# Patient Record
Sex: MALE | Race: WHITE | NOT HISPANIC OR LATINO | Employment: OTHER | URBAN - METROPOLITAN AREA
[De-identification: names, ages, dates, MRNs, and addresses within clinical notes are randomized per-mention and may not be internally consistent; named-entity substitution may affect disease eponyms.]

---

## 2017-01-20 ENCOUNTER — OFFICE VISIT (OUTPATIENT)
Dept: MEDICAL GROUP | Age: 72
End: 2017-01-20
Payer: MEDICARE

## 2017-01-20 VITALS
WEIGHT: 264 LBS | DIASTOLIC BLOOD PRESSURE: 76 MMHG | OXYGEN SATURATION: 92 % | TEMPERATURE: 98.4 F | SYSTOLIC BLOOD PRESSURE: 132 MMHG | HEIGHT: 69 IN | HEART RATE: 75 BPM | BODY MASS INDEX: 39.1 KG/M2

## 2017-01-20 DIAGNOSIS — E66.9 OBESITY (BMI 35.0-39.9 WITHOUT COMORBIDITY): ICD-10-CM

## 2017-01-20 DIAGNOSIS — Z23 NEED FOR PNEUMOCOCCAL VACCINATION: ICD-10-CM

## 2017-01-20 DIAGNOSIS — E78.5 HYPERLIPIDEMIA LDL GOAL <100: ICD-10-CM

## 2017-01-20 DIAGNOSIS — R21 RASH: ICD-10-CM

## 2017-01-20 DIAGNOSIS — E03.4 HYPOTHYROIDISM DUE TO ACQUIRED ATROPHY OF THYROID: ICD-10-CM

## 2017-01-20 PROCEDURE — 99204 OFFICE O/P NEW MOD 45 MIN: CPT | Mod: 25 | Performed by: INTERNAL MEDICINE

## 2017-01-20 PROCEDURE — 90670 PCV13 VACCINE IM: CPT | Performed by: INTERNAL MEDICINE

## 2017-01-20 PROCEDURE — G0009 ADMIN PNEUMOCOCCAL VACCINE: HCPCS | Performed by: INTERNAL MEDICINE

## 2017-01-20 RX ORDER — TRIAMCINOLONE ACETONIDE 1 MG/G
CREAM TOPICAL 2 TIMES DAILY
COMMUNITY
End: 2023-06-17

## 2017-01-20 RX ORDER — ATORVASTATIN CALCIUM 20 MG/1
20 TABLET, FILM COATED ORAL
Qty: 90 TAB | Refills: 3 | Status: SHIPPED | OUTPATIENT
Start: 2017-01-20 | End: 2018-04-26 | Stop reason: SDUPTHER

## 2017-01-20 RX ORDER — ATORVASTATIN CALCIUM 20 MG/1
20 TABLET, FILM COATED ORAL
Qty: 90 TAB | Refills: 3 | Status: SHIPPED | OUTPATIENT
Start: 2017-01-20 | End: 2017-01-20 | Stop reason: SDUPTHER

## 2017-01-20 RX ORDER — LEVOTHYROXINE SODIUM 0.1 MG/1
100 TABLET ORAL
Qty: 90 TAB | Refills: 3 | Status: SHIPPED | OUTPATIENT
Start: 2017-01-20 | End: 2017-08-15 | Stop reason: SDUPTHER

## 2017-01-20 RX ORDER — LEVOTHYROXINE SODIUM 0.1 MG/1
100 TABLET ORAL
Qty: 90 TAB | Refills: 3 | Status: SHIPPED | OUTPATIENT
Start: 2017-01-20 | End: 2017-01-20 | Stop reason: SDUPTHER

## 2017-01-20 ASSESSMENT — PATIENT HEALTH QUESTIONNAIRE - PHQ9: CLINICAL INTERPRETATION OF PHQ2 SCORE: 0

## 2017-01-20 NOTE — ASSESSMENT & PLAN NOTE
Patient reported that he restarted Lipitor in December 2016, as his cholesterol level increased again. He is taking Lipitor 20 mg daily and red yeast rice daily. He denies side effects from taking Lipitor and red yeast rice. He reported that he eats fruits and vegetables and tries to eat healthy.

## 2017-01-20 NOTE — ASSESSMENT & PLAN NOTE
Patient reported that he has difficulty to lose weight. He states that his appetite is very good. He tries to eat more healthy diet. He walks every day for exercise. He also swims in little pool 2-3 times a week. He has not tried to limit his calorie intake and has strict cardio exercise.

## 2017-01-20 NOTE — ASSESSMENT & PLAN NOTE
Patient reported a rash around his neck started 2 days ago after he shaved. He stated that he did not use any shaving foam. He denied pain or discharge from rash. He has not tried anything for his rash. He does not have similar rash on other parts of the body.

## 2017-01-20 NOTE — MR AVS SNAPSHOT
"Price Moon   2017 9:40 AM   Office Visit   MRN: [id number]    Department:  [address] [place] [address]   Dept Phone:  [phone]    Description:  Male : 1945   Provider:  Joann Wakefield M.D.           Reason for Visit     Establish Care N2Y hep c test/rash on neck x2days/weight gain      Allergies as of 2017     No Known Allergies      You were diagnosed with     Hyperlipidemia LDL goal <100   [154918]       Hypothyroidism due to acquired atrophy of thyroid   [4961473]       Obesity (BMI 35.0-39.9 without comorbidity) ([facility])   [855166]       Elevated cholesterol   [266744]       Rash   [592327]       Need for pneumococcal vaccination   [426873]         Vital Signs     Blood Pressure Pulse Temperature Height Weight Body Mass Index    132/76 mmHg 75 36.9 °C (98.4 °F) 1.753 m (5' 9\") 119.75 kg (264 lb) 38.97 kg/m2    Oxygen Saturation Smoking Status                92% Former Smoker          Basic Information     Date Of Birth Sex Race Ethnicity Preferred Language    1945 Male White Non- English      Your appointments     2017  9:00 AM   Established Patient with Joann Wakefield M.D.   [address] [address] [phone]           You will be receiving a confirmation call a few days before your appointment from our automated call confirmation system.              Problem List              ICD-10-CM Priority Class Noted - Resolved    Eye problem H57.9   2016 - Present    BPH (benign prostatic hyperplasia) N40.0   2016 - Present    Hyperlipidemia LDL goal <100 E78.5   2016 - Present    Preventative health care Z00.00   2016 - Present    Hypothyroidism due to acquired atrophy of thyroid E03.4   2016 - Present    Bradycardia with 51-60 beats per minute R00.1   10/7/2016 - Present    Obesity (BMI 35.0-39.9 without comorbidity) ([facility]) E66.9   2017 - Present    Rash R21   2017 - Present   "   Health Maintenance        Date Due Completion Dates    IMM PNEUMOCOCCAL 65+ (ADULT) LOW/MEDIUM RISK SERIES (2 of 2 - PCV13) 5/24/2013 5/24/2012    IMM DTaP/Tdap/Td Vaccine (1 - Tdap) 2/28/2023 (Originally 2/28/2013) 2/27/2013    COLONOSCOPY 7/30/2024 7/30/2014 (Prv Comp)    Override on 7/30/2014: Previously completed            Current Immunizations     13-VALENT PCV PREVNAR  Incomplete    Influenza Vaccine Adult HD 10/7/2016  9:04 AM    Influenza Vaccine Quad Inj (Pf) 11/10/2014    Pneumococcal polysaccharide vaccine (PPSV-23) 5/24/2012    SHINGLES VACCINE 8/8/2015    TD Vaccine 2/27/2013      Below and/or attached are the medications your provider expects you to take. Review all of your home medications and newly ordered medications with your provider and/or pharmacist. Follow medication instructions as directed by your provider and/or pharmacist. Please keep your medication list with you and share with your provider. Update the information when medications are discontinued, doses are changed, or new medications (including over-the-counter products) are added; and carry medication information at all times in the event of emergency situations     Allergies:  No Known Allergies          Medications  Valid as of: January 20, 2017 - 10:27 AM    Generic Name Brand Name Tablet Size Instructions for use    Atorvastatin Calcium (Tab) LIPITOR 20 MG Take 1 Tab by mouth every bedtime.        Bimatoprost (Solution) LUMIGAN 0.01 % Place 1 Drop in both eyes every bedtime.        Levothyroxine Sodium (Tab) SYNTHROID 100 MCG Take 1 Tab by mouth Every morning on an empty stomach.        Misc Natural Products   Take  by mouth.        Red Yeast Rice Extract   Take  by mouth.        Triamcinolone Acetonide (Cream) KENALOG 0.1 % Apply  to affected area(s) 2 times a day.        .                 Medicines prescribed today were sent to:     [facility]ARMACY #[id number] - [place], [place] - [address]    [address] [place] [address]     Phone: [phone] Fax: [phone]    Open 24 Hours?: No    [facility] [place] [place] - [address] AT PORTAL TO REGISTERED [facility]    [address] [facility] [address]    Phone: [phone] Fax: [phone]    Open 24 Hours?: No      Medication refill instructions:       If your prescription bottle indicates you have medication refills left, it is not necessary to call your provider’s office. Please contact your pharmacy and they will refill your medication.    If your prescription bottle indicates you do not have any refills left, you may request refills at any time through one of the following ways: The online [employer] system (except Urgent Care), by calling your provider’s office, or by asking your pharmacy to contact your provider’s office with a refill request. Medication refills are processed only during regular business hours and may not be available until the next business day. Your provider may request additional information or to have a follow-up visit with you prior to refilling your medication.   *Please Note: Medication refills are assigned a new Rx number when refilled electronically. Your pharmacy may indicate that no refills were authorized even though a new prescription for the same medication is available at the pharmacy. Please request the medicine by name with the pharmacy before contacting your provider for a refill.        Your To Do List     Future Labs/Procedures Complete By Expires    CBC WITH DIFFERENTIAL  As directed 1/21/2018    COMP METABOLIC PANEL  As directed 1/21/2018    FREE THYROXINE  As directed 1/21/2018    LIPID PROFILE  As directed 1/21/2018    TSH  As directed 1/21/2018         [employer] Access Code: Activation code not generated  Current [employer] Status: Active

## 2017-01-20 NOTE — ASSESSMENT & PLAN NOTE
He was treated with levothyroxine 150 µg for 2-3 months last year. He stopped taking medication by himself, but he did not feel any difference. His TSH level increased after stopping medication. Patient reported difficult to lose weight. Otherwise, he denied other symptoms of hypothyroidism. He agreed to restart levothyroxine with lower dose 100 mcg every morning.

## 2017-01-20 NOTE — PROGRESS NOTES
Patient called to resent lipitor and levothyroxine to [facility]. Prescriptions are resent.    Joann Wakefield M.D.

## 2017-01-20 NOTE — PROGRESS NOTES
Price Moon is a 71 y.o. male here to establish care and the evaluation and management of:      HPI:    Hyperlipidemia LDL goal <100  Patient reported that he restarted Lipitor in December 2016, as his cholesterol level increased again. He is taking Lipitor 20 mg daily and red yeast rice daily. He denies side effects from taking Lipitor and red yeast rice. He reported that he eats fruits and vegetables and tries to eat healthy.    Hypothyroidism due to acquired atrophy of thyroid  He was treated with levothyroxine 150 µg for 2-3 months last year. He stopped taking medication by himself, but he did not feel any difference. His TSH level increased after stopping medication. Patient reported difficult to lose weight. Otherwise, he denied other symptoms of hypothyroidism. He agreed to restart levothyroxine with lower dose 100 mcg every morning.    Obesity (BMI 35.0-39.9 without comorbidity) (HCC)  Patient reported that he has difficulty to lose weight. He states that his appetite is very good. He tries to eat more healthy diet. He walks every day for exercise. He also swims in little pool 2-3 times a week. He has not tried to limit his calorie intake and has strict cardio exercise.    Rash  Patient reported a rash around his neck started 2 days ago after he shaved. He stated that he did not use any shaving foam. He denied pain or discharge from rash. He has not tried anything for his rash. He does not have similar rash on other parts of the body.      Current medicines (including changes today)  Current Outpatient Prescriptions   Medication Sig Dispense Refill   • Red Yeast Rice Extract (RED YEAST RICE PO) Take  by mouth.     • Misc Natural Products (PROSTATE HEALTH PO) Take  by mouth.     • triamcinolone acetonide (KENALOG) 0.1 % Cream Apply  to affected area(s) 2 times a day.     • atorvastatin (LIPITOR) 20 MG Tab Take 1 Tab by mouth every bedtime. 90 Tab 3   • levothyroxine (SYNTHROID) 100 MCG Tab Take 1 Tab by mouth  "Every morning on an empty stomach. 90 Tab 3   • bimatoprost (LUMIGAN) 0.01 % Solution Place 1 Drop in both eyes every bedtime.       No current facility-administered medications for this visit.     He  has a past medical history of Thyroid disease; Hyperlipidemia; and BPH (benign prostatic hyperplasia).  He  has past surgical history that includes hernia repair and eye surgery.  Social History   Substance Use Topics   • Smoking status: Former Smoker     Types: Cigarettes     Quit date: 1985   • Smokeless tobacco: Never Used   • Alcohol Use: No     Social History     Social History Narrative     Family History   Problem Relation Age of Onset   • Hypertension Mother    • Other Father      ALS   • Hypertension Brother      Family Status   Relation Status Death Age   • Mother     • Father     • Brother Alive    • Son Alive    • Son Alive    • Son Alive    • Son Alive      Health Maintenance Topics with due status: Overdue       Topic Date Due    Annual Wellness Visit 1945    IMM PNEUMOCOCCAL 65+ (ADULT) LOW/MEDIUM RISK SERIES 2013         ROS    Gen.: Denied weight change, appetite change, fatigue.  ENT: Denied sinus tenderness, nasal congestion, runny nose, or sore throat  CVS: Denied chest pain, palpitations, legs swelling.  Respiratory: Denied cough, shortness of breath, wheezing.  GI: Denied abdominal pain, constipation or diarrhea.  Endocrine: Denied temperature intolerance, increased frequency of urination, polyphagia or polydipsia.  Musculoskeletal: Denied back pain or joint pain.    All other systems reviewed and are negative     Objective:     Blood pressure 132/76, pulse 75, temperature 36.9 °C (98.4 °F), height 1.753 m (5' 9\"), weight 119.75 kg (264 lb), SpO2 92 %. Body mass index is 38.97 kg/(m^2).  Physical Exam:    Constitutional: Well nourished and Well developed, Alert, no distress.  Skin: Warm, dry, good turgor, no rashes in visible areas.  Eye: Equal, round and " reactive, conjunctiva clear, lids normal.  ENMT: Lips without lesions, good dentition, oropharynx clear.  Neck: Trachea midline, no masses, no thyromegaly. No cervical or supraclavicular lymphadenopathy.  Respiratory: Unlabored respiratory effort, lungs clear to auscultation, no wheezes, no ronchi.  Cardiovascular: Normal S1, S2, no murmur, no edema.  Abdomen: Soft, non distended, non-tender, no masses, no hepatosplenomegaly. Bowel sound normal.  Extremities: No edema, no clubbing, no cyanosis.  Psych: Alert and oriented x3, normal affect and mood.      Assessment and Plan:   The following treatment plan was discussed       1. Hyperlipidemia LDL goal <100  - Restarted atorvastatin 20 mg daily last months. Reviewed the side effects of atorvastatin and red yeast rice and advised patient to monitor side effects from taking both including muscle ache or cramping.  - Advised to eat low fat, low carbohydrate and high fiber diet as well as do cardio physical exercise regularly.   - We will recheck lipid panel in 3 months.  - atorvastatin (LIPITOR) 20 MG Tab; Take 1 Tab by mouth every bedtime.  Dispense: 90 Tab; Refill: 3  - COMP METABOLIC PANEL; Future  - LIPID PROFILE; Future    2. Hypothyroidism due to acquired atrophy of thyroid  - Restarted levothyroxine 100 µg every morning. Reviewed the side effect of levothyroxine with patient.  - levothyroxine (SYNTHROID) 100 MCG Tab; Take 1 Tab by mouth Every morning on an empty stomach.  Dispense: 90 Tab; Refill: 3  - CBC WITH DIFFERENTIAL; Future  - TSH; Future  - FREE THYROXINE; Future    3. Rash  - Localized inflammation of the hair follicles.  - He has triamcinolone cream at home. Recommend to try triamcinolone cream and Neosporin cream for 2 weeks.    4. Obesity (BMI 35.0-39.9 without comorbidity) ([facility])  - Counseled at length for healthy diet and regular physical exercise to lose weight. Discussed to restrict calorie intake 1800-calorie per day and cardio exercise  regularly.  - Patient identified as having weight management issue.  Appropriate orders and counseling given.  - CBC WITH DIFFERENTIAL; Future  - COMP METABOLIC PANEL; Future    5. Need for pneumococcal vaccination  - Prevnar 13 vaccine was given today after reviewing risks and benefits as well as side effects of vaccine.  - PNEUMOCOCCAL CONJUGATE VACCINE 13-VALENT        Records requested.  Followup: Return in about 3 months (around 4/20/2017), or if symptoms worsen or fail to improve, for hypothyroid, hyperlipidemia, obesity, lab review. sooner should new symptoms or problems arise.      Please note that this dictation was created using voice recognition software. I have made every reasonable attempt to correct obvious errors, but I expect that there may have unintended errors in text, spelling, punctuation, or grammar that I did not discover.

## 2017-03-24 PROBLEM — Z85.820 PERSONAL HISTORY OF MALIGNANT MELANOMA OF SKIN: Status: ACTIVE | Noted: 2017-03-24

## 2017-04-08 ENCOUNTER — HOSPITAL ENCOUNTER (OUTPATIENT)
Dept: LAB | Facility: MEDICAL CENTER | Age: 72
End: 2017-04-08
Attending: INTERNAL MEDICINE
Payer: MEDICARE

## 2017-04-08 DIAGNOSIS — E78.5 HYPERLIPIDEMIA LDL GOAL <100: ICD-10-CM

## 2017-04-08 DIAGNOSIS — E66.9 OBESITY (BMI 35.0-39.9 WITHOUT COMORBIDITY): ICD-10-CM

## 2017-04-08 DIAGNOSIS — E03.4 HYPOTHYROIDISM DUE TO ACQUIRED ATROPHY OF THYROID: ICD-10-CM

## 2017-04-08 LAB
ALBUMIN SERPL BCP-MCNC: 4.1 G/DL (ref 3.2–4.9)
ALBUMIN/GLOB SERPL: 1.4 G/DL
ALP SERPL-CCNC: 82 U/L (ref 30–99)
ALT SERPL-CCNC: 31 U/L (ref 2–50)
ANION GAP SERPL CALC-SCNC: 5 MMOL/L (ref 0–11.9)
AST SERPL-CCNC: 25 U/L (ref 12–45)
BASOPHILS # BLD AUTO: 0.7 % (ref 0–1.8)
BASOPHILS # BLD: 0.03 K/UL (ref 0–0.12)
BILIRUB SERPL-MCNC: 0.9 MG/DL (ref 0.1–1.5)
BUN SERPL-MCNC: 21 MG/DL (ref 8–22)
CALCIUM SERPL-MCNC: 9.2 MG/DL (ref 8.5–10.5)
CHLORIDE SERPL-SCNC: 106 MMOL/L (ref 96–112)
CHOLEST SERPL-MCNC: 115 MG/DL (ref 100–199)
CO2 SERPL-SCNC: 29 MMOL/L (ref 20–33)
CREAT SERPL-MCNC: 1.04 MG/DL (ref 0.5–1.4)
EOSINOPHIL # BLD AUTO: 0.06 K/UL (ref 0–0.51)
EOSINOPHIL NFR BLD: 1.3 % (ref 0–6.9)
ERYTHROCYTE [DISTWIDTH] IN BLOOD BY AUTOMATED COUNT: 43.8 FL (ref 35.9–50)
GFR SERPL CREATININE-BSD FRML MDRD: >60 ML/MIN/1.73 M 2
GLOBULIN SER CALC-MCNC: 2.9 G/DL (ref 1.9–3.5)
GLUCOSE SERPL-MCNC: 100 MG/DL (ref 65–99)
HCT VFR BLD AUTO: 48.8 % (ref 42–52)
HDLC SERPL-MCNC: 40 MG/DL
HGB BLD-MCNC: 16 G/DL (ref 14–18)
IMM GRANULOCYTES # BLD AUTO: 0.01 K/UL (ref 0–0.11)
IMM GRANULOCYTES NFR BLD AUTO: 0.2 % (ref 0–0.9)
LDLC SERPL CALC-MCNC: 57 MG/DL
LYMPHOCYTES # BLD AUTO: 1.62 K/UL (ref 1–4.8)
LYMPHOCYTES NFR BLD: 35.3 % (ref 22–41)
MCH RBC QN AUTO: 29.7 PG (ref 27–33)
MCHC RBC AUTO-ENTMCNC: 32.8 G/DL (ref 33.7–35.3)
MCV RBC AUTO: 90.5 FL (ref 81.4–97.8)
MONOCYTES # BLD AUTO: 0.4 K/UL (ref 0–0.85)
MONOCYTES NFR BLD AUTO: 8.7 % (ref 0–13.4)
NEUTROPHILS # BLD AUTO: 2.47 K/UL (ref 1.82–7.42)
NEUTROPHILS NFR BLD: 53.8 % (ref 44–72)
NRBC # BLD AUTO: 0 K/UL
NRBC BLD AUTO-RTO: 0 /100 WBC
PLATELET # BLD AUTO: 176 K/UL (ref 164–446)
PMV BLD AUTO: 10.1 FL (ref 9–12.9)
POTASSIUM SERPL-SCNC: 4.6 MMOL/L (ref 3.6–5.5)
PROT SERPL-MCNC: 7 G/DL (ref 6–8.2)
RBC # BLD AUTO: 5.39 M/UL (ref 4.7–6.1)
SODIUM SERPL-SCNC: 140 MMOL/L (ref 135–145)
T4 FREE SERPL-MCNC: 0.82 NG/DL (ref 0.53–1.43)
TRIGL SERPL-MCNC: 92 MG/DL (ref 0–149)
TSH SERPL DL<=0.005 MIU/L-ACNC: 1.97 UIU/ML (ref 0.3–3.7)
WBC # BLD AUTO: 4.6 K/UL (ref 4.8–10.8)

## 2017-04-08 PROCEDURE — 85025 COMPLETE CBC W/AUTO DIFF WBC: CPT

## 2017-04-08 PROCEDURE — 84443 ASSAY THYROID STIM HORMONE: CPT

## 2017-04-08 PROCEDURE — 84439 ASSAY OF FREE THYROXINE: CPT

## 2017-04-08 PROCEDURE — 80053 COMPREHEN METABOLIC PANEL: CPT

## 2017-04-08 PROCEDURE — 36415 COLL VENOUS BLD VENIPUNCTURE: CPT

## 2017-04-08 PROCEDURE — 80061 LIPID PANEL: CPT

## 2017-04-09 NOTE — PROGRESS NOTES
Quick Note:    I will discuss the result in upcoming appointment.     Joann Wakefield MD    ______

## 2017-04-20 ENCOUNTER — OFFICE VISIT (OUTPATIENT)
Dept: MEDICAL GROUP | Age: 72
End: 2017-04-20
Payer: MEDICARE

## 2017-04-20 VITALS
OXYGEN SATURATION: 94 % | RESPIRATION RATE: 16 BRPM | SYSTOLIC BLOOD PRESSURE: 124 MMHG | BODY MASS INDEX: 37.22 KG/M2 | HEART RATE: 78 BPM | TEMPERATURE: 98.1 F | DIASTOLIC BLOOD PRESSURE: 78 MMHG | WEIGHT: 260 LBS | HEIGHT: 70 IN

## 2017-04-20 DIAGNOSIS — N40.1 BENIGN PROSTATIC HYPERPLASIA WITH LOWER URINARY TRACT SYMPTOMS, UNSPECIFIED MORPHOLOGY: ICD-10-CM

## 2017-04-20 DIAGNOSIS — E78.5 HYPERLIPIDEMIA LDL GOAL <100: ICD-10-CM

## 2017-04-20 DIAGNOSIS — E03.4 HYPOTHYROIDISM DUE TO ACQUIRED ATROPHY OF THYROID: ICD-10-CM

## 2017-04-20 DIAGNOSIS — M54.2 NECK PAIN, BILATERAL: ICD-10-CM

## 2017-04-20 DIAGNOSIS — E66.9 OBESITY (BMI 35.0-39.9 WITHOUT COMORBIDITY): ICD-10-CM

## 2017-04-20 PROCEDURE — 99214 OFFICE O/P EST MOD 30 MIN: CPT | Performed by: INTERNAL MEDICINE

## 2017-04-20 PROCEDURE — 3017F COLORECTAL CA SCREEN DOC REV: CPT | Performed by: INTERNAL MEDICINE

## 2017-04-20 PROCEDURE — 4040F PNEUMOC VAC/ADMIN/RCVD: CPT | Performed by: INTERNAL MEDICINE

## 2017-04-20 PROCEDURE — 1101F PT FALLS ASSESS-DOCD LE1/YR: CPT | Performed by: INTERNAL MEDICINE

## 2017-04-20 PROCEDURE — G8419 CALC BMI OUT NRM PARAM NOF/U: HCPCS | Performed by: INTERNAL MEDICINE

## 2017-04-20 PROCEDURE — G8432 DEP SCR NOT DOC, RNG: HCPCS | Performed by: INTERNAL MEDICINE

## 2017-04-20 PROCEDURE — 1036F TOBACCO NON-USER: CPT | Performed by: INTERNAL MEDICINE

## 2017-04-20 ASSESSMENT — PAIN SCALES - GENERAL: PAINLEVEL: 5=MODERATE PAIN

## 2017-04-20 NOTE — ASSESSMENT & PLAN NOTE
Patient reports that he has intermittent sharp pain on bilateral back of the neck, radiated to the both shoulders for a few months. Pain is triggered by movement of the neck or shoulders. Pain improved with resting. We have discussed treatment option and have CT C-spine to evaluate for spinal stenosis. Patient declined to do the imaging study currently. He is interested to try conservative treatment first.

## 2017-04-20 NOTE — ASSESSMENT & PLAN NOTE
Patient takes over-the-counter prostate health supplement. He tolerates over-the-counter supplement without side effect. He only needed to get up one time at night for urination . He still has weak urine flow but stated that his symptoms are able to manage with over-the-counter and he does not want to take prescription medication.

## 2017-04-20 NOTE — MR AVS SNAPSHOT
"Price Moon   2017 9:00 AM   Office Visit   MRN: [id number]    Department:  [address] [place] [address]   Dept Phone:  [phone]    Description:  Male : 1945   Provider:  Joann Wakefield M.D.           Reason for Visit     Hyperlipidemia f/v lab review      Allergies as of 2017     No Known Allergies      You were diagnosed with     Hyperlipidemia LDL goal <100   [950454]       Hypothyroidism due to acquired atrophy of thyroid   [5867356]       Benign prostatic hyperplasia with lower urinary tract symptoms, unspecified morphology   [2428964]       Obesity (BMI 35.0-39.9 without comorbidity) (HCC)   [344438]       Neck pain, bilateral   [198192]         Vital Signs     Blood Pressure Pulse Temperature Respirations Height Weight    124/78 mmHg 78 36.7 °C (98.1 °F) 16 1.778 m (5' 10\") 117.935 kg (260 lb)    Body Mass Index Oxygen Saturation Smoking Status             37.31 kg/m2 94% Former Smoker         Basic Information     Date Of Birth Sex Race Ethnicity Preferred Language    1945 Male White Non- English      Your appointments     Oct 26, 2017  8:00 AM   ANNUAL EXAM PREVENTATIVE with Joann Wakefield M.D.   [address] [address] [phone]              Problem List              ICD-10-CM Priority Class Noted - Resolved    Eye problem H57.9   2016 - Present    BPH (benign prostatic hyperplasia) N40.0   2016 - Present    Hyperlipidemia LDL goal <100 E78.5   2016 - Present    Preventative health care Z00.00   2016 - Present    Hypothyroidism due to acquired atrophy of thyroid E03.4   2016 - Present    Bradycardia with 51-60 beats per minute R00.1   10/7/2016 - Present    Obesity (BMI 35.0-39.9 without comorbidity) (HCC) E66.9   2017 - Present    Rash R21   2017 - Present    Neck pain, bilateral M54.2   2017 - Present      Health Maintenance        Date Due Completion Dates    IMM " DTaP/Tdap/Td Vaccine (1 - Tdap) 2/28/2023 (Originally 2/28/2013) 2/27/2013    COLONOSCOPY 7/30/2024 7/30/2014 (Prv Comp)    Override on 7/30/2014: Previously completed            Current Immunizations     13-VALENT PCV PREVNAR 1/20/2017    Influenza Vaccine Adult HD 10/7/2016  9:04 AM    Influenza Vaccine Quad Inj (Pf) 11/10/2014    Pneumococcal polysaccharide vaccine (PPSV-23) 5/24/2012    SHINGLES VACCINE 8/8/2015    TD Vaccine 2/27/2013      Below and/or attached are the medications your provider expects you to take. Review all of your home medications and newly ordered medications with your provider and/or pharmacist. Follow medication instructions as directed by your provider and/or pharmacist. Please keep your medication list with you and share with your provider. Update the information when medications are discontinued, doses are changed, or new medications (including over-the-counter products) are added; and carry medication information at all times in the event of emergency situations     Allergies:  No Known Allergies          Medications  Valid as of: April 20, 2017 -  9:41 AM    Generic Name Brand Name Tablet Size Instructions for use    Atorvastatin Calcium (Tab) LIPITOR 20 MG Take 1 Tab by mouth every bedtime.        Bimatoprost (Solution) LUMIGAN 0.01 % Place 1 Drop in both eyes every bedtime.        Levothyroxine Sodium (Tab) SYNTHROID 100 MCG Take 1 Tab by mouth Every morning on an empty stomach.        Misc Natural Products   Take  by mouth.        Red Yeast Rice Extract   Take  by mouth.        Triamcinolone Acetonide (Cream) KENALOG 0.1 % Apply  to affected area(s) 2 times a day.        .                 Medicines prescribed today were sent to:     [facility]/PHARMACY #[id number] - [place] - [address]    [address] [place] [address]    Phone: [phone] Fax: [phone]    Open 24 Hours?: No    [facility] - [place], [place] - [address] AT PORTAL TO REGISTERED  [facility]    [address] [facility] [address]    Phone: [phone] Fax: [phone]    Open 24 Hours?: No      Medication refill instructions:       If your prescription bottle indicates you have medication refills left, it is not necessary to call your provider’s office. Please contact your pharmacy and they will refill your medication.    If your prescription bottle indicates you do not have any refills left, you may request refills at any time through one of the following ways: The online [employer] system (except Urgent Care), by calling your provider’s office, or by asking your pharmacy to contact your provider’s office with a refill request. Medication refills are processed only during regular business hours and may not be available until the next business day. Your provider may request additional information or to have a follow-up visit with you prior to refilling your medication.   *Please Note: Medication refills are assigned a new Rx number when refilled electronically. Your pharmacy may indicate that no refills were authorized even though a new prescription for the same medication is available at the pharmacy. Please request the medicine by name with the pharmacy before contacting your provider for a refill.        Your To Do List     Future Labs/Procedures Complete By Expires    COMP METABOLIC PANEL  As directed 4/21/2018    FREE THYROXINE  As directed 4/21/2018    LIPID PROFILE  As directed 4/21/2018    TSH  As directed 4/21/2018         [employer] Access Code: Activation code not generated  Current [employer] Status: Active

## 2017-04-20 NOTE — ASSESSMENT & PLAN NOTE
He is taking levothyroxine 100 µg every morning and stated that he takes it with empty stomach. Levothyroxine was restarted on 1/20/17. His thyroid function improved and stable. He denied signs and symptoms of hypo-or hyperthyroidism.    Results for DEWAYNE VILLEGAS (MRN [id number]) as of 4/20/2017 10:13   Ref. Range 4/8/2017 08:33   TSH Latest Ref Range: 0.300-3.700 uIU/mL 1.970   Free T-4 Latest Ref Range: 0.53-1.43 ng/dL 0.82

## 2017-04-20 NOTE — PROGRESS NOTES
Subjective:   Dewayne Moon is a 71 y.o. male here today for evaluation and management of:      Hyperlipidemia LDL goal <100  Patient is taking Lipitor 10 mg every evening, started in December 2016. He still taking red yeast rice from over-the-counter. He tolerates both medications. He denies side effects from taking his Lipitor and red yeast rice. His cholesterol level improved. Liver enzymes are within normal. He has slightly elevated fasting blood glucose at 100.    Results for DEWAYNE MOON (MRN [id number]) as of 4/20/2017 10:13   Ref. Range 4/8/2017 08:33   Cholesterol,Tot Latest Ref Range: 100-199 mg/dL 115   Triglycerides Latest Ref Range: 0-149 mg/dL 92   HDL Latest Ref Range: >=40 mg/dL 40   LDL Latest Ref Range: <100 mg/dL 57       Hypothyroidism due to acquired atrophy of thyroid  He is taking levothyroxine 100 µg every morning and stated that he takes it with empty stomach. Levothyroxine was restarted on 1/20/17. His thyroid function improved and stable. He denied signs and symptoms of hypo-or hyperthyroidism.    Results for DEWAYNE MOON (MRN [id number]) as of 4/20/2017 10:13   Ref. Range 4/8/2017 08:33   TSH Latest Ref Range: 0.300-3.700 uIU/mL 1.970   Free T-4 Latest Ref Range: 0.53-1.43 ng/dL 0.82       BPH (benign prostatic hyperplasia)  Patient takes over-the-counter prostate health supplement. He tolerates over-the-counter supplement without side effect. He only needed to get up one time at night for urination . He still has weak urine flow but stated that his symptoms are able to manage with over-the-counter and he does not want to take prescription medication.    Neck pain, bilateral  Patient reports that he has intermittent sharp pain on bilateral back of the neck, radiated to the both shoulders for a few months. Pain is triggered by movement of the neck or shoulders. Pain improved with resting. We have discussed treatment option and have CT C-spine to evaluate for spinal stenosis. Patient declined to do  "the imaging study currently. He is interested to try conservative treatment first.    Obesity (BMI 35.0-39.9 without comorbidity) (HCC)  Patient's weight has not been decreasing a lot. He lost 4 pounds compared to the weight measured in previous visit on 1/20/17. He reported that he has regular biking exercise for 45 minutes 4 times a week. He swims 3-4 times a week as well.         Current medicines (including changes today)  Current Outpatient Prescriptions   Medication Sig Dispense Refill   • Red Yeast Rice Extract (RED YEAST RICE PO) Take  by mouth.     • Misc Natural Products (PROSTATE HEALTH PO) Take  by mouth.     • triamcinolone acetonide (KENALOG) 0.1 % Cream Apply  to affected area(s) 2 times a day.     • atorvastatin (LIPITOR) 20 MG Tab Take 1 Tab by mouth every bedtime. 90 Tab 3   • levothyroxine (SYNTHROID) 100 MCG Tab Take 1 Tab by mouth Every morning on an empty stomach. 90 Tab 3   • bimatoprost (LUMIGAN) 0.01 % Solution Place 1 Drop in both eyes every bedtime.       No current facility-administered medications for this visit.     He  has a past medical history of Thyroid disease; Hyperlipidemia; and BPH (benign prostatic hyperplasia).    ROS   No chest pain, no shortness of breath, no abdominal pain       Objective:     Blood pressure 124/78, pulse 78, temperature 36.7 °C (98.1 °F), resp. rate 16, height 1.778 m (5' 10\"), weight 117.935 kg (260 lb), SpO2 94 %. Body mass index is 37.31 kg/(m^2).   Physical Exam:  General: Alert, oriented and no acute distress.  Eye contact is good, speech goal directed, affect calm  HEENT: conjunctiva non-injected, sclera non-icteric.  Oral mucous membranes pink and moist with no lesions.  Pinna normal.   Lungs: Normal respiratory effort, clear to auscultation bilaterally with good excursion.  CV: regular rate and rhythm. No murmurs.   Abdomen: soft, non distended, nontender, Bowel sound normal.  Ext: no edema, color normal, vascularity normal, temperature normal   "       Assessment and Plan:   The following treatment plan was discussed     1. Hyperlipidemia LDL goal <100  - Well-controlled. Continue current regimens. Recheck lab 1-2 weeks before next follow up visit.  - Advised to eat low fat, low carbohydrate and high fiber diet as well as do cardio physical exercise regularly.   - COMP METABOLIC PANEL; Future  - LIPID PROFILE; Future    2. Hypothyroidism due to acquired atrophy of thyroid  - Well-controlled. Continue current regimens. Recheck lab 1-2 weeks before next follow up visit.  - TSH; Future  - FREE THYROXINE; Future    3. Benign prostatic hyperplasia with lower urinary tract symptoms, unspecified morphology  - Well-controlled. Continue current regimens. Recheck lab 1-2 weeks before next follow up visit.  - Patient declined to take prescription medication.     4. Obesity (BMI 35.0-39.9 without comorbidity) ([facility])  - Counseled for healthy diet and regular physical exercise to lose weight.     5. Neck pain, bilateral  - Discussed and demonstrated how to do gentle stretching neck and shoulders exercises.   - Advised to do heat therapy.  - Discussed to do physical therapy if symptoms do not improve with above management. Patient wants to wait to refer to physical therapy. He also declined to do x-ray or CT neck.  - He may try over-the-counter Tylenol or low-dose ibuprofen as needed for pain. Discussed to use NSAIDs cautiously.  - Advised patient to stop taking NSAIDs if he has abdominal pain, black tarry stool or bloody stool.      Followup: Return in about 6 months (around 10/20/2017), or if symptoms worsen or fail to improve, for annual exam, lab follow-up.      Please note that this dictation was created using voice recognition software. I have made every reasonable attempt to correct obvious errors, but I expect that there may have unintended errors in text, spelling, punctuation, or grammar that I did not discover.

## 2017-04-20 NOTE — ASSESSMENT & PLAN NOTE
Patient's weight has not been decreasing a lot. He lost 4 pounds compared to the weight measured in previous visit on 1/20/17. He reported that he has regular biking exercise for 45 minutes 4 times a week. He swims 3-4 times a week as well.

## 2017-04-20 NOTE — ASSESSMENT & PLAN NOTE
Patient is taking Lipitor 10 mg every evening, started in December 2016. He still taking red yeast rice from over-the-counter. He tolerates both medications. He denies side effects from taking his Lipitor and red yeast rice. His cholesterol level improved. Liver enzymes are within normal. He has slightly elevated fasting blood glucose at 100.    Results for DEWAYNE VILLEGAS (MRN [id number]) as of 4/20/2017 10:13   Ref. Range 4/8/2017 08:33   Cholesterol,Tot Latest Ref Range: 100-199 mg/dL 115   Triglycerides Latest Ref Range: 0-149 mg/dL 92   HDL Latest Ref Range: >=40 mg/dL 40   LDL Latest Ref Range: <100 mg/dL 57

## 2017-05-17 ENCOUNTER — OFFICE VISIT (OUTPATIENT)
Dept: MEDICAL GROUP | Age: 72
End: 2017-05-17
Payer: MEDICARE

## 2017-05-17 VITALS
BODY MASS INDEX: 36.59 KG/M2 | DIASTOLIC BLOOD PRESSURE: 82 MMHG | WEIGHT: 255.6 LBS | SYSTOLIC BLOOD PRESSURE: 110 MMHG | OXYGEN SATURATION: 91 % | HEART RATE: 96 BPM | TEMPERATURE: 98.5 F | HEIGHT: 70 IN

## 2017-05-17 DIAGNOSIS — H69.93 EUSTACHIAN TUBE DYSFUNCTION, BILATERAL: ICD-10-CM

## 2017-05-17 DIAGNOSIS — R05.9 COUGH: ICD-10-CM

## 2017-05-17 DIAGNOSIS — J01.00 ACUTE NON-RECURRENT MAXILLARY SINUSITIS: ICD-10-CM

## 2017-05-17 PROCEDURE — 99214 OFFICE O/P EST MOD 30 MIN: CPT | Mod: 25 | Performed by: FAMILY MEDICINE

## 2017-05-17 PROCEDURE — 94640 AIRWAY INHALATION TREATMENT: CPT | Performed by: FAMILY MEDICINE

## 2017-05-17 PROCEDURE — 1101F PT FALLS ASSESS-DOCD LE1/YR: CPT | Performed by: FAMILY MEDICINE

## 2017-05-17 PROCEDURE — 4040F PNEUMOC VAC/ADMIN/RCVD: CPT | Performed by: FAMILY MEDICINE

## 2017-05-17 PROCEDURE — G8419 CALC BMI OUT NRM PARAM NOF/U: HCPCS | Performed by: FAMILY MEDICINE

## 2017-05-17 PROCEDURE — 1036F TOBACCO NON-USER: CPT | Performed by: FAMILY MEDICINE

## 2017-05-17 PROCEDURE — 3017F COLORECTAL CA SCREEN DOC REV: CPT | Performed by: FAMILY MEDICINE

## 2017-05-17 PROCEDURE — G8432 DEP SCR NOT DOC, RNG: HCPCS | Performed by: FAMILY MEDICINE

## 2017-05-17 RX ORDER — BENZONATATE 100 MG/1
100 CAPSULE ORAL 3 TIMES DAILY PRN
Qty: 60 CAP | Refills: 0 | Status: SHIPPED | OUTPATIENT
Start: 2017-05-17 | End: 2017-06-14

## 2017-05-17 RX ORDER — ALBUTEROL SULFATE 90 UG/1
2 AEROSOL, METERED RESPIRATORY (INHALATION) EVERY 6 HOURS PRN
Qty: 8.5 G | Refills: 0 | Status: SHIPPED | OUTPATIENT
Start: 2017-05-17 | End: 2017-10-26

## 2017-05-17 RX ORDER — FLUTICASONE PROPIONATE 50 MCG
2 SPRAY, SUSPENSION (ML) NASAL DAILY
Qty: 16 G | Refills: 3 | Status: SHIPPED | OUTPATIENT
Start: 2017-05-17 | End: 2018-10-22

## 2017-05-17 RX ORDER — IPRATROPIUM BROMIDE AND ALBUTEROL SULFATE 2.5; .5 MG/3ML; MG/3ML
3 SOLUTION RESPIRATORY (INHALATION) ONCE
Status: COMPLETED | OUTPATIENT
Start: 2017-05-17 | End: 2017-05-17

## 2017-05-17 RX ORDER — AMOXICILLIN AND CLAVULANATE POTASSIUM 875; 125 MG/1; MG/1
1 TABLET, FILM COATED ORAL 2 TIMES DAILY
Qty: 20 TAB | Refills: 0 | Status: SHIPPED | OUTPATIENT
Start: 2017-05-17 | End: 2017-06-14

## 2017-05-17 RX ORDER — CODEINE PHOSPHATE AND GUAIFENESIN 10; 100 MG/5ML; MG/5ML
5 SOLUTION ORAL EVERY 6 HOURS PRN
Qty: 150 ML | Refills: 0 | Status: SHIPPED | OUTPATIENT
Start: 2017-05-17 | End: 2017-10-26

## 2017-05-17 RX ADMIN — IPRATROPIUM BROMIDE AND ALBUTEROL SULFATE 3 ML: 2.5; .5 SOLUTION RESPIRATORY (INHALATION) at 16:26

## 2017-05-17 NOTE — MR AVS SNAPSHOT
"        Pricebenigno Mesabenjamindee   2017 2:00 PM   Office Visit   MRN: [id number]    Department:  [address] [place] [address]   Dept Phone:  [phone]    Description:  Male : 1945   Provider:  Jaquelin Mesa M.D.           Reason for Visit     Nasal Congestion Having a lot more mucus then usual. x 1 week    Otalgia Pressure, can not hear anything      Allergies as of 2017     No Known Allergies      You were diagnosed with     Acute non-recurrent maxillary sinusitis   [7256519]       Eustachian tube dysfunction, bilateral   [8260414]       Cough   [786.2.ICD-9-CM]         Vital Signs     Blood Pressure Pulse Temperature Height Weight Body Mass Index    110/82 mmHg 96 36.9 °C (98.5 °F) 1.778 m (5' 10\") 115.939 kg (255 lb 9.6 oz) 36.67 kg/m2    Oxygen Saturation Smoking Status                91% Former Smoker          Basic Information     Date Of Birth Sex Race Ethnicity Preferred Language    1945 Male White Non- English      Your appointments     May 24, 2017  3:40 PM   Established Patient with Jaquelin Mesa M.D.   [address])    [address] [place] [address] [phone]           You will be receiving a confirmation call a few days before your appointment from our automated call confirmation system.            Oct 26, 2017  8:00 AM   ANNUAL EXAM PREVENTATIVE with Joann Wakefield M.D.   [address] [address] [phone]              Problem List              ICD-10-CM Priority Class Noted - Resolved    Eye problem H57.9   2016 - Present    BPH (benign prostatic hyperplasia) N40.0   2016 - Present    Hyperlipidemia LDL goal <100 E78.5   2016 - Present    Preventative health care Z00.00   2016 - Present    Hypothyroidism due to acquired atrophy of thyroid E03.4   2016 - Present    Bradycardia with 51-60 beats per minute R00.1   10/7/2016 - Present    Obesity (BMI 35.0-39.9 " without comorbidity) ([facility]) E66.9   1/20/2017 - Present    Rash R21   1/20/2017 - Present    Neck pain, bilateral M54.2   4/20/2017 - Present      Health Maintenance        Date Due Completion Dates    IMM DTaP/Tdap/Td Vaccine (1 - Tdap) 2/28/2023 (Originally 2/28/2013) 2/27/2013    COLONOSCOPY 7/30/2024 7/30/2014 (Prv Comp)    Override on 7/30/2014: Previously completed            Current Immunizations     13-VALENT PCV PREVNAR 1/20/2017    Influenza Vaccine Adult HD 10/7/2016  9:04 AM    Influenza Vaccine Quad Inj (Pf) 11/10/2014    Pneumococcal polysaccharide vaccine (PPSV-23) 5/24/2012    SHINGLES VACCINE 8/8/2015    TD Vaccine 2/27/2013      Below and/or attached are the medications your provider expects you to take. Review all of your home medications and newly ordered medications with your provider and/or pharmacist. Follow medication instructions as directed by your provider and/or pharmacist. Please keep your medication list with you and share with your provider. Update the information when medications are discontinued, doses are changed, or new medications (including over-the-counter products) are added; and carry medication information at all times in the event of emergency situations     Allergies:  No Known Allergies          Medications  Valid as of: May 17, 2017 -  3:19 PM    Generic Name Brand Name Tablet Size Instructions for use    Albuterol Sulfate (Aero Soln) albuterol 108 (90 BASE) MCG/ACT Inhale 2 Puffs by mouth every 6 hours as needed for Shortness of Breath.        Amoxicillin-Pot Clavulanate (Tab) AUGMENTIN 875-125 MG Take 1 Tab by mouth 2 times a day.        Atorvastatin Calcium (Tab) LIPITOR 20 MG Take 1 Tab by mouth every bedtime.        Benzonatate (Cap) TESSALON 100 MG Take 1 Cap by mouth 3 times a day as needed for Cough.        Bimatoprost (Solution) LUMIGAN 0.01 % Place 1 Drop in both eyes every bedtime.        Fluticasone Propionate (Suspension) FLONASE 50 MCG/ACT Spray 2 Sprays in  nose every day. Each Nostril        Guaifenesin-Codeine (Solution) ROBITUSSIN -10 mg/5mL Take 5 mL by mouth every 6 hours as needed for Cough.        Levothyroxine Sodium (Tab) SYNTHROID 100 MCG Take 1 Tab by mouth Every morning on an empty stomach.        Misc Natural Products   Take  by mouth.        Red Yeast Rice Extract   Take  by mouth.        Triamcinolone Acetonide (Cream) KENALOG 0.1 % Apply  to affected area(s) 2 times a day.        .                 Medicines prescribed today were sent to:     [facility]ARMACY #[id number] - [place], [place] - [address]    [address] [place] [address]    Phone: [phone] Fax: [phone]    Open 24 Hours?: No    [facility] - [place], [place] - [address] AT PORTAL TO REGISTERED [facility]    [address] [facility] [address]    Phone: [phone] Fax: [phone]    Open 24 Hours?: No      Medication refill instructions:       If your prescription bottle indicates you have medication refills left, it is not necessary to call your provider’s office. Please contact your pharmacy and they will refill your medication.    If your prescription bottle indicates you do not have any refills left, you may request refills at any time through one of the following ways: The online [employer] system (except Urgent Care), by calling your provider’s office, or by asking your pharmacy to contact your provider’s office with a refill request. Medication refills are processed only during regular business hours and may not be available until the next business day. Your provider may request additional information or to have a follow-up visit with you prior to refilling your medication.   *Please Note: Medication refills are assigned a new Rx number when refilled electronically. Your pharmacy may indicate that no refills were authorized even though a new prescription for the same medication is available at the pharmacy. Please request the medicine by  name with the pharmacy before contacting your provider for a refill.           MyChart Access Code: Activation code not generated  Current MyChart Status: Active

## 2017-05-17 NOTE — PROGRESS NOTES
"SUBJECTIVE:        Chief Complaint   Patient presents with   • Nasal Congestion     Having a lot more mucus then usual. x 1 week   • Otalgia     Pressure, can not hear anything       HPI:     Price Moon is a 71 y.o. male here for symptoms of cough, nasal congestion and ear pressure on both sides. Symptoms started about a week ago, but has not been having any improvements in symptoms. Wife has similar symptoms. Recently was in [place] visiting his son.  Has had a lot of mucous form his nose. Coughing up mucous as well. Ear feel congested on both sides. Has been using some OTC prodcuts without improvement. Has used nyquil but does not feel it helps. Denies any wheezing or significant shortness of breath. Used Nyquil.   Nonsmoker currently, hx smoking some.       ROS:  Denies current fevers or chills. No nausea or vomiting. No diarrhea. No chest pains or shortness of breath. No lower extremity edema.    Current Outpatient Prescriptions on File Prior to Visit   Medication Sig Dispense Refill   • Red Yeast Rice Extract (RED YEAST RICE PO) Take  by mouth.     • Misc Natural Products (PROSTATE HEALTH PO) Take  by mouth.     • atorvastatin (LIPITOR) 20 MG Tab Take 1 Tab by mouth every bedtime. 90 Tab 3   • levothyroxine (SYNTHROID) 100 MCG Tab Take 1 Tab by mouth Every morning on an empty stomach. 90 Tab 3   • bimatoprost (LUMIGAN) 0.01 % Solution Place 1 Drop in both eyes every bedtime.     • triamcinolone acetonide (KENALOG) 0.1 % Cream Apply  to affected area(s) 2 times a day.       No current facility-administered medications on file prior to visit.       No Known Allergies    Past Medical History   Diagnosis Date   • Thyroid disease    • Hyperlipidemia    • BPH (benign prostatic hyperplasia)          OBJECTIVE:   /82 mmHg  Pulse 96  Temp(Src) 36.9 °C (98.5 °F)  Ht 1.778 m (5' 10\")  Wt 115.939 kg (255 lb 9.6 oz)  BMI 36.67 kg/m2  SpO2 91%   General: Well-developed well-nourished male, no acute " distress  HEENT: oropharynx clear, eyes clear, TMs clear and intact  Neck: supple, no lymphadenopathy- cervical or supraclavicular, no thyromegaly  Cardiovascular: regular rate and rhythm, no murmurs, gallops, rubs  Lungs: minimal wheezes throughout. No crackles, or rhonchi  Abdomen: +bowel sounds, soft, nontender, nondistended, no rebound, no guarding, no hepatosplenomegaly  Extremities: no cyanosis, clubbing, edema  Skin: Warm and dry  Psych: appropriate mood and affect    ASSESSMENT/PLAN:    71 year old male.     1. Acute non-recurrent maxillary sinusitis  amoxicillin-clavulanate (AUGMENTIN) 875-125 MG Tab   2. Eustachian tube dysfunction, bilateral  fluticasone (FLONASE) 50 MCG/ACT nasal spray   3. Cough- minimal wheezes, improved with nebulizer treatment, oxygenation also improve. Appear slight reactive airway component. Monitor.   guaifenesin-codeine (ROBITUSSIN AC) Solution oral solution    albuterol 108 (90 BASE) MCG/ACT Aero Soln inhalation aerosol    ipratropium-albuterol (DUONEB) nebulizer solution 3 mL       Return in about 1 week (around 5/24/2017).    This medical record contains text that has been entered with the assistance of computer voice recognition and dictation software.  Therefore, it may contain unintended errors in text, spelling, punctuation, or grammar.

## 2017-05-24 ENCOUNTER — TELEPHONE (OUTPATIENT)
Dept: MEDICAL GROUP | Age: 72
End: 2017-05-24

## 2017-05-24 ENCOUNTER — OFFICE VISIT (OUTPATIENT)
Dept: MEDICAL GROUP | Age: 72
End: 2017-05-24
Payer: MEDICARE

## 2017-05-24 VITALS
HEIGHT: 70 IN | DIASTOLIC BLOOD PRESSURE: 70 MMHG | TEMPERATURE: 98 F | WEIGHT: 255.4 LBS | BODY MASS INDEX: 36.56 KG/M2 | OXYGEN SATURATION: 93 % | SYSTOLIC BLOOD PRESSURE: 122 MMHG | HEART RATE: 95 BPM

## 2017-05-24 DIAGNOSIS — H69.93 EUSTACHIAN TUBE DYSFUNCTION, BILATERAL: ICD-10-CM

## 2017-05-24 DIAGNOSIS — J01.00 ACUTE NON-RECURRENT MAXILLARY SINUSITIS: ICD-10-CM

## 2017-05-24 DIAGNOSIS — R05.9 COUGH: ICD-10-CM

## 2017-05-24 PROCEDURE — G8419 CALC BMI OUT NRM PARAM NOF/U: HCPCS | Performed by: FAMILY MEDICINE

## 2017-05-24 PROCEDURE — 3017F COLORECTAL CA SCREEN DOC REV: CPT | Performed by: FAMILY MEDICINE

## 2017-05-24 PROCEDURE — 1036F TOBACCO NON-USER: CPT | Performed by: FAMILY MEDICINE

## 2017-05-24 PROCEDURE — 1101F PT FALLS ASSESS-DOCD LE1/YR: CPT | Performed by: FAMILY MEDICINE

## 2017-05-24 PROCEDURE — 4040F PNEUMOC VAC/ADMIN/RCVD: CPT | Performed by: FAMILY MEDICINE

## 2017-05-24 PROCEDURE — G8432 DEP SCR NOT DOC, RNG: HCPCS | Performed by: FAMILY MEDICINE

## 2017-05-24 PROCEDURE — 99213 OFFICE O/P EST LOW 20 MIN: CPT | Performed by: FAMILY MEDICINE

## 2017-05-24 NOTE — MR AVS SNAPSHOT
"Price Moon   2017 3:40 PM   Office Visit   MRN: [id number]    Department:  [address] [place] [address]   Dept Phone:  [phone]    Description:  Male : 1945   Provider:  Jaquelin Mesa M.D.           Reason for Visit     Cough           Allergies as of 2017     No Known Allergies      Vital Signs     Blood Pressure Pulse Temperature Height Weight Body Mass Index    122/70 mmHg 95 36.7 °C (98 °F) 1.778 m (5' 10\") 115.849 kg (255 lb 6.4 oz) 36.65 kg/m2    Oxygen Saturation Smoking Status                93% Former Smoker          Basic Information     Date Of Birth Sex Race Ethnicity Preferred Language    1945 Male White Non- English      Your appointments     Oct 26, 2017  8:00 AM   ANNUAL EXAM PREVENTATIVE with Joann Wakefield M.D.   [address] [facility] [id number] [phone]              Problem List              ICD-10-CM Priority Class Noted - Resolved    Eye problem H57.9   2016 - Present    BPH (benign prostatic hyperplasia) N40.0   2016 - Present    Hyperlipidemia LDL goal <100 E78.5   2016 - Present    Preventative health care Z00.00   2016 - Present    Hypothyroidism due to acquired atrophy of thyroid E03.4   2016 - Present    Bradycardia with 51-60 beats per minute R00.1   10/7/2016 - Present    Obesity (BMI 35.0-39.9 without comorbidity) (HCC) E66.9   2017 - Present    Rash R21   2017 - Present    Neck pain, bilateral M54.2   2017 - Present      Health Maintenance        Date Due Completion Dates    IMM DTaP/Tdap/Td Vaccine (1 - Tdap) 2023 (Originally 2013) 2013    COLONOSCOPY 2024 (Prv Comp)    Override on 2014: Previously completed            Current Immunizations     13-VALENT PCV PREVNAR 2017    Influenza Vaccine Adult HD 10/7/2016  9:04 AM    Influenza Vaccine Quad Inj (Pf) 11/10/2014    Pneumococcal polysaccharide vaccine (PPSV-23) " 5/24/2012    SHINGLES VACCINE 8/8/2015    TD Vaccine 2/27/2013      Below and/or attached are the medications your provider expects you to take. Review all of your home medications and newly ordered medications with your provider and/or pharmacist. Follow medication instructions as directed by your provider and/or pharmacist. Please keep your medication list with you and share with your provider. Update the information when medications are discontinued, doses are changed, or new medications (including over-the-counter products) are added; and carry medication information at all times in the event of emergency situations     Allergies:  No Known Allergies          Medications  Valid as of: May 24, 2017 -  4:49 PM    Generic Name Brand Name Tablet Size Instructions for use    Albuterol Sulfate (Aero Soln) albuterol 108 (90 BASE) MCG/ACT Inhale 2 Puffs by mouth every 6 hours as needed for Shortness of Breath.        Amoxicillin-Pot Clavulanate (Tab) AUGMENTIN 875-125 MG Take 1 Tab by mouth 2 times a day.        Atorvastatin Calcium (Tab) LIPITOR 20 MG Take 1 Tab by mouth every bedtime.        Benzonatate (Cap) TESSALON 100 MG Take 1 Cap by mouth 3 times a day as needed for Cough.        Bimatoprost (Solution) LUMIGAN 0.01 % Place 1 Drop in both eyes every bedtime.        Fluticasone Propionate (Suspension) FLONASE 50 MCG/ACT Spray 2 Sprays in nose every day. Each Nostril        Guaifenesin-Codeine (Solution) ROBITUSSIN -10 mg/5mL Take 5 mL by mouth every 6 hours as needed for Cough.        Levothyroxine Sodium (Tab) SYNTHROID 100 MCG Take 1 Tab by mouth Every morning on an empty stomach.        Misc Natural Products   Take  by mouth.        Red Yeast Rice Extract   Take  by mouth.        Triamcinolone Acetonide (Cream) KENALOG 0.1 % Apply  to affected area(s) 2 times a day.        .                 Medicines prescribed today were sent to:     [facility]/PHARMACY #[id number] - [place] - [address]    [address] [place] [address]    Phone: [phone] Fax: [phone]    Open 24 Hours?: No    [facility] - [place], [place] - [address] AT PORTAL TO REGISTERED [facility] SITES    [address] [facility] [address]    Phone: [phone] Fax: [phone]    Open 24 Hours?: No      Medication refill instructions:       If your prescription bottle indicates you have medication refills left, it is not necessary to call your provider’s office. Please contact your pharmacy and they will refill your medication.    If your prescription bottle indicates you do not have any refills left, you may request refills at any time through one of the following ways: The online [employer] system (except Urgent Care), by calling your provider’s office, or by asking your pharmacy to contact your provider’s office with a refill request. Medication refills are processed only during regular business hours and may not be available until the next business day. Your provider may request additional information or to have a follow-up visit with you prior to refilling your medication.   *Please Note: Medication refills are assigned a new Rx number when refilled electronically. Your pharmacy may indicate that no refills were authorized even though a new prescription for the same medication is available at the pharmacy. Please request the medicine by name with the pharmacy before contacting your provider for a refill.           [employer] Access Code: Activation code not generated  Current [employer] Status: Active

## 2017-05-24 NOTE — PROGRESS NOTES
SUBJECTIVE:    Chief Complaint   Patient presents with   • Cough       HPI:     Price Moon is a 71 y.o. male here for follow up of cough symptom.   Overall feeling better with symptoms. Has a few days of antibiotic therapy left. Taking cough medication at night.   Ears still feel plugged up current. Would like to consider flushing them. Mostly right side symptoms. Not using nasal spray regularly.       ROS:  Denies current recent fevers or chills. No nausea or vomiting. No diarrhea. No chest pains or shortness of breath. No lower extremity edema.    Current Outpatient Prescriptions on File Prior to Visit   Medication Sig Dispense Refill   • amoxicillin-clavulanate (AUGMENTIN) 875-125 MG Tab Take 1 Tab by mouth 2 times a day. 20 Tab 0   • benzonatate (TESSALON) 100 MG Cap Take 1 Cap by mouth 3 times a day as needed for Cough. 60 Cap 0   • guaifenesin-codeine (ROBITUSSIN AC) Solution oral solution Take 5 mL by mouth every 6 hours as needed for Cough. 150 mL 0   • Red Yeast Rice Extract (RED YEAST RICE PO) Take  by mouth.     • Misc Natural Products (PROSTATE HEALTH PO) Take  by mouth.     • triamcinolone acetonide (KENALOG) 0.1 % Cream Apply  to affected area(s) 2 times a day.     • atorvastatin (LIPITOR) 20 MG Tab Take 1 Tab by mouth every bedtime. 90 Tab 3   • levothyroxine (SYNTHROID) 100 MCG Tab Take 1 Tab by mouth Every morning on an empty stomach. 90 Tab 3   • bimatoprost (LUMIGAN) 0.01 % Solution Place 1 Drop in both eyes every bedtime.     • fluticasone (FLONASE) 50 MCG/ACT nasal spray Spray 2 Sprays in nose every day. Each Nostril (Patient not taking: Reported on 5/24/2017) 16 g 3   • albuterol 108 (90 BASE) MCG/ACT Aero Soln inhalation aerosol Inhale 2 Puffs by mouth every 6 hours as needed for Shortness of Breath. (Patient not taking: Reported on 5/24/2017) 8.5 g 0     No current facility-administered medications on file prior to visit.       No Known Allergies    Past Medical History   Diagnosis Date   •  "Thyroid disease    • Hyperlipidemia    • BPH (benign prostatic hyperplasia)          OBJECTIVE:   /70 mmHg  Pulse 95  Temp(Src) 36.7 °C (98 °F)  Ht 1.778 m (5' 10\")  Wt 115.849 kg (255 lb 6.4 oz)  BMI 36.65 kg/m2  SpO2 93%  General: Well-developed well-nourished male, no acute distress  HEENT: eyes clear, left TM appears injected. Right ear canal with some ear cerumen, possible film on TM noted after flushing  Neck: supple, no lymphadenopathy- cervical or supraclavicular, no thyromegaly  Cardiovascular: regular rate and rhythm, no murmurs, gallops, rubs  Lungs: clear to auscultation bilaterally, no wheezes, crackles, or rhonchi  Extremities: no cyanosis, clubbing, edema  Skin: Warm and dry  Psych: appropriate mood and affect    ASSESSMENT/PLAN:    71 year old male.     1. Acute non-recurrent maxillary sinusitis- improved. Continue antibiotic therapy until complete.      2. Cough- slight RAD/wheezing improved.      3. Eustachian tube dysfunction, bilateral- currently without improvements.   Recommend routine use of flonase spray. Possible residual mild film on right TM after flushing. Recommend follow up exam if no further improvement of symptoms in 2 weeks.          Return if symptoms worsen or fail to improve.    This medical record contains text that has been entered with the assistance of computer voice recognition and dictation software.  Therefore, it may contain unintended errors in text, spelling, punctuation, or grammar.      > 15 minutes spent with this patient, > 10 minutes of time spent on counseling and coordination of care.                         "

## 2017-06-05 ENCOUNTER — OFFICE VISIT (OUTPATIENT)
Dept: URGENT CARE | Facility: CLINIC | Age: 72
End: 2017-06-05
Payer: MEDICARE

## 2017-06-05 VITALS
WEIGHT: 255 LBS | TEMPERATURE: 98.1 F | DIASTOLIC BLOOD PRESSURE: 72 MMHG | RESPIRATION RATE: 20 BRPM | BODY MASS INDEX: 36.51 KG/M2 | SYSTOLIC BLOOD PRESSURE: 124 MMHG | HEIGHT: 70 IN | OXYGEN SATURATION: 91 % | HEART RATE: 84 BPM

## 2017-06-05 DIAGNOSIS — J40 BRONCHITIS: ICD-10-CM

## 2017-06-05 DIAGNOSIS — H93.8X3 SENSATION OF FULLNESS IN BOTH EARS: ICD-10-CM

## 2017-06-05 DIAGNOSIS — H69.93 EUSTACHIAN TUBE DYSFUNCTION, BILATERAL: ICD-10-CM

## 2017-06-05 PROCEDURE — 4040F PNEUMOC VAC/ADMIN/RCVD: CPT | Performed by: PHYSICIAN ASSISTANT

## 2017-06-05 PROCEDURE — 3017F COLORECTAL CA SCREEN DOC REV: CPT | Performed by: PHYSICIAN ASSISTANT

## 2017-06-05 PROCEDURE — 99214 OFFICE O/P EST MOD 30 MIN: CPT | Performed by: PHYSICIAN ASSISTANT

## 2017-06-05 PROCEDURE — G8419 CALC BMI OUT NRM PARAM NOF/U: HCPCS | Performed by: PHYSICIAN ASSISTANT

## 2017-06-05 PROCEDURE — 1101F PT FALLS ASSESS-DOCD LE1/YR: CPT | Performed by: PHYSICIAN ASSISTANT

## 2017-06-05 PROCEDURE — G8432 DEP SCR NOT DOC, RNG: HCPCS | Performed by: PHYSICIAN ASSISTANT

## 2017-06-05 PROCEDURE — 1036F TOBACCO NON-USER: CPT | Performed by: PHYSICIAN ASSISTANT

## 2017-06-05 RX ORDER — DOXYCYCLINE HYCLATE 100 MG
100 TABLET ORAL 2 TIMES DAILY
Qty: 10 TAB | Refills: 0 | Status: SHIPPED | OUTPATIENT
Start: 2017-06-05 | End: 2017-06-10

## 2017-06-05 ASSESSMENT — ENCOUNTER SYMPTOMS
WHEEZING: 1
MYALGIAS: 1
FEVER: 0
NECK PAIN: 0
SHORTNESS OF BREATH: 0
TINGLING: 0
EYE REDNESS: 0
EYE DISCHARGE: 0
DIZZINESS: 0
ABDOMINAL PAIN: 0
SORE THROAT: 1
COUGH: 1
CHILLS: 0
RHINORRHEA: 0
DIARRHEA: 0
VOMITING: 0
SPUTUM PRODUCTION: 1
HEADACHES: 0

## 2017-06-05 NOTE — MR AVS SNAPSHOT
"Price Moon   2017 4:45 PM   Office Visit   MRN: [id number]    Department:  [facility]   Dept Phone:  [phone]    Description:  Male : 1945   Provider:  Aron Duke PA-C           Reason for Visit     Cough started tuesday, congestion, can not hear for 3 weeks       Allergies as of 2017     No Known Allergies      You were diagnosed with     Bronchitis   [[id number]]         Vital Signs     Blood Pressure Pulse Temperature Respirations Height Weight    124/72 mmHg 84 36.7 °C (98.1 °F) 20 1.778 m (5' 10\") 115.667 kg (255 lb)    Body Mass Index Oxygen Saturation Smoking Status             36.59 kg/m2 91% Former Smoker         Basic Information     Date Of Birth Sex Race Ethnicity Preferred Language    1945 Male White Non- English      Your appointments     2017  5:20 PM   Established Patient with Cortney Delong M.D.   [address])     [facility] [place] [id number] [phone]           You will be receiving a confirmation call a few days before your appointment from our automated call confirmation system.            Oct 26, 2017  8:00 AM   ANNUAL EXAM PREVENTATIVE with Joann Wakefield M.D.   [address] [facility]    [address] [facility] [place] [id number] [phone]              Problem List              ICD-10-CM Priority Class Noted - Resolved    Eye problem H57.9   2016 - Present    BPH (benign prostatic hyperplasia) N40.0   2016 - Present    Hyperlipidemia LDL goal <100 E78.5   2016 - Present    Preventative health care Z00.00   2016 - Present    Hypothyroidism due to acquired atrophy of thyroid E03.4   2016 - Present    Bradycardia with 51-60 beats per minute R00.1   10/7/2016 - Present    Obesity (BMI 35.0-39.9 without comorbidity) (HCC) E66.9   2017 - Present    Rash R21   2017 - Present    Neck pain, bilateral M54.2   2017 - Present      "   Health Maintenance        Date Due Completion Dates    IMM DTaP/Tdap/Td Vaccine (1 - Tdap) 2/28/2023 (Originally 2/28/2013) 2/27/2013    COLONOSCOPY 7/30/2024 7/30/2014 (Prv Comp)    Override on 7/30/2014: Previously completed            Current Immunizations     13-VALENT PCV PREVNAR 1/20/2017    Influenza Vaccine Adult HD 10/7/2016  9:04 AM    Influenza Vaccine Quad Inj (Pf) 11/10/2014    Pneumococcal polysaccharide vaccine (PPSV-23) 5/24/2012    SHINGLES VACCINE 8/8/2015    TD Vaccine 2/27/2013      Below and/or attached are the medications your provider expects you to take. Review all of your home medications and newly ordered medications with your provider and/or pharmacist. Follow medication instructions as directed by your provider and/or pharmacist. Please keep your medication list with you and share with your provider. Update the information when medications are discontinued, doses are changed, or new medications (including over-the-counter products) are added; and carry medication information at all times in the event of emergency situations     Allergies:  No Known Allergies          Medications  Valid as of: June 05, 2017 -  5:10 PM    Generic Name Brand Name Tablet Size Instructions for use    Albuterol Sulfate (Aero Soln) albuterol 108 (90 BASE) MCG/ACT Inhale 2 Puffs by mouth every 6 hours as needed for Shortness of Breath.        Amoxicillin-Pot Clavulanate (Tab) AUGMENTIN 875-125 MG Take 1 Tab by mouth 2 times a day.        Atorvastatin Calcium (Tab) LIPITOR 20 MG Take 1 Tab by mouth every bedtime.        Benzonatate (Cap) TESSALON 100 MG Take 1 Cap by mouth 3 times a day as needed for Cough.        Bimatoprost (Solution) LUMIGAN 0.01 % Place 1 Drop in both eyes every bedtime.        Doxycycline Hyclate (Tab) VIBRAMYCIN 100 MG Take 1 Tab by mouth 2 times a day for 5 days.        Fluticasone Propionate (Suspension) FLONASE 50 MCG/ACT Spray 2 Sprays in nose every day. Each Nostril         Guaifenesin-Codeine (Solution) ROBITUSSIN -10 mg/5mL Take 5 mL by mouth every 6 hours as needed for Cough.        Levothyroxine Sodium (Tab) SYNTHROID 100 MCG Take 1 Tab by mouth Every morning on an empty stomach.        Misc Natural Products   Take  by mouth.        Red Yeast Rice Extract   Take  by mouth.        Triamcinolone Acetonide (Cream) KENALOG 0.1 % Apply  to affected area(s) 2 times a day.        .                 Medicines prescribed today were sent to:     [id number] - [place], [place] - [address]    [address] [place] [address]    Phone: [phone] Fax: [phone]    Open 24 Hours?: No    [facility] - [place], [place] - [address] AT PORTAL TO REGISTERED [facility]    [address] [facility] [address]    Phone: [phone] Fax: [phone]    Open 24 Hours?: No      Medication refill instructions:       If your prescription bottle indicates you have medication refills left, it is not necessary to call your provider’s office. Please contact your pharmacy and they will refill your medication.    If your prescription bottle indicates you do not have any refills left, you may request refills at any time through one of the following ways: The online [employer] system (except Urgent Care), by calling your provider’s office, or by asking your pharmacy to contact your provider’s office with a refill request. Medication refills are processed only during regular business hours and may not be available until the next business day. Your provider may request additional information or to have a follow-up visit with you prior to refilling your medication.   *Please Note: Medication refills are assigned a new Rx number when refilled electronically. Your pharmacy may indicate that no refills were authorized even though a new prescription for the same medication is available at the pharmacy. Please request the medicine by name with the pharmacy before contacting  your provider for a refill.           MyChart Access Code: Activation code not generated  Current MyChart Status: Active

## 2017-06-06 ENCOUNTER — APPOINTMENT (OUTPATIENT)
Dept: MEDICAL GROUP | Age: 72
End: 2017-06-06
Payer: MEDICARE

## 2017-06-06 NOTE — PROGRESS NOTES
Subjective:      Price Moon is a 71 y.o. male who presents with Cough          Pt is 72 y/o male who presents with dry cough and continued decreased hearing and ear pressure for the last 3 weeks. Pt. Was originally evaluated by his PCP who started him on Augmentin and reports improvement with his cough- also written Tessalon and codeine. He notes that he has not had improvement with his ear fullness however. He then returned for ear flush- of which this did not help symptoms. He returns as his cough returned the last 5 days and last night was the worse as he had a few coughing fits. His wife is also ill with similar symptoms as well.   Cough  This is a recurrent problem. Episode onset: 5 days ago. The problem occurs every few minutes. The cough is non-productive. Associated symptoms include ear congestion, myalgias, postnasal drip, a sore throat and wheezing. Pertinent negatives include no chest pain, chills, ear pain, eye redness, fever, headaches, rash, rhinorrhea or shortness of breath. The symptoms are aggravated by exercise and fumes. Treatments tried: Tessalon. The treatment provided mild relief. There is no history of asthma, bronchitis or pneumonia.       Review of Systems   Constitutional: Negative for fever, chills and malaise/fatigue.   HENT: Positive for congestion, postnasal drip and sore throat. Negative for ear discharge, ear pain and rhinorrhea.    Eyes: Negative for discharge and redness.   Respiratory: Positive for cough, sputum production and wheezing. Negative for shortness of breath.    Cardiovascular: Negative for chest pain and leg swelling.   Gastrointestinal: Negative for vomiting, abdominal pain and diarrhea.   Genitourinary: Negative for dysuria and urgency.   Musculoskeletal: Positive for myalgias. Negative for neck pain.   Skin: Negative for itching and rash.   Neurological: Negative for dizziness, tingling and headaches.          Objective:     /72 mmHg  Pulse 84  Temp(Src)  "36.7 °C (98.1 °F)  Resp 20  Ht 1.778 m (5' 10\")  Wt 115.667 kg (255 lb)  BMI 36.59 kg/m2  SpO2 91%   PMH:  has a past medical history of Thyroid disease; Hyperlipidemia; and BPH (benign prostatic hyperplasia).  MEDS:   Current outpatient prescriptions:   •  doxycycline (VIBRAMYCIN) 100 MG Tab, Take 1 Tab by mouth 2 times a day for 5 days., Disp: 10 Tab, Rfl: 0  •  fluticasone (FLONASE) 50 MCG/ACT nasal spray, Spray 2 Sprays in nose every day. Each Nostril (Patient not taking: Reported on 5/24/2017), Disp: 16 g, Rfl: 3  •  amoxicillin-clavulanate (AUGMENTIN) 875-125 MG Tab, Take 1 Tab by mouth 2 times a day., Disp: 20 Tab, Rfl: 0  •  benzonatate (TESSALON) 100 MG Cap, Take 1 Cap by mouth 3 times a day as needed for Cough., Disp: 60 Cap, Rfl: 0  •  guaifenesin-codeine (ROBITUSSIN AC) Solution oral solution, Take 5 mL by mouth every 6 hours as needed for Cough., Disp: 150 mL, Rfl: 0  •  albuterol 108 (90 BASE) MCG/ACT Aero Soln inhalation aerosol, Inhale 2 Puffs by mouth every 6 hours as needed for Shortness of Breath. (Patient not taking: Reported on 5/24/2017), Disp: 8.5 g, Rfl: 0  •  Red Yeast Rice Extract (RED YEAST RICE PO), Take  by mouth., Disp: , Rfl:   •  Misc Natural Products (PROSTATE HEALTH PO), Take  by mouth., Disp: , Rfl:   •  triamcinolone acetonide (KENALOG) 0.1 % Cream, Apply  to affected area(s) 2 times a day., Disp: , Rfl:   •  atorvastatin (LIPITOR) 20 MG Tab, Take 1 Tab by mouth every bedtime., Disp: 90 Tab, Rfl: 3  •  levothyroxine (SYNTHROID) 100 MCG Tab, Take 1 Tab by mouth Every morning on an empty stomach., Disp: 90 Tab, Rfl: 3  •  bimatoprost (LUMIGAN) 0.01 % Solution, Place 1 Drop in both eyes every bedtime., Disp: , Rfl:   ALLERGIES: No Known Allergies  SURGHX:   Past Surgical History   Procedure Laterality Date   • Hernia repair     • Eye surgery       SOCHX:  reports that he quit smoking about 32 years ago. His smoking use included Cigarettes. He has never used smokeless tobacco. He " reports that he does not drink alcohol or use illicit drugs.  FH: Family history was reviewed, no pertinent findings to report    Physical Exam   Constitutional: He is oriented to person, place, and time. He appears well-developed and well-nourished.   HENT:   Head: Normocephalic and atraumatic.   Mouth/Throat: No oropharyngeal exudate.   Ears- Canals clear- TM- with clear fluid effusions bilaterally.   Pos. PND, with slight erythema- without tonsillar edema or exudate.   Mild discharge noted bilaterally- to nares.      Eyes: EOM are normal. Pupils are equal, round, and reactive to light.   Neck: Normal range of motion. Neck supple.   Cardiovascular: Normal rate and regular rhythm.    No murmur heard.  Pulmonary/Chest: Effort normal and breath sounds normal. No respiratory distress.   Musculoskeletal: Normal range of motion. He exhibits no tenderness.   Lymphadenopathy:     He has no cervical adenopathy.   Neurological: He is alert and oriented to person, place, and time.   Skin: Skin is warm. No rash noted.   Psychiatric: He has a normal mood and affect. His behavior is normal.   Vitals reviewed.              Assessment/Plan:     1. Bronchitis  Contingent abx was written today- as I encouraged the pt. To trial the therapies he had at home- also increase fluids, avoid night time dairy, humidification. Pt is to start ABX therapy based on the guidelines discussed.     2. Sensation of fullness in both ears  3. Eustachian tube dysfunction, bilateral    Finally referral to ENT was written today as the pt. Still remains with ear fullness and intermittent pain- without any evidence of infection- without any improvement with OTC Flonase.     Patient given precautionary s/sx that mandate immediate follow up and evaluation in the ED. Advised of risks of not doing so.    DDX, Supportive care, and indications for immediate follow-up discussed with patient.    Instructed to return to clinic or nearest emergency department if we  are not available for any change in condition, further concerns, or worsening of symptoms.    The patient demonstrated a good understanding and agreed with the treatment plan.

## 2017-06-07 ENCOUNTER — TELEPHONE (OUTPATIENT)
Dept: MEDICAL GROUP | Age: 72
End: 2017-06-07

## 2017-06-07 DIAGNOSIS — H93.8X9 EAR FULLNESS, UNSPECIFIED LATERALITY: ICD-10-CM

## 2017-06-07 NOTE — TELEPHONE ENCOUNTER
----- Message from Isis Snider, Med Ass't sent at 6/7/2017 11:33 AM PDT -----  Regarding: Referral needed-Audiology  Hello,   Patient has a scheduled appointment with the audiologist tomorrow morning. ([facility]).  Could one of you ladies place an Urgent referral.  Patient will need this done prior to seeing Dr. Oviedo for ENT.  Let me know if any questions.    Thank you,  Isis DHALIWAL [facility] [phone]

## 2017-06-07 NOTE — TELEPHONE ENCOUNTER
Referral to ENT was in the system and was ordered on 6/5/17 from Urgent care. Please assist what patient needs.    Joann aWkefield M.D.

## 2017-06-08 ENCOUNTER — HOSPITAL ENCOUNTER (EMERGENCY)
Facility: MEDICAL CENTER | Age: 72
End: 2017-06-08
Attending: EMERGENCY MEDICINE
Payer: MEDICARE

## 2017-06-08 ENCOUNTER — APPOINTMENT (OUTPATIENT)
Dept: RADIOLOGY | Facility: MEDICAL CENTER | Age: 72
End: 2017-06-08
Attending: EMERGENCY MEDICINE
Payer: MEDICARE

## 2017-06-08 VITALS
HEIGHT: 70 IN | TEMPERATURE: 97.7 F | BODY MASS INDEX: 37.21 KG/M2 | RESPIRATION RATE: 18 BRPM | OXYGEN SATURATION: 92 % | HEART RATE: 61 BPM | DIASTOLIC BLOOD PRESSURE: 82 MMHG | WEIGHT: 259.92 LBS | SYSTOLIC BLOOD PRESSURE: 136 MMHG

## 2017-06-08 DIAGNOSIS — R91.1 PULMONARY NODULE: ICD-10-CM

## 2017-06-08 DIAGNOSIS — J06.9 VIRAL URI WITH COUGH: ICD-10-CM

## 2017-06-08 LAB
ANION GAP SERPL CALC-SCNC: 8 MMOL/L (ref 0–11.9)
BASOPHILS # BLD AUTO: 0.3 % (ref 0–1.8)
BASOPHILS # BLD: 0.02 K/UL (ref 0–0.12)
BNP SERPL-MCNC: 46 PG/ML (ref 0–100)
BUN SERPL-MCNC: 23 MG/DL (ref 8–22)
CALCIUM SERPL-MCNC: 8.6 MG/DL (ref 8.4–10.2)
CHLORIDE SERPL-SCNC: 106 MMOL/L (ref 96–112)
CO2 SERPL-SCNC: 25 MMOL/L (ref 20–33)
CREAT SERPL-MCNC: 1.07 MG/DL (ref 0.5–1.4)
EKG IMPRESSION: NORMAL
EOSINOPHIL # BLD AUTO: 0.01 K/UL (ref 0–0.51)
EOSINOPHIL NFR BLD: 0.1 % (ref 0–6.9)
ERYTHROCYTE [DISTWIDTH] IN BLOOD BY AUTOMATED COUNT: 45.1 FL (ref 35.9–50)
GFR SERPL CREATININE-BSD FRML MDRD: >60 ML/MIN/1.73 M 2
GLUCOSE SERPL-MCNC: 135 MG/DL (ref 65–99)
HCT VFR BLD AUTO: 42.6 % (ref 42–52)
HGB BLD-MCNC: 14.4 G/DL (ref 14–18)
IMM GRANULOCYTES # BLD AUTO: 0.04 K/UL (ref 0–0.11)
IMM GRANULOCYTES NFR BLD AUTO: 0.5 % (ref 0–0.9)
LYMPHOCYTES # BLD AUTO: 1.64 K/UL (ref 1–4.8)
LYMPHOCYTES NFR BLD: 21.9 % (ref 22–41)
MCH RBC QN AUTO: 30.3 PG (ref 27–33)
MCHC RBC AUTO-ENTMCNC: 33.8 G/DL (ref 33.7–35.3)
MCV RBC AUTO: 89.5 FL (ref 81.4–97.8)
MONOCYTES # BLD AUTO: 0.42 K/UL (ref 0–0.85)
MONOCYTES NFR BLD AUTO: 5.6 % (ref 0–13.4)
NEUTROPHILS # BLD AUTO: 5.37 K/UL (ref 1.82–7.42)
NEUTROPHILS NFR BLD: 71.6 % (ref 44–72)
NRBC # BLD AUTO: 0 K/UL
NRBC BLD AUTO-RTO: 0 /100 WBC
PLATELET # BLD AUTO: 203 K/UL (ref 164–446)
PMV BLD AUTO: 9.7 FL (ref 9–12.9)
POTASSIUM SERPL-SCNC: 4.2 MMOL/L (ref 3.6–5.5)
RBC # BLD AUTO: 4.76 M/UL (ref 4.7–6.1)
SODIUM SERPL-SCNC: 139 MMOL/L (ref 135–145)
WBC # BLD AUTO: 7.5 K/UL (ref 4.8–10.8)

## 2017-06-08 PROCEDURE — 83880 ASSAY OF NATRIURETIC PEPTIDE: CPT

## 2017-06-08 PROCEDURE — 71020 DX-CHEST-2 VIEWS: CPT

## 2017-06-08 PROCEDURE — 85025 COMPLETE CBC W/AUTO DIFF WBC: CPT

## 2017-06-08 PROCEDURE — 99283 EMERGENCY DEPT VISIT LOW MDM: CPT

## 2017-06-08 PROCEDURE — 36415 COLL VENOUS BLD VENIPUNCTURE: CPT

## 2017-06-08 PROCEDURE — 80048 BASIC METABOLIC PNL TOTAL CA: CPT

## 2017-06-08 PROCEDURE — 93005 ELECTROCARDIOGRAM TRACING: CPT | Performed by: EMERGENCY MEDICINE

## 2017-06-08 ASSESSMENT — PAIN SCALES - GENERAL: PAINLEVEL_OUTOF10: 0

## 2017-06-08 NOTE — TELEPHONE ENCOUNTER
Pt is needing a referral to [facility]. Not to ENT. Referral pended in  and marked as urgent.

## 2017-06-08 NOTE — ED AVS SNAPSHOT
Home Care Instructions                                                                                                                Price Moon   MRN: [id number]    Department:  [facility], Emergency Dept   Date of Visit:  6/8/2017            [facility], Emergency Dept    [address]    [place] [address]    Phone:  [phone]      You were seen by     Librado Neville M.D.      Your Diagnosis Was     Viral URI with cough     J06.9, B97.89       Follow-up Information     1. Follow up with Joann Wakefield M.D..    Specialty:  Internal Medicine    Contact information    [address] [place] [address] [phone]        Medication Information     Review all of your home medications and newly ordered medications with your primary doctor and/or pharmacist as soon as possible. Follow medication instructions as directed by your doctor and/or pharmacist.     Please keep your complete medication list with you and share with your physician. Update the information when medications are discontinued, doses are changed, or new medications (including over-the-counter products) are added; and carry medication information at all times in the event of emergency situations.               Medication List      ASK your doctor about these medications        Instructions    Morning Afternoon Evening Bedtime    albuterol 108 (90 BASE) MCG/ACT Aers inhalation aerosol        Inhale 2 Puffs by mouth every 6 hours as needed for Shortness of Breath.   Dose:  2 Puff                        amoxicillin-clavulanate 875-125 MG Tabs   Commonly known as:  AUGMENTIN        Take 1 Tab by mouth 2 times a day.   Dose:  1 Tab                        atorvastatin 20 MG Tabs   Commonly known as:  LIPITOR        Take 1 Tab by mouth every bedtime.   Dose:  20 mg                        benzonatate 100 MG Caps   Commonly known as:  TESSALON        Take 1 Cap by mouth 3 times a day as needed for  Cough.   Dose:  100 mg                        bimatoprost 0.01 % Soln   Commonly known as:  LUMIGAN        Place 1 Drop in both eyes every bedtime.   Dose:  1 Drop                        doxycycline 100 MG Tabs   Commonly known as:  VIBRAMYCIN        Take 1 Tab by mouth 2 times a day for 5 days.   Dose:  100 mg                        fluticasone 50 MCG/ACT nasal spray   Commonly known as:  FLONASE        Spray 2 Sprays in nose every day. Each Nostril   Dose:  2 Spray                        guaifenesin-codeine Soln oral solution   Commonly known as:  ROBITUSSIN AC        Take 5 mL by mouth every 6 hours as needed for Cough.   Dose:  5 mL                        levothyroxine 100 MCG Tabs   Commonly known as:  SYNTHROID        Take 1 Tab by mouth Every morning on an empty stomach.   Dose:  100 mcg                        PROSTATE HEALTH PO        Take  by mouth.                        RED YEAST RICE PO        Take  by mouth.                        triamcinolone acetonide 0.1 % Crea   Commonly known as:  KENALOG        Apply  to affected area(s) 2 times a day.                                Procedures and tests performed during your visit     Basic Metabolic Panel (BMP)    Btype Natriuretic Peptide    CBC w/ Differential    DX-CHEST-2 VIEWS    EKG (NOW)    ESTIMATED GFR    NURSING COMMUNICATION        Discharge Instructions       Bronchitis  You can add OTC NSAIDs, Mucinex, phenylephrine, nasal rinses to your current management.    Please start the antibiotics as previously prescribed      Bronchitis is the body's way of reacting to injury and/or infection (inflammation) of the bronchi. Bronchi are the air tubes that extend from the windpipe into the lungs. If the inflammation becomes severe, it may cause shortness of breath.  CAUSES   Inflammation may be caused by:  · A virus.  · Germs (bacteria).  · Dust.  · Allergens.  · Pollutants and many other irritants.  The cells lining the bronchial tree are covered with tiny  hairs (cilia). These constantly beat upward, away from the lungs, toward the mouth. This keeps the lungs free of pollutants. When these cells become too irritated and are unable to do their job, mucus begins to develop. This causes the characteristic cough of bronchitis. The cough clears the lungs when the cilia are unable to do their job. Without either of these protective mechanisms, the mucus would settle in the lungs. Then you would develop pneumonia.  Smoking is a common cause of bronchitis and can contribute to pneumonia. Stopping this habit is the single most important thing you can do to help yourself.  TREATMENT   · Your caregiver may prescribe an antibiotic if the cough is caused by bacteria. Also, medicines that open up your airways make it easier to breathe. Your caregiver may also recommend or prescribe an expectorant. It will loosen the mucus to be coughed up. Only take over-the-counter or prescription medicines for pain, discomfort, or fever as directed by your caregiver.  · Removing whatever causes the problem (smoking, for example) is critical to preventing the problem from getting worse.  · Cough suppressants may be prescribed for relief of cough symptoms.  · Inhaled medicines may be prescribed to help with symptoms now and to help prevent problems from returning.  · For those with recurrent (chronic) bronchitis, there may be a need for steroid medicines.  SEEK IMMEDIATE MEDICAL CARE IF:   · During treatment, you develop more pus-like mucus (purulent sputum).  · You have a fever.  · Your baby is older than 3 months with a rectal temperature of 102° F (38.9° C) or higher.  · Your baby is 3 months old or younger with a rectal temperature of 100.4° F (38° C) or higher.  · You become progressively more ill.  · You have increased difficulty breathing, wheezing, or shortness of breath.  It is necessary to seek immediate medical care if you are elderly or sick from any other disease.  MAKE SURE YOU:    · Understand these instructions.  · Will watch your condition.  · Will get help right away if you are not doing well or get worse.  Document Released: 12/18/2006 Document Revised: 03/11/2013 Document Reviewed: 10/27/2009  ExitCare® Patient Information ©2014 ExitCare[employer] LLC.  Gastroesophageal Reflux Disease, Adult  Gastroesophageal reflux disease (GERD) happens when acid from your stomach goes into your food pipe (esophagus). The acid can cause a burning feeling in your chest. Over time, the acid can make small holes (ulcers) in your food pipe.   HOME CARE  · Ask your doctor for advice about:  ¨ Losing weight.  ¨ Quitting smoking.  ¨ Alcohol use.  · Avoid foods and drinks that make your problems worse. You may want to avoid:  ¨ Caffeine and alcohol.  ¨ Chocolate.  ¨ Mints.  ¨ Garlic and onions.  ¨ Spicy foods.  ¨ Citrus fruits, such as oranges, julian, or limes.  ¨ Foods that contain tomato, such as sauce, chili, salsa, and pizza.  ¨ Fried and fatty foods.  · Avoid lying down for 3 hours before you go to bed or before you take a nap.  · Eat small meals often, instead of large meals.  · Wear loose-fitting clothing. Do not wear anything tight around your waist.  · Raise (elevate) the head of your bed 6 to 8 inches with wood blocks. Using extra pillows does not help.  · Only take medicines as told by your doctor.  · Do not take aspirin or ibuprofen.  GET HELP RIGHT AWAY IF:   · You have pain in your arms, neck, jaw, teeth, or back.  · Your pain gets worse or changes.  · You feel sick to your stomach (nauseous), throw up (vomit), or sweat (diaphoresis).  · You feel short of breath, or you pass out (faint).  · Your throw up is green, yellow, black, or looks like coffee grounds or blood.  · Your poop (stool) is red, bloody, or black.  MAKE SURE YOU:   · Understand these instructions.  · Will watch your condition.  · Will get help right away if you are not doing well or get worse.     This information is not intended to  replace advice given to you by your health care provider. Make sure you discuss any questions you have with your health care provider.     Document Released: 06/05/2009 Document Revised: 03/11/2013 Document Reviewed: 04/13/2016  [employer] Interactive Patient Education ©2016 [employer]  Sinusitis, Adult  Sinusitis is redness, soreness, and inflammation of the paranasal sinuses. Paranasal sinuses are air pockets within the bones of your face. They are located beneath your eyes, in the middle of your forehead, and above your eyes. In healthy paranasal sinuses, mucus is able to drain out, and air is able to circulate through them by way of your nose. However, when your paranasal sinuses are inflamed, mucus and air can become trapped. This can allow bacteria and other germs to grow and cause infection.  Sinusitis can develop quickly and last only a short time (acute) or continue over a long period (chronic). Sinusitis that lasts for more than 12 weeks is considered chronic.  CAUSES  Causes of sinusitis include:  · Allergies.  · Structural abnormalities, such as displacement of the cartilage that separates your nostrils (deviated septum), which can decrease the air flow through your nose and sinuses and affect sinus drainage.  · Functional abnormalities, such as when the small hairs (cilia) that line your sinuses and help remove mucus do not work properly or are not present.  SIGNS AND SYMPTOMS  Symptoms of acute and chronic sinusitis are the same. The primary symptoms are pain and pressure around the affected sinuses. Other symptoms include:  · Upper toothache.  · Earache.  · Headache.  · Bad breath.  · Decreased sense of smell and taste.  · A cough, which worsens when you are lying flat.  · Fatigue.  · Fever.  · Thick drainage from your nose, which often is green and may contain pus (purulent).  · Swelling and warmth over the affected sinuses.  DIAGNOSIS  Your health care provider will perform a physical exam. During  your exam, your health care provider may perform any of the following to help determine if you have acute sinusitis or chronic sinusitis:  · Look in your nose for signs of abnormal growths in your nostrils (nasal polyps).  · Tap over the affected sinus to check for signs of infection.  · View the inside of your sinuses using an imaging device that has a light attached (endoscope).  If your health care provider suspects that you have chronic sinusitis, one or more of the following tests may be recommended:  · Allergy tests.  · Nasal culture. A sample of mucus is taken from your nose, sent to a lab, and screened for bacteria.  · Nasal cytology. A sample of mucus is taken from your nose and examined by your health care provider to determine if your sinusitis is related to an allergy.  TREATMENT  Most cases of acute sinusitis are related to a viral infection and will resolve on their own within 10 days. Sometimes, medicines are prescribed to help relieve symptoms of both acute and chronic sinusitis. These may include pain medicines, decongestants, nasal steroid sprays, or saline sprays.  However, for sinusitis related to a bacterial infection, your health care provider will prescribe antibiotic medicines. These are medicines that will help kill the bacteria causing the infection.  Rarely, sinusitis is caused by a fungal infection. In these cases, your health care provider will prescribe antifungal medicine.  For some cases of chronic sinusitis, surgery is needed. Generally, these are cases in which sinusitis recurs more than 3 times per year, despite other treatments.  HOME CARE INSTRUCTIONS  · Drink plenty of water. Water helps thin the mucus so your sinuses can drain more easily.  · Use a humidifier.  · Inhale steam 3-4 times a day (for example, sit in the bathroom with the shower running).  · Apply a warm, moist washcloth to your face 3-4 times a day, or as directed by your health care provider.  · Use saline nasal  sprays to help moisten and clean your sinuses.  · Take medicines only as directed by your health care provider.  · If you were prescribed either an antibiotic or antifungal medicine, finish it all even if you start to feel better.  SEEK IMMEDIATE MEDICAL CARE IF:  · You have increasing pain or severe headaches.  · You have nausea, vomiting, or drowsiness.  · You have swelling around your face.  · You have vision problems.  · You have a stiff neck.  · You have difficulty breathing.     This information is not intended to replace advice given to you by your health care provider. Make sure you discuss any questions you have with your health care provider.     Document Released: 12/18/2006 Document Revised: 01/08/2016 Document Reviewed: 01/01/2013  [employer] Interactive Patient Education ©2016 [employer]            Patient Information     Patient Information    Following emergency treatment: all patient requiring follow-up care must return either to a private physician or a clinic if your condition worsens before you are able to obtain further medical attention, please return to the emergency room.     Billing Information    At [facility], we work to make the billing process streamlined for our patients.  Our Representatives are here to answer any questions you may have regarding your hospital bill.  If you have insurance coverage and have supplied your insurance information to us, we will submit a claim to your insurer on your behalf.  Should you have any questions regarding your bill, we can be reached online or by phone as follows:  Online: You are able pay your bills online or live chat with our representatives about any billing questions you may have. We are here to help Monday - Friday from 8:00am to 7:30pm and 9:00am - 12:00pm on Saturdays.  Please visit [email or web address]  for more information.   Phone:  [phone] or [phone]    Please note that your emergency  physician, surgeon, pathologist, radiologist, anesthesiologist, and other specialists are not employed by [facility] and will therefore bill separately for their services.  Please contact them directly for any questions concerning their bills at the numbers below:     Emergency Physician Services:  [phone]  [facility]:  [phone]  [facility]:  [phone]  [facility]:  [phone]    1. Your final bill may vary from the amount quoted upon discharge if all procedures are not complete at that time, or if your doctor has additional procedures of which we are not aware. You will receive an additional bill if you return to the Emergency Department at [facility] for suture removal regardless of the facility of which the sutures were placed.     2. Please arrange for settlement of this account at the emergency registration.    3. All self-pay accounts are due in full at the time of treatment.  If you are unable to meet this obligation then payment is expected within 4-5 days.     4. If you have had radiology studies (CT, X-ray, Ultrasound, MRI), you have received a preliminary result during your emergency department visit. Please contact the radiology department [phone] to receive a copy of your final result. Please discuss the Final result with your primary physician or with the follow up physician provided.     Crisis Hotline:  [place] Crisis Hotline:  7-530-DARPBSD or 1-988.254.2536  [place] Crisis Hotline:    1-270.326.9817 or 168-764-2667         ED Discharge Follow Up Questions    1. In order to provide you with very good care, we would like to follow up with a phone call in the next few days.  May we have your permission to contact you?     YES /  NO    2. What is the best phone number to call you? (       )_____-__________    3. What is the best time to call you?      Morning  /  Afternoon  /  Evening                   Patient Signature:   ____________________________________________________________    Date:  ____________________________________________________________      Your appointments     Oct 26, 2017  8:00 AM   ANNUAL EXAM PREVENTATIVE with Joann Wakefield M.D.   [facility] [address] [place] [address] ([facility])    [address] [address]   [phone]

## 2017-06-08 NOTE — ED AVS SNAPSHOT
6/8/2017    Price Moon  [address] [place] [address]    Dear Price:    [facility] wants to ensure your discharge home is safe and you or your loved ones have had all of your questions answered regarding your care after you leave the hospital.    Below is a list of resources and contact information should you have any questions regarding your hospital stay, follow-up instructions, or active medical symptoms.    Questions or Concerns Regarding… Contact   Medical Questions Related to Your Discharge  (7 days a week, 8am-5pm) Contact a Nurse Care Coordinator   [phone]   Medical Questions Not Related to Your Discharge  (24 hours a day / 7 days a week)  Contact the Nurse Health Line   [phone]    Medications or Discharge Instructions Refer to your discharge packet   or contact your [facility] Primary Care Provider   [phone]   Follow-up Appointment(s) Schedule your appointment via DailyTicket   or contact Scheduling [phone]   Billing Review your statement via DailyTicket  or contact Billing [phone]   Medical Records Review your records via DailyTicket   or contact Medical Records [phone]     You may receive a telephone call within two days of discharge. This call is to make certain you understand your discharge instructions and have the opportunity to have any questions answered. You can also easily access your medical information, test results and upcoming appointments via the DailyTicket free online health management tool. You can learn more and sign up at [email or web address]. For assistance setting up your DailyTicket account, please call [phone].    Once again, we want to ensure your discharge home is safe and that you have a clear understanding of any next steps in your care. If you have any questions or concerns, please do not hesitate to contact us, we are here for you. Thank you for choosing [facility] for your healthcare needs.    Sincerely,    Your [facility] Healthcare Team

## 2017-06-08 NOTE — ED AVS SNAPSHOT
CrowdSystems Access Code: Activation code not generated  Current CrowdSystems Status: Active    Canestahart  A secure, online tool to manage your health information     [employer]’s CrowdSystems® is a secure, online tool that connects you to your personalized health information from the privacy of your home -- day or night - making it very easy for you to manage your healthcare. Once the activation process is completed, you can even access your medical information using the CrowdSystems deejay, which is available for free in the Apple Deejay store or Google Play store.     CrowdSystems provides the following levels of access (as shown below):   My Chart Features   [facility] Primary Care Doctor [facility]  Specialists [facility]  Urgent  Care Non-[facility]  Primary Care  Doctor   Email your healthcare team securely and privately 24/7 X X X X   Manage appointments: schedule your next appointment; view details of past/upcoming appointments X      Request prescription refills. X      View recent personal medical records, including lab and immunizations X X X X   View health record, including health history, allergies, medications X X X X   Read reports about your outpatient visits, procedures, consult and ER notes X X X X   See your discharge summary, which is a recap of your hospital and/or ER visit that includes your diagnosis, lab results, and care plan. X X       How to register for CrowdSystems:  1. Go to  [email or web address].  2. Click on the Sign Up Now box, which takes you to the New Member Sign Up page. You will need to provide the following information:  a. Enter your CrowdSystems Access Code exactly as it appears at the top of this page. (You will not need to use this code after you’ve completed the sign-up process. If you do not sign up before the expiration date, you must request a new code.)   b. Enter your date of birth.   c. Enter your home email address.   d. Click Submit, and follow the next screen’s instructions.  3. Create a CrowdSystems ID. This will  be your SailPlay login ID and cannot be changed, so think of one that is secure and easy to remember.  4. Create a SailPlay password. You can change your password at any time.  5. Enter your Password Reset Question and Answer. This can be used at a later time if you forget your password.   6. Enter your e-mail address. This allows you to receive e-mail notifications when new information is available in SailPlay.  7. Click Sign Up. You can now view your health information.    For assistance activating your SailPlay account, call [phone]

## 2017-06-09 ENCOUNTER — TELEPHONE (OUTPATIENT)
Dept: MEDICAL GROUP | Age: 72
End: 2017-06-09

## 2017-06-09 NOTE — ED PROVIDER NOTES
ED Provider Note    CHIEF COMPLAINT  Chief Complaint   Patient presents with   • Cough     x 4 weeks, recent treatment for Bronchitis   • Head Ache       HPI  Price Moon is a 71 y.o. male who presents to the emergency for with 4-5 weeks of persistent cough. He explains that early on he had productive cough with green-yellow sputum although this is significantly reduced to near 0 productive nature. However he does continue with a dry hacking cough. He says he has significant nasal and ear pressure and congestion. He has been using over-the-counter Robitussin cough syrup as well as codeine cough syrup at night. This is helping minimally. He did take a 7-10 day course of antibiotics and has not been completed for possibly one week. He does not believe that this helped significant only either. He has additionally been using an albuterol inhaler which is not helping. Lastly he has been using steroids last couple days which is also not helping his symptoms. His son which is an ER doctor in [place] directed him to the ER [facility] for further evaluation. He is outside of his nasal congestion and persistent cough he has no other significant symptoms or changes from his baseline. No chest pain or shortness of breath. No abdominal or back pain. No leg pain or swelling. No recent travel. He did get his flu shot as well as his pneumonia shot this year.    REVIEW OF SYSTEMS  See HPI for further details. All other systems are negative.     PAST MEDICAL HISTORY   has a past medical history of Thyroid disease; Hyperlipidemia; and BPH (benign prostatic hyperplasia).    SOCIAL HISTORY  Social History     Social History Main Topics   • Smoking status: Former Smoker     Types: Cigarettes     Quit date: 01/20/1985   • Smokeless tobacco: Never Used   • Alcohol Use: No   • Drug Use: No   • Sexual Activity:     Partners: Female       SURGICAL HISTORY   has past surgical history that includes hernia repair and eye surgery.    CURRENT  "MEDICATIONS  Home Medications     **Home medications have not yet been reviewed for this encounter**          ALLERGIES  No Known Allergies    PHYSICAL EXAM  VITAL SIGNS: /82 mmHg  Pulse 61  Temp(Src) 36.5 °C (97.7 °F)  Resp 18  Ht 1.778 m (5' 10\")  Wt 117.9 kg (259 lb 14.8 oz)  BMI 37.29 kg/m2  SpO2 92% @DIANA[[id number]::@   Pulse ox interpretation: I interpret this pulse ox as normal.  Constitutional: Alert in no apparent distress.  HENT: No signs of trauma, Bilateral external ears normal, Nose normal.   Eyes: Pupils are equal and reactive, Conjunctiva normal, Non-icteric.   Neck: Normal range of motion, No tenderness, Supple, No stridor.   Lymphatic: No lymphadenopathy noted.   Cardiovascular: Regular rate and rhythm, no murmurs.   Thorax & Lungs: Normal breath sounds, No respiratory distress, No wheezing, No chest tenderness.   Abdomen: Bowel sounds normal, Soft, No tenderness, No masses, No pulsatile masses. No peritoneal signs.  Skin: Warm, Dry, No erythema, No rash.  Back: No bony tenderness, No CVA tenderness.   Extremities: Intact distal pulses, No edema, No tenderness, No cyanosis,  Negative Lenora's sign.   Musculoskeletal: Good range of motion in all major joints. No tenderness to palpation or major deformities noted.   Neurologic: Alert , Normal motor function, Normal sensory function, No focal deficits noted.   Psychiatric: Affect normal, Judgment normal, Mood normal.       DIAGNOSTIC STUDIES / PROCEDURES    EKG  1934: Sinus rhythm at a rate of 55, normal axis, normal intervals, no acute ST changes.    LABS  Results for orders placed or performed during the hospital encounter of 06/08/17   CBC w/ Differential   Result Value Ref Range    WBC 7.5 4.8 - 10.8 K/uL    RBC 4.76 4.70 - 6.10 M/uL    Hemoglobin 14.4 14.0 - 18.0 g/dL    Hematocrit 42.6 42.0 - 52.0 %    MCV 89.5 81.4 - 97.8 fL    MCH 30.3 27.0 - 33.0 pg    MCHC 33.8 33.7 - 35.3 g/dL    RDW 45.1 35.9 - 50.0 fL    Platelet Count 203 164 - " 446 K/uL    MPV 9.7 9.0 - 12.9 fL    Neutrophils-Polys 71.60 44.00 - 72.00 %    Lymphocytes 21.90 (L) 22.00 - 41.00 %    Monocytes 5.60 0.00 - 13.40 %    Eosinophils 0.10 0.00 - 6.90 %    Basophils 0.30 0.00 - 1.80 %    Immature Granulocytes 0.50 0.00 - 0.90 %    Nucleated RBC 0.00 /100 WBC    Neutrophils (Absolute) 5.37 1.82 - 7.42 K/uL    Lymphs (Absolute) 1.64 1.00 - 4.80 K/uL    Monos (Absolute) 0.42 0.00 - 0.85 K/uL    Eos (Absolute) 0.01 0.00 - 0.51 K/uL    Baso (Absolute) 0.02 0.00 - 0.12 K/uL    Immature Granulocytes (abs) 0.04 0.00 - 0.11 K/uL    NRBC (Absolute) 0.00 K/uL   Basic Metabolic Panel (BMP)   Result Value Ref Range    Sodium 139 135 - 145 mmol/L    Potassium 4.2 3.6 - 5.5 mmol/L    Chloride 106 96 - 112 mmol/L    Co2 25 20 - 33 mmol/L    Glucose 135 (H) 65 - 99 mg/dL    Bun 23 (H) 8 - 22 mg/dL    Creatinine 1.07 0.50 - 1.40 mg/dL    Calcium 8.6 8.4 - 10.2 mg/dL    Anion Gap 8.0 0.0 - 11.9   Btype Natriuretic Peptide   Result Value Ref Range    B Natriuretic Peptide 46 0 - 100 pg/mL   ESTIMATED GFR   Result Value Ref Range    GFR If African American >60 >60 mL/min/1.73 m 2    GFR If Non African American >60 >60 mL/min/1.73 m 2   EKG (NOW)   Result Value Ref Range    Report       [facility] Emergency Dept.    Test Date:  2017  Pt Name:    DEWAYNE VILLEGAS                  Department: [facility]  MRN:        [id number]                      Room:       -ROOM 1  Gender:     M                            Technician: KEV  :        1945                   Requested By:ANANYA GRIMES  Order #:    [id number]                    Reading MD:    Measurements  Intervals                                Axis  Rate:       55                           P:  LA:                                      QRS:        1  QRSD:       102                          T:          44  QT:         436  QTc:        417    Interpretive Statements  JUNCTIONAL ESCAPE RHYTHM  NONSPECIFIC T ABNORMALITIES, LATERAL  LEADS  No previous ECG available for comparison           RADIOLOGY  DX-CHEST-2 VIEWS   Final Result      1.  COPD.   2.  Left lung scarring or atelectasis.   3.  6 mm nodular density projecting over the right costophrenic angle which could represent a small pulmonary nodule, nipple shadow or other extrinsic structure.              COURSE & MEDICAL DECISION MAKING  Pertinent Labs & Imaging studies reviewed. (See chart for details)  Patient presented to the emergency department for complaint of persistent cough and nasal congestion. He states that his symptoms and have been ongoing for 4-5 weeks. His son is a ER physician in [place] and has been directing a great deal of his care. Patient is currently on multitude of medications and has completed recent course of antibiotics with new antibiotic being prescribed a few days ago although he has not yet started this. He is using Flonase, Tessalon, codeine cough syrup, steroids, Excedrin. There is no significant abnormalities on today's evaluation including labs and imaging. Of note there was an incidental possible pulmonary nodule. This has been discussed with patient and he will follow up on this incidental finding. I do not find any significant findings consistent with is otherwise likely viral URI/sinusitis at this point. I have encouraged him to continue with his current medication regimen as well as using additional over-the-counter medications as discussed. I have also discussed that given the persistence of cough that he may have a nonproductive cough secondary to GERD at this point. I have recommended that he may consider trying over-the-counter reflux medications and follow a diet therein to see if this does not irna his current symptoms. Begin this plan as her suspicion for more viral URI/sinusitis etiology. He is followed with his primary care physician for reevaluation ongoing care. He is return percussion the ER should his symptoms change or  worsen.      The patient will not drink alcohol nor drive with prescribed medications. The patient will return for worsening symptoms and is stable at the time of discharge. The patient verbalizes understanding and will comply.    FINAL IMPRESSION  1. Viral URI with cough    2. Pulmonary nodule            Electronically signed by: Librado Neville, 6/8/2017 7:28 PM

## 2017-06-09 NOTE — TELEPHONE ENCOUNTER
Phone Number Called: [phone] (home)     Message: Patient informed of referral placed.    Left Message for patient to call back: no

## 2017-06-09 NOTE — ED NOTES
"Chief Complaint   Patient presents with   • Cough     x 4 weeks, recent treatment for Bronchitis   • Head Ache     /86 mmHg  Pulse 61  Temp(Src) 36.5 °C (97.7 °F)  Resp 18  Ht 1.778 m (5' 10\")  Wt 117.9 kg (259 lb 14.8 oz)  BMI 37.29 kg/m2  SpO2 92%    "

## 2017-06-10 NOTE — TELEPHONE ENCOUNTER
Please inform patient that his recent chest x ray in ER showed that he has possible lung nodule which needs to do further evaluation. Please advise patient to return to clinic to discuss further investigation.    Thanks!    Joann Wakefield M.D.

## 2017-06-12 NOTE — TELEPHONE ENCOUNTER
Phone Number Called: [phone] (home)      Message: Pt notified of Dr. Wakefield's message. Appointment scheduled for 06/14/17 at 1:20 pm    Left Message for patient to call back: no

## 2017-06-14 ENCOUNTER — OFFICE VISIT (OUTPATIENT)
Dept: MEDICAL GROUP | Age: 72
End: 2017-06-14
Payer: MEDICARE

## 2017-06-14 VITALS
HEART RATE: 91 BPM | DIASTOLIC BLOOD PRESSURE: 70 MMHG | HEIGHT: 70 IN | WEIGHT: 252 LBS | SYSTOLIC BLOOD PRESSURE: 120 MMHG | OXYGEN SATURATION: 97 % | BODY MASS INDEX: 36.08 KG/M2 | TEMPERATURE: 98.2 F

## 2017-06-14 DIAGNOSIS — R91.1 LUNG NODULE: ICD-10-CM

## 2017-06-14 DIAGNOSIS — R09.81 CONGESTION OF PARANASAL SINUS: ICD-10-CM

## 2017-06-14 DIAGNOSIS — E03.4 HYPOTHYROIDISM DUE TO ACQUIRED ATROPHY OF THYROID: ICD-10-CM

## 2017-06-14 DIAGNOSIS — E78.5 HYPERLIPIDEMIA LDL GOAL <100: ICD-10-CM

## 2017-06-14 PROCEDURE — 99214 OFFICE O/P EST MOD 30 MIN: CPT | Performed by: INTERNAL MEDICINE

## 2017-06-14 ASSESSMENT — PAIN SCALES - GENERAL: PAINLEVEL: NO PAIN

## 2017-06-15 NOTE — PROGRESS NOTES
Subjective:   Dewayne Moon is a 71 y.o. male here today for evaluation and management of:      Lung nodule  The patient had chest x-ray on 6/8/17 in ER as he went to ER for evaluation of cough and sinus congestion. CXR on 6/8/17 found that he has COPD changes, 6 mm nodule on right lung that can be lung nodule or nipple shadow. Patient stated that he has history of short course of smoking for about 2 years and quit 40 years ago. He denied wheezing or shortness of breath at regular base. He has those symptoms a few weeks ago when he had cold and stated that cough, wheezing resolved.   We discussed to have CT chest for further evaluation in 6 months. Patient agreed with the plan.     Hypothyroidism due to acquired atrophy of thyroid  Patient is taking levothyroxine 100 µg every morning. He is in euthyroid state and denies side effects from taking levothyroxine.    Results for DEWAYNE MOON (MRN [id number]) as of 6/14/2017 17:43   Ref. Range 4/8/2017 08:33   TSH Latest Ref Range: 0.300-3.700 uIU/mL 1.970   Free T-4 Latest Ref Range: 0.53-1.43 ng/dL 0.82       Hyperlipidemia LDL goal <100  Patient is taking Lipitor 10 mg every evening and red yeast rice daily. He recently stopped taking red yeast rice and other over-the-counter supplement as he was sick from upper airway infection. He denies side effects from taking Lipitor and red yeast rice. His cholesterol is well controlled with current regimens.    Congestion of paranasal sinus  Patient completed 2 courses of antibiotic, 5 days of prednisone. His cough improved. He still has sinus congestion, radiated to both ears, more on the right ear. He has audiology test recently and will follow with ENT in July. He does not have ear pain or ear discharge. He noticed decreased hearing on the right side due to congestion. He is using Flonase nasal spray.           Current medicines (including changes today)  Current Outpatient Prescriptions   Medication Sig Dispense Refill   •  "fluticasone (FLONASE) 50 MCG/ACT nasal spray Spray 2 Sprays in nose every day. Each Nostril 16 g 3   • guaifenesin-codeine (ROBITUSSIN AC) Solution oral solution Take 5 mL by mouth every 6 hours as needed for Cough. 150 mL 0   • albuterol 108 (90 BASE) MCG/ACT Aero Soln inhalation aerosol Inhale 2 Puffs by mouth every 6 hours as needed for Shortness of Breath. 8.5 g 0   • Red Yeast Rice Extract (RED YEAST RICE PO) Take  by mouth.     • Misc Natural Products (PROSTATE HEALTH PO) Take  by mouth.     • triamcinolone acetonide (KENALOG) 0.1 % Cream Apply  to affected area(s) 2 times a day.     • atorvastatin (LIPITOR) 20 MG Tab Take 1 Tab by mouth every bedtime. 90 Tab 3   • levothyroxine (SYNTHROID) 100 MCG Tab Take 1 Tab by mouth Every morning on an empty stomach. 90 Tab 3   • bimatoprost (LUMIGAN) 0.01 % Solution Place 1 Drop in both eyes every bedtime.       No current facility-administered medications for this visit.     He  has a past medical history of Thyroid disease; Hyperlipidemia; and BPH (benign prostatic hyperplasia).    ROS   No chest pain, no shortness of breath, no abdominal pain       Objective:     Blood pressure 120/70, pulse 91, temperature 36.8 °C (98.2 °F), height 1.778 m (5' 10\"), weight 114.306 kg (252 lb), SpO2 97 %. Body mass index is 36.16 kg/(m^2).     Physical Exam:  General: Alert, oriented and no acute distress.  Eye contact is good, speech goal directed, affect calm  HEENT: conjunctiva non-injected, sclera non-icteric.  Oral mucous membranes pink and moist with no lesions.  Pinna normal. TM pearly gray. Serous effusion behind bilateral tympanic membranes. No swelling or discharge from both ear cavities. No cerumen impaction.  Neck No supraclavicular, submandibular, submental lymphadenopathy or masses in the neck or supraclavicular regions.  Lungs: Normal respiratory effort, clear to auscultation bilaterally with good excursion.  CV: regular rate and rhythm. No murmurs.   Abdomen: soft, " non distended, nontender, Bowel sound normal.  Ext: no edema, color normal, vascularity normal, temperature normal        Assessment and Plan:   The following treatment plan was discussed     1. Hyperlipidemia LDL goal <100  - Well-controlled. Continue current regimens. Recheck lab 1-2 weeks before next follow up visit.  - COMP METABOLIC PANEL; Future  - LIPID PROFILE; Future    2. Lung nodule  - Asymptomatic. Discussed with patient off chest x-ray finding. Patient agreed to do CT chest in 6 months for follow-up. He is advised to do fasting blood tests before CT chest in December 2017.  - CBC WITH DIFFERENTIAL; Future  - COMP METABOLIC PANEL; Future  - CT-CHEST (THORAX) WITH; Future    3. Hypothyroidism due to acquired atrophy of thyroid  - Well-controlled. Continue current regimens. Recheck lab 1-2 weeks before next follow up visit.  - CBC WITH DIFFERENTIAL; Future  - TSH; Future  - FREE THYROXINE; Future    4. Congestion of paranasal sinus  - Discussed at length to rinse sinuses with over the counter nasal wash or Netti pot once or twice a day and then use flonase nasal spray once or twice a day after rinsing flonase nasal spray.  - Advised to try nasal steam therapy.   - Given acute moderate- severe paranasal congestion and just completed prednisone treatment, I advised patient to try claritin-D for 5 days. Review potential side effects of Claritin-D with patient. Advised to stop taking Claritin D if he has any side effects from taking claritin D.         Followup: Return in about 6 months (around 12/14/2017), or if symptoms worsen or fail to improve, for hypothyroid, hyperlipidemia, lung nodule, lab review.      Please note that this dictation was created using voice recognition software. I have made every reasonable attempt to correct obvious errors, but I expect that there may have unintended errors in text, spelling, punctuation, or grammar that I did not discover.

## 2017-06-15 NOTE — ASSESSMENT & PLAN NOTE
Patient completed 2 courses of antibiotic, 5 days of prednisone. His cough improved. He still has sinus congestion, radiated to both ears, more on the right ear. He has audiology test recently and will follow with ENT in July. He does not have ear pain or ear discharge. He noticed decreased hearing on the right side due to congestion. He is using Flonase nasal spray.

## 2017-06-15 NOTE — ASSESSMENT & PLAN NOTE
Patient is taking levothyroxine 100 µg every morning. He is in euthyroid state and denies side effects from taking levothyroxine.    Results for DEWAYNE VILLEGAS (MRN [id number]) as of 6/14/2017 17:43   Ref. Range 4/8/2017 08:33   TSH Latest Ref Range: 0.300-3.700 uIU/mL 1.970   Free T-4 Latest Ref Range: 0.53-1.43 ng/dL 0.82

## 2017-06-15 NOTE — ASSESSMENT & PLAN NOTE
The patient had chest x-ray on 6/8/17 in ER as he went to ER for evaluation of cough and sinus congestion. CXR on 6/8/17 found that he has COPD changes, 6 mm nodule on right lung that can be lung nodule or nipple shadow. Patient stated that he has history of short course of smoking for about 2 years and quit 40 years ago. He denied wheezing or shortness of breath at regular base. He has those symptoms a few weeks ago when he had cold and stated that cough, wheezing resolved.   We discussed to have CT chest for further evaluation in 6 months. Patient agreed with the plan.

## 2017-06-15 NOTE — ASSESSMENT & PLAN NOTE
Patient is taking Lipitor 10 mg every evening and red yeast rice daily. He recently stopped taking red yeast rice and other over-the-counter supplement as he was sick from upper airway infection. He denies side effects from taking Lipitor and red yeast rice. His cholesterol is well controlled with current regimens.

## 2017-06-20 ENCOUNTER — PATIENT OUTREACH (OUTPATIENT)
Dept: HEALTH INFORMATION MANAGEMENT | Facility: OTHER | Age: 72
End: 2017-06-20

## 2017-06-20 NOTE — PROGRESS NOTES
Attempt #:1    WebIZ Checked & Epic Updated: no  HealthConnect Verified: no  Verify PCP: yes    Communication Preference Obtained: yes     Review Care Team: yes    Annual Wellness Visit Scheduling  1. Scheduling Status:Not Scheduled. Patient states they are not interested     CCare Gap Scheduling (Attempt to Schedule EACH Overdue Care Gap!)     Health Maintenance Due   Topic Date Due   • Annual Wellness Visit  declined         [employer] Activation: already active  [employer] Deejay: no  Virtual Visits: no  Opt In to Text Messages: no

## 2017-08-10 ENCOUNTER — OFFICE VISIT (OUTPATIENT)
Dept: URGENT CARE | Facility: CLINIC | Age: 72
End: 2017-08-10
Payer: MEDICARE

## 2017-08-10 VITALS
RESPIRATION RATE: 20 BRPM | DIASTOLIC BLOOD PRESSURE: 70 MMHG | BODY MASS INDEX: 39.03 KG/M2 | OXYGEN SATURATION: 94 % | WEIGHT: 272 LBS | TEMPERATURE: 98 F | HEART RATE: 62 BPM | SYSTOLIC BLOOD PRESSURE: 112 MMHG

## 2017-08-10 DIAGNOSIS — S90.851A ACUTE FOREIGN BODY OF RIGHT HEEL, INITIAL ENCOUNTER: ICD-10-CM

## 2017-08-10 PROCEDURE — 10120 INC&RMVL FB SUBQ TISS SMPL: CPT | Performed by: PHYSICIAN ASSISTANT

## 2017-08-10 RX ORDER — CEPHALEXIN 500 MG/1
500 CAPSULE ORAL 3 TIMES DAILY
Qty: 15 CAP | Refills: 0 | Status: SHIPPED | OUTPATIENT
Start: 2017-08-10 | End: 2017-08-15

## 2017-08-10 ASSESSMENT — ENCOUNTER SYMPTOMS
VOMITING: 0
WEAKNESS: 0
SENSORY CHANGE: 0
FOCAL WEAKNESS: 0
NUMBNESS: 0
CHILLS: 0
NAUSEA: 0
FEVER: 0
TINGLING: 0

## 2017-08-10 NOTE — MR AVS SNAPSHOT
Price Moon   8/10/2017 9:45 AM   Office Visit   MRN: [id number]    Department:  [facility]   Dept Phone:  [phone]    Description:  Male : 1945   Provider:  Itzel Bassett PA-C           Reason for Visit     Foot Problem Couple days ago thorn stab right foot.      Allergies as of 8/10/2017     No Known Allergies      You were diagnosed with     Acute foreign body of right heel, initial encounter   [[id number]]         Vital Signs     Blood Pressure Pulse Temperature Respirations Weight Oxygen Saturation    112/70 mmHg 62 36.7 °C (98 °F) 20 123.378 kg (272 lb) 94%    Smoking Status                   Former Smoker           Basic Information     Date Of Birth Sex Race Ethnicity Preferred Language    1945 Male White Non- English      Your appointments     Oct 26, 2017  8:00 AM   ANNUAL EXAM PREVENTATIVE with Joann Wakefield M.D.   [address])    [address] [place] [id number] [phone]            Dec 28, 2017  8:20 AM   Established Patient with Joann Wakefield M.D.   [place] [address] [facility])    [address] [id number] [phone]           You will be receiving a confirmation call a few days before your appointment from our automated call confirmation system.              Problem List              ICD-10-CM Priority Class Noted - Resolved    Eye problem H57.9   2016 - Present    BPH (benign prostatic hyperplasia) N40.0   2016 - Present    Hyperlipidemia LDL goal <100 E78.5   2016 - Present    Preventative health care Z00.00   2016 - Present    Hypothyroidism due to acquired atrophy of thyroid E03.4   2016 - Present    Bradycardia with 51-60 beats per minute R00.1   10/7/2016 - Present    Obesity (BMI 35.0-39.9 without comorbidity) (HCC) E66.9   2017 - Present    Rash R21   2017 - Present    Neck pain, bilateral M54.2   2017 - Present    Lung nodule R91.1    6/14/2017 - Present    Congestion of paranasal sinus R09.81   6/14/2017 - Present      Health Maintenance        Date Due Completion Dates    IMM DTaP/Tdap/Td Vaccine (1 - Tdap) 2/28/2023 (Originally 2/28/2013) 2/27/2013    IMM INFLUENZA (1) 9/1/2017 10/7/2016, 11/10/2014    COLONOSCOPY 7/30/2024 7/30/2014 (Prv Comp)    Override on 7/30/2014: Previously completed            Current Immunizations     13-VALENT PCV PREVNAR 1/20/2017    Influenza Vaccine Adult HD 10/7/2016  9:04 AM    Influenza Vaccine Quad Inj (Pf) 11/10/2014    Pneumococcal polysaccharide vaccine (PPSV-23) 5/24/2012    SHINGLES VACCINE 8/8/2015    TD Vaccine 2/27/2013      Below and/or attached are the medications your provider expects you to take. Review all of your home medications and newly ordered medications with your provider and/or pharmacist. Follow medication instructions as directed by your provider and/or pharmacist. Please keep your medication list with you and share with your provider. Update the information when medications are discontinued, doses are changed, or new medications (including over-the-counter products) are added; and carry medication information at all times in the event of emergency situations     Allergies:  No Known Allergies          Medications  Valid as of: August 10, 2017 - 10:43 AM    Generic Name Brand Name Tablet Size Instructions for use    Albuterol Sulfate (Aero Soln) albuterol 108 (90 BASE) MCG/ACT Inhale 2 Puffs by mouth every 6 hours as needed for Shortness of Breath.        Atorvastatin Calcium (Tab) LIPITOR 20 MG Take 1 Tab by mouth every bedtime.        Bimatoprost (Solution) LUMIGAN 0.01 % Place 1 Drop in both eyes every bedtime.        Cephalexin (Cap) KEFLEX 500 MG Take 1 Cap by mouth 3 times a day for 5 days.        Fluticasone Propionate (Suspension) FLONASE 50 MCG/ACT Spray 2 Sprays in nose every day. Each Nostril        Guaifenesin-Codeine (Solution) ROBITUSSIN -10 mg/5mL Take 5 mL by mouth  every 6 hours as needed for Cough.        Levothyroxine Sodium (Tab) SYNTHROID 100 MCG Take 1 Tab by mouth Every morning on an empty stomach.        Misc Natural Products   Take  by mouth.        Red Yeast Rice Extract   Take  by mouth.        Triamcinolone Acetonide (Cream) KENALOG 0.1 % Apply  to affected area(s) 2 times a day.        .                 Medicines prescribed today were sent to:     [facility][id number] - [place], [place] - [address]    [address] [place] [address]    Phone: [phone] Fax: [phone]    Open 24 Hours?: No    [facility] [place], [place] - [address] AT PORTAL TO REGISTERED [facility] SITES    [address] [place] [address]    Phone: [phone] Fax: [phone]    Open 24 Hours?: No      Medication refill instructions:       If your prescription bottle indicates you have medication refills left, it is not necessary to call your provider’s office. Please contact your pharmacy and they will refill your medication.    If your prescription bottle indicates you do not have any refills left, you may request refills at any time through one of the following ways: The online [employer] system (except Urgent Care), by calling your provider’s office, or by asking your pharmacy to contact your provider’s office with a refill request. Medication refills are processed only during regular business hours and may not be available until the next business day. Your provider may request additional information or to have a follow-up visit with you prior to refilling your medication.   *Please Note: Medication refills are assigned a new Rx number when refilled electronically. Your pharmacy may indicate that no refills were authorized even though a new prescription for the same medication is available at the pharmacy. Please request the medicine by name with the pharmacy before contacting your provider for a refill.           [employer] Access Code: Activation code not  generated  Current MyChart Status: Active

## 2017-08-10 NOTE — PROGRESS NOTES
Subjective:      Price Moon is a 72 y.o. male who presents with Foot Problem            Foot Problem  This is a new problem. Episode onset: 5-6 days. Patient reports stepping on a thorn about 5-6 days ago.  He continues to have FB sensation in right  heel  The problem occurs constantly. The problem has been unchanged. Pertinent negatives include no chills, fever, nausea, numbness, vomiting or weakness. Associated symptoms comments: Right heel pain . Exacerbated by: bearing weight  Treatments tried: patient tried removing thorn himself without success.   TDAP is UTD    Past Medical History   Diagnosis Date   • Thyroid disease    • Hyperlipidemia    • BPH (benign prostatic hyperplasia)      Past Surgical History   Procedure Laterality Date   • Hernia repair     • Eye surgery         Family History   Problem Relation Age of Onset   • Hypertension Mother    • Other Father      ALS   • Hypertension Brother        No Known Allergies    Medications, Allergies, and current problem list reviewed today in Epic      Review of Systems   Constitutional: Negative for fever and chills.   Gastrointestinal: Negative for nausea and vomiting.   Musculoskeletal: Negative for joint pain.   Skin:        Thorn in right heel   Neurological: Negative for tingling, sensory change, focal weakness, weakness and numbness.     All other systems reviewed and are negative.        Objective:     /70 mmHg  Pulse 62  Temp(Src) 36.7 °C (98 °F)  Resp 20  Wt 123.378 kg (272 lb)  SpO2 94%     Physical Exam   Constitutional: He is oriented to person, place, and time. He appears well-developed and well-nourished. No distress.   HENT:   Head: Normocephalic and atraumatic.   Eyes: Conjunctivae are normal.   Pulmonary/Chest: Effort normal. No respiratory distress.   Neurological: He is alert and oriented to person, place, and time. No cranial nerve deficit.   Skin:   Right heel- small foreign body/splinter noted in right heel after digging with  forceps. No surrounding skin erythema or edema. No purulent drainage or pus   Psychiatric: He has a normal mood and affect. His behavior is normal. Judgment and thought content normal.     Procedure: Foreign body remova  -Risks including bleeding, nerve damage, infection, and poor cosmetic outcome discussed at length. Benefits and alternatives discussed.   -Sterile technique throughout. Wound edges prepped with Betadine.   -Local anesthesia with 2 % lidocaine  - Forceps used to take out small parts of splinter. No residual splinter remained but possibly deeper  -Wound irrigated with copious amounts of saline.   -Polysporin and dressing placed  -Patient tolerated well          Assessment/Plan:     1. Acute foreign body of right heel, initial encounter  - full vs partial removal of splinter.  - explained to the patient that I did not see residual splinter but advised epsom salt soaks 3 times daily to promote ejection of any residual splinter.  - cephALEXin (KEFLEX) 500 MG Cap; Take 1 Cap by mouth 3 times a day for 5 days.  Dispense: 15 Cap; Refill: 0  - wound care discussed.     Differential diagnoses, Supportive care, and indications for immediate follow-up discussed with patient.   Instructed to return to clinic or nearest emergency department for any change in condition, further concerns, or worsening of symptoms.    The patient demonstrated a good understanding and agreed with the treatment plan.    Itzel Bassett PA-C

## 2017-08-15 DIAGNOSIS — E03.4 HYPOTHYROIDISM DUE TO ACQUIRED ATROPHY OF THYROID: ICD-10-CM

## 2017-08-15 RX ORDER — LEVOTHYROXINE SODIUM 0.1 MG/1
100 TABLET ORAL
Qty: 90 TAB | Refills: 3 | Status: SHIPPED | OUTPATIENT
Start: 2017-08-15 | End: 2018-09-30 | Stop reason: SDUPTHER

## 2017-09-21 ENCOUNTER — RX ONLY (OUTPATIENT)
Age: 72
Setting detail: RX ONLY
End: 2017-09-21

## 2017-10-05 PROBLEM — D49.2 NEOPLASM OF UNSPECIFIED BEHAVIOR OF BONE, SOFT TISSUE, AND SKIN: Status: RESOLVED | Noted: 2017-09-21 | Resolved: 2017-10-05

## 2017-10-17 ENCOUNTER — HOSPITAL ENCOUNTER (OUTPATIENT)
Dept: LAB | Facility: MEDICAL CENTER | Age: 72
End: 2017-10-17
Attending: INTERNAL MEDICINE
Payer: MEDICARE

## 2017-10-17 DIAGNOSIS — R91.1 LUNG NODULE: ICD-10-CM

## 2017-10-17 DIAGNOSIS — E03.4 HYPOTHYROIDISM DUE TO ACQUIRED ATROPHY OF THYROID: ICD-10-CM

## 2017-10-17 DIAGNOSIS — E78.5 HYPERLIPIDEMIA LDL GOAL <100: ICD-10-CM

## 2017-10-17 LAB
ALBUMIN SERPL BCP-MCNC: 3.4 G/DL (ref 3.2–4.9)
ALBUMIN/GLOB SERPL: 1 G/DL
ALP SERPL-CCNC: 80 U/L (ref 30–99)
ALT SERPL-CCNC: 22 U/L (ref 2–50)
ANION GAP SERPL CALC-SCNC: 7 MMOL/L (ref 0–11.9)
AST SERPL-CCNC: 22 U/L (ref 12–45)
BASOPHILS # BLD AUTO: 0.8 % (ref 0–1.8)
BASOPHILS # BLD: 0.04 K/UL (ref 0–0.12)
BILIRUB SERPL-MCNC: 0.7 MG/DL (ref 0.1–1.5)
BUN SERPL-MCNC: 17 MG/DL (ref 8–22)
CALCIUM SERPL-MCNC: 9.2 MG/DL (ref 8.5–10.5)
CHLORIDE SERPL-SCNC: 105 MMOL/L (ref 96–112)
CHOLEST SERPL-MCNC: 114 MG/DL (ref 100–199)
CO2 SERPL-SCNC: 28 MMOL/L (ref 20–33)
CREAT SERPL-MCNC: 0.96 MG/DL (ref 0.5–1.4)
EOSINOPHIL # BLD AUTO: 0.07 K/UL (ref 0–0.51)
EOSINOPHIL NFR BLD: 1.4 % (ref 0–6.9)
ERYTHROCYTE [DISTWIDTH] IN BLOOD BY AUTOMATED COUNT: 42.3 FL (ref 35.9–50)
GFR SERPL CREATININE-BSD FRML MDRD: >60 ML/MIN/1.73 M 2
GLOBULIN SER CALC-MCNC: 3.4 G/DL (ref 1.9–3.5)
GLUCOSE SERPL-MCNC: 80 MG/DL (ref 65–99)
HCT VFR BLD AUTO: 49.3 % (ref 42–52)
HDLC SERPL-MCNC: 40 MG/DL
HGB BLD-MCNC: 16.3 G/DL (ref 14–18)
IMM GRANULOCYTES # BLD AUTO: 0.01 K/UL (ref 0–0.11)
IMM GRANULOCYTES NFR BLD AUTO: 0.2 % (ref 0–0.9)
LDLC SERPL CALC-MCNC: 47 MG/DL
LYMPHOCYTES # BLD AUTO: 1.66 K/UL (ref 1–4.8)
LYMPHOCYTES NFR BLD: 33.3 % (ref 22–41)
MCH RBC QN AUTO: 30 PG (ref 27–33)
MCHC RBC AUTO-ENTMCNC: 33.1 G/DL (ref 33.7–35.3)
MCV RBC AUTO: 90.6 FL (ref 81.4–97.8)
MONOCYTES # BLD AUTO: 0.42 K/UL (ref 0–0.85)
MONOCYTES NFR BLD AUTO: 8.4 % (ref 0–13.4)
NEUTROPHILS # BLD AUTO: 2.79 K/UL (ref 1.82–7.42)
NEUTROPHILS NFR BLD: 55.9 % (ref 44–72)
NRBC # BLD AUTO: 0 K/UL
NRBC BLD AUTO-RTO: 0 /100 WBC
PLATELET # BLD AUTO: 176 K/UL (ref 164–446)
PMV BLD AUTO: 10.1 FL (ref 9–12.9)
POTASSIUM SERPL-SCNC: 4.4 MMOL/L (ref 3.6–5.5)
PROT SERPL-MCNC: 6.8 G/DL (ref 6–8.2)
RBC # BLD AUTO: 5.44 M/UL (ref 4.7–6.1)
SODIUM SERPL-SCNC: 140 MMOL/L (ref 135–145)
T4 FREE SERPL-MCNC: 0.96 NG/DL (ref 0.53–1.43)
TRIGL SERPL-MCNC: 135 MG/DL (ref 0–149)
TSH SERPL DL<=0.005 MIU/L-ACNC: 2.27 UIU/ML (ref 0.3–3.7)
WBC # BLD AUTO: 5 K/UL (ref 4.8–10.8)

## 2017-10-17 PROCEDURE — 84443 ASSAY THYROID STIM HORMONE: CPT

## 2017-10-17 PROCEDURE — 36415 COLL VENOUS BLD VENIPUNCTURE: CPT

## 2017-10-17 PROCEDURE — 85025 COMPLETE CBC W/AUTO DIFF WBC: CPT

## 2017-10-17 PROCEDURE — 84439 ASSAY OF FREE THYROXINE: CPT

## 2017-10-17 PROCEDURE — 80053 COMPREHEN METABOLIC PANEL: CPT

## 2017-10-17 PROCEDURE — 80061 LIPID PANEL: CPT

## 2017-10-25 ENCOUNTER — TELEPHONE (OUTPATIENT)
Dept: MEDICAL GROUP | Age: 72
End: 2017-10-25

## 2017-10-25 NOTE — ASSESSMENT & PLAN NOTE
Patient is taking levothyroxine 100 µg every morning. He denies side effects from taking it. He is in euthyroid state. Recent thyroid function tests within normal.    Results for DEWAYNE VILLEGAS (MRN [id number]) as of 10/25/2017 08:03   Ref. Range 10/17/2017 09:41   TSH Latest Ref Range: 0.300 - 3.700 uIU/mL 2.270   Free T-4 Latest Ref Range: 0.53 - 1.43 ng/dL 0.96

## 2017-10-25 NOTE — ASSESSMENT & PLAN NOTE
Patient does not take pressure medication for BPH. He tried over-the-counter prostate health supplement. He reported having weak urine stream. His symptom is well controlled with supplement.

## 2017-10-25 NOTE — ASSESSMENT & PLAN NOTE
Patient has chest x-ray on 6/8/17 in ER for cough and sinus congestion. She states reassured that he has COPD, 6 mm nodule on right lung that can be lung nodule or nipple shadow. Patient has short course of smoking history for 2 years and quit smoking 40 years ago. He needs to repeat CT chest in 6 months for further evaluation, which will be due in December 2017. CT chest was ordered on 6/14/17.

## 2017-10-25 NOTE — ASSESSMENT & PLAN NOTE
He is taking Lipitor 10 mg every evening, and red yeast rice daily. His cholesterol is well controlled with current regimens. He denies side effects from taking Lipitor and red yeast rice.      Results for DEWAYNE VILLEGAS (MRN [id number]) as of 10/25/2017 08:03   Ref. Range 10/17/2017 09:41   Cholesterol,Tot Latest Ref Range: 100 - 199 mg/dL 114   Triglycerides Latest Ref Range: 0 - 149 mg/dL 135   HDL Latest Ref Range: >=40 mg/dL 40   LDL Latest Ref Range: <100 mg/dL 47

## 2017-10-26 ENCOUNTER — HOSPITAL ENCOUNTER (OUTPATIENT)
Dept: RADIOLOGY | Facility: MEDICAL CENTER | Age: 72
End: 2017-10-26
Attending: INTERNAL MEDICINE
Payer: MEDICARE

## 2017-10-26 ENCOUNTER — OFFICE VISIT (OUTPATIENT)
Dept: MEDICAL GROUP | Age: 72
End: 2017-10-26
Payer: MEDICARE

## 2017-10-26 VITALS
TEMPERATURE: 98.4 F | HEIGHT: 70 IN | DIASTOLIC BLOOD PRESSURE: 72 MMHG | OXYGEN SATURATION: 95 % | HEART RATE: 65 BPM | WEIGHT: 264 LBS | BODY MASS INDEX: 37.8 KG/M2 | SYSTOLIC BLOOD PRESSURE: 122 MMHG

## 2017-10-26 DIAGNOSIS — R91.1 LUNG NODULE: ICD-10-CM

## 2017-10-26 DIAGNOSIS — N40.1 BENIGN PROSTATIC HYPERPLASIA WITH LOWER URINARY TRACT SYMPTOMS, SYMPTOM DETAILS UNSPECIFIED: ICD-10-CM

## 2017-10-26 DIAGNOSIS — Z00.00 MEDICARE ANNUAL WELLNESS VISIT, INITIAL: ICD-10-CM

## 2017-10-26 DIAGNOSIS — R09.81 CONGESTION OF PARANASAL SINUS: ICD-10-CM

## 2017-10-26 DIAGNOSIS — E78.5 HYPERLIPIDEMIA LDL GOAL <100: ICD-10-CM

## 2017-10-26 DIAGNOSIS — Z11.59 NEED FOR HEPATITIS C SCREENING TEST: ICD-10-CM

## 2017-10-26 DIAGNOSIS — E03.4 HYPOTHYROIDISM DUE TO ACQUIRED ATROPHY OF THYROID: ICD-10-CM

## 2017-10-26 DIAGNOSIS — H57.9 EYE PROBLEM: ICD-10-CM

## 2017-10-26 DIAGNOSIS — R00.1 BRADYCARDIA WITH 51-60 BEATS PER MINUTE: ICD-10-CM

## 2017-10-26 DIAGNOSIS — M54.2 NECK PAIN, BILATERAL: ICD-10-CM

## 2017-10-26 DIAGNOSIS — E66.9 OBESITY (BMI 35.0-39.9 WITHOUT COMORBIDITY): ICD-10-CM

## 2017-10-26 DIAGNOSIS — Z12.5 SCREENING FOR PROSTATE CANCER: ICD-10-CM

## 2017-10-26 PROBLEM — R21 RASH: Status: RESOLVED | Noted: 2017-01-20 | Resolved: 2017-10-26

## 2017-10-26 PROCEDURE — G0439 PPPS, SUBSEQ VISIT: HCPCS | Performed by: INTERNAL MEDICINE

## 2017-10-26 PROCEDURE — 700117 HCHG RX CONTRAST REV CODE 255: Performed by: INTERNAL MEDICINE

## 2017-10-26 PROCEDURE — 71260 CT THORAX DX C+: CPT

## 2017-10-26 RX ADMIN — IOHEXOL 80 ML: 350 INJECTION, SOLUTION INTRAVENOUS at 14:28

## 2017-10-26 ASSESSMENT — PATIENT HEALTH QUESTIONNAIRE - PHQ9: CLINICAL INTERPRETATION OF PHQ2 SCORE: 0

## 2017-10-26 NOTE — TELEPHONE ENCOUNTER
ANNUAL WELLNESS VISIT PRE-VISIT PLANNING     1.  Reviewed note from last office visit with PCP: YES    2.  If any orders were placed at last visit, do we have Results/Consult Notes?        •  Labs - Labs ordered, completed on 10/17 and results are in chart.   Note: If patient appointment is for lab review and patient did not complete labs,                check with provider if OK to reschedule patient until labs completed.       •  Imaging - Imaging ordered, NOT completed. Patient advised to complete prior to next appointment.       •  Referrals - Referral ordered, patient has NOT been seen.    3.  Immunizations were updated in [email or web address] using [employer]?: Yes       •  [employer] Recommendations: FLU and TDAP       •  Is patient due for Tdap? YES. Patient was not notified of copay/out of pocket cost.       •  Is patient due for Shingles? NO     4.  Patient is due for the following Health Maintenance Topics:   Health Maintenance Due   Topic Date Due   • Annual Wellness Visit  1945   • IMM INFLUENZA (1) 09/01/2017       - Patient is up-to-date on all Health Maintenance topics. No records have been requested at this time.    5.  Reviewed/Updated the following with patient:       •   Preferred Pharmacy? YES       •   Preferred Lab? YES       •   Preferred Communication? YES       •   Allergies? YES       •   Medications? YES. Was Abstract Encounter opened and chart updated? YES       •   Social History? YES. Was Abstract Encounter opened and chart updated? YES       •   Family History (document living status of immediate family members and if + hx of cancer, diabetes, hypertension, hyperlipidemia, heart attack, stroke) YES. Was Abstract Encounter opened and chart updated? YES    6.  Care Team Updated:       •   DME Company (gait device, O2, CPAP, etc.): YES       •   Other Specialists (eye doctor, derm, GYN, cardiology, endo, etc): NO    7.  Patient has the following Care Path diagnoses on Problem List:  NONE    8.  Specialty  Comments was updated with diagnosis information provided by SCP: YES    9.  Patient was advised: “This is a free wellness visit. The provider will screen for medical conditions to help you stay healthy. If you have other concerns to address you may be asked to discuss these at a separate visit or there may be an additional fee.”     6.  Patient was informed to arrive 15 min prior to their scheduled appointment and bring in their medication bottles.

## 2017-10-26 NOTE — PROGRESS NOTES
Chief Complaint   Patient presents with   • Annual Wellness Visit              HPI:  Price is a 72 y.o. here for Medicare Annual Wellness Visit        Patient Active Problem List    Diagnosis Date Noted   • Lung nodule 06/14/2017   • Congestion of paranasal sinus 06/14/2017   • Neck pain, bilateral 04/20/2017   • Obesity (BMI 35.0-39.9 without comorbidity) 01/20/2017   • Bradycardia with 51-60 beats per minute 10/07/2016   • Hypothyroidism due to acquired atrophy of thyroid 01/31/2016   • Eye problem 01/20/2016   • BPH (benign prostatic hyperplasia) 01/20/2016   • Hyperlipidemia LDL goal <100 01/20/2016   • Preventative health care 01/20/2016       Current Outpatient Prescriptions   Medication Sig Dispense Refill   • levothyroxine (SYNTHROID) 100 MCG Tab Take 1 Tab by mouth Every morning on an empty stomach. 90 Tab 3   • fluticasone (FLONASE) 50 MCG/ACT nasal spray Spray 2 Sprays in nose every day. Each Nostril 16 g 3   • triamcinolone acetonide (KENALOG) 0.1 % Cream Apply  to affected area(s) 2 times a day.     • atorvastatin (LIPITOR) 20 MG Tab Take 1 Tab by mouth every bedtime. 90 Tab 3   • bimatoprost (LUMIGAN) 0.01 % Solution Place 1 Drop in both eyes every bedtime.       No current facility-administered medications for this visit.         Patient is taking medications as noted in medication list.  Current supplements as per medication list.     Allergies: Review of patient's allergies indicates no known allergies.    Current social contact/activities: walking/swimming     Is patient current with immunizations? No, due for FLU. Patient is interested in receiving we do not have needles to give flu shot today.    He  reports that he quit smoking about 32 years ago. His smoking use included Cigarettes. He has never used smokeless tobacco. He reports that he does not drink alcohol or use drugs.  Counseling given: Yes        DPA/Advanced directive: Patient has Advanced Directive on file.     ROS:    Gait: Uses no  assistive device   Ostomy: no   Other tubes: no   Amputations: no   Chronic oxygen use no   Last eye exam 10/2017   Wears hearing aids: no   : Denies incontinence.       Screening:        Depression Screening    Little interest or pleasure in doing things?  0 - not at all  Feeling down, depressed, or hopeless? 0 - not at all  Patient Health Questionnaire Score: 0    If depressive symptoms identified deferred to follow up visit unless specifically addressed in assessment and plan.    Interpretation of PHQ-9 Total Score   Score Severity   1-4 No Depression   5-9 Mild Depression   10-14 Moderate Depression   15-19 Moderately Severe Depression   20-27 Severe Depression    Screening for Cognitive Impairment    Three Minute Recall (apple, watch, guillaume)  2/3 Banana/sunrise/fence  2/3  Draw clock face with all 12 numbers set to the hand to show 10 minutes past 11 o'clock  1 5/5  If cognitive concerns identified, deferred for follow up unless specifically addressed in assessment and plan.    Fall Risk Assessment    Has the patient had two or more falls in the last year or any fall with injury in the last year?  No  If fall risk identified, deferred for follow up unless specifically addressed in assessment and plan.    Safety Assessment    Throw rugs on floor.  Yes  Handrails on all stairs.  No  Good lighting in all hallways.  Yes  Difficulty hearing.  Yes  Patient counseled about all safety risks that were identified.    Functional Assessment ADLs    Are there any barriers preventing you from cooking for yourself or meeting nutritional needs?  No.    Are there any barriers preventing you from driving safely or obtaining transportation?  No.    Are there any barriers preventing you from using a telephone or calling for help?  No.    Are there any barriers preventing you from shopping?  No.    Are there any barriers preventing you from taking care of your own finances?  No.    Are there any barriers preventing you from  "managing your medications?  No.    Are you currently engaging any exercise or physical activity?  Yes.  Martial arts/swim 2 times daily/walk/ride bike    Health Maintenance Summary                Annual Wellness Visit Overdue 1945     IMM INFLUENZA Overdue 9/1/2017      Done 10/7/2016 Imm Admin: Influenza Vaccine Adult HD     Patient has more history with this topic...    IMM DTaP/Tdap/Td Vaccine Postponed 2/28/2023 Originally 2/28/2013. Other     Done 2/27/2013 Imm Admin: TD Vaccine    COLONOSCOPY Next Due 7/30/2024      Previously completed 7/30/2014           Patient Care Team:  Joann Wakefield M.D. as PCP - General (Internal Medicine)  Rosalva Kelly M.D. as Consulting Physician (Ophthalmology)  Verónica Oviedo M.D. as Consulting Physician (Otolaryngology)    Social History   Substance Use Topics   • Smoking status: Former Smoker     Types: Cigarettes     Quit date: 1/20/1985   • Smokeless tobacco: Never Used   • Alcohol use No     Family History   Problem Relation Age of Onset   • Hypertension Mother    • Other Father      ALS   • Hypertension Brother      He  has a past medical history of BPH (benign prostatic hyperplasia); Hyperlipidemia; and Thyroid disease.   Past Surgical History:   Procedure Laterality Date   • EYE SURGERY     • HERNIA REPAIR             Exam:     Blood pressure 122/72, pulse 65, temperature 36.9 °C (98.4 °F), height 1.778 m (5' 10\"), weight 119.7 kg (264 lb), SpO2 95 %. Body mass index is 37.88 kg/m².    Hearing good.    Dentition good  Alert, oriented in no acute distress.  Eye contact is good, speech goal directed, affect calm      Assessment and Plan. The following treatment and monitoring plan is recommended:    1. Medicare annual wellness visit, initial  Initial Wellness Visit - Includes PPPS ()   2. Lung nodule  Initial Wellness Visit - Includes PPPS ()    Patient has CT Lungs scheduled today. We will follow-up on CT scan. He is asymptomatic currently.   3. " Hypothyroidism due to acquired atrophy of thyroid  TSH    FREE THYROXINE    Initial Wellness Visit - Includes PPPS ()    Well-controlled. Continue levothyroxine 100 µg every morning. Recheck labs in 6 months.   4. Hyperlipidemia LDL goal <100  COMP METABOLIC PANEL    LIPID PROFILE    Initial Wellness Visit - Includes PPPS ()    Well-controlled. Continue atorvastatin 20 mg every evening. Reviewed potential side effects of atorvastatin with patient. Recheck lab in 6 months.  Advised to eat low fat, low carbohydrate and high fiber diet as well as do cardio physical exercise regularly.    5. Benign prostatic hyperplasia with lower urinary tract symptoms, symptom details unspecified  Initial Wellness Visit - Includes PPPS ()    Symptom is well controlled. Patient does not want to take medication currently. Continue to monitor.   6. Eye problem  Initial Wellness Visit - Includes PPPS ()    Patient follows with ophthalmologist regularly. He has possible glaucoma on right eye. He is using Lumigan eyedrop as prescribed by ophthalmologist. Symptom is stable with current regimen    7. Congestion of paranasal sinus  Initial Wellness Visit - Includes PPPS ()    Improved sinus congestion. Patient still has fullness on the right ear. We discussed to rinse sinuses with nasal wash daily and use Flonase nasal spray once a day.   8. Bradycardia with 51-60 beats per minute  Initial Wellness Visit - Includes PPPS ()    Asymptomatic. The heart rate in clinic was 65 today. He denied chest pain, skipped heartbeat, shortness of breath, fatigue or tired. Patient does not want to do intervention. Continue to monitor.   9. Neck pain, bilateral  Initial Wellness Visit - Includes PPPS ()    Improved. Continue to do gentle stretching exercises.   10. Obesity (BMI 35.0-39.9 without comorbidity)  Patient identified as having weight management issue.  Appropriate orders and counseling given.    Initial Wellness  Visit - Includes PPPS ()    Counseled for healthy diet and regular physical exercise to lose weight.    11. Need for hepatitis C screening test  HEP C VIRUS ANTIBODY    Initial Wellness Visit - Includes PPPS ()    Ordered hepatitis C antibody for Screening.   12. Screening for prostate cancer  PROSTATE SPECIFIC AG SCREENING    Initial Wellness Visit - Includes PPPS ()    Discussed pros and cons of PSA test as well as sensitivity and specificity of the test with patient. He would like to have early detection and request to order it.         Services suggested: No services needed at this time  Health Care Screening recommendations as per orders if indicated.  Referrals offered: PT/OT/Nutrition counseling/Behavioral Health/Smoking cessation as per orders if indicated.    Discussion today about general wellness and lifestyle habits:    · Prevent falls and reduce trip hazards; Cautioned about securing or removing rugs.  · Have a working fire alarm and carbon monoxide detector;   · Engage in regular physical activity and social activities       Follow-up: Return in about 6 months (around 4/26/2018), or if symptoms worsen or fail to improve, for hyperlipidemia, hypothyroid, bradycardia, BPH, lung nodule, lab review.

## 2018-03-01 ENCOUNTER — APPOINTMENT (RX ONLY)
Dept: URBAN - METROPOLITAN AREA CLINIC 4 | Facility: CLINIC | Age: 73
Setting detail: DERMATOLOGY
End: 2018-03-01

## 2018-03-01 DIAGNOSIS — L57.0 ACTINIC KERATOSIS: ICD-10-CM

## 2018-03-01 DIAGNOSIS — L82.1 OTHER SEBORRHEIC KERATOSIS: ICD-10-CM

## 2018-03-01 DIAGNOSIS — L20.89 OTHER ATOPIC DERMATITIS: ICD-10-CM

## 2018-03-01 PROBLEM — D48.5 NEOPLASM OF UNCERTAIN BEHAVIOR OF SKIN: Status: ACTIVE | Noted: 2018-03-01

## 2018-03-01 PROBLEM — L20.84 INTRINSIC (ALLERGIC) ECZEMA: Status: ACTIVE | Noted: 2018-03-01

## 2018-03-01 PROCEDURE — 11100: CPT | Mod: 59

## 2018-03-01 PROCEDURE — 17000 DESTRUCT PREMALG LESION: CPT

## 2018-03-01 PROCEDURE — ? PRESCRIPTION

## 2018-03-01 PROCEDURE — 99213 OFFICE O/P EST LOW 20 MIN: CPT | Mod: 25

## 2018-03-01 PROCEDURE — ? COUNSELING

## 2018-03-01 PROCEDURE — 17003 DESTRUCT PREMALG LES 2-14: CPT

## 2018-03-01 PROCEDURE — ? BIOPSY BY SHAVE METHOD

## 2018-03-01 PROCEDURE — ? LIQUID NITROGEN

## 2018-03-01 RX ORDER — TRIAMCINOLONE ACETONIDE 1 MG/G
CREAM TOPICAL BID
Qty: 1 | Refills: 3 | Status: ERX | COMMUNITY
Start: 2018-03-01

## 2018-03-01 RX ADMIN — TRIAMCINOLONE ACETONIDE: 1 CREAM TOPICAL at 00:00

## 2018-03-01 ASSESSMENT — LOCATION DETAILED DESCRIPTION DERM
LOCATION DETAILED: LEFT SCAPHA
LOCATION DETAILED: LEFT TRIANGULAR FOSSA
LOCATION DETAILED: LEFT LATERAL EYEBROW
LOCATION DETAILED: RIGHT LATERAL FRONTAL SCALP
LOCATION DETAILED: RIGHT ANTERIOR PROXIMAL THIGH
LOCATION DETAILED: LEFT INFERIOR HELIX
LOCATION DETAILED: LEFT SUPERIOR CRUS OF ANTIHELIX
LOCATION DETAILED: RIGHT MEDIAL FRONTAL SCALP
LOCATION DETAILED: RIGHT PROXIMAL PRETIBIAL REGION

## 2018-03-01 ASSESSMENT — LOCATION SIMPLE DESCRIPTION DERM
LOCATION SIMPLE: RIGHT PRETIBIAL REGION
LOCATION SIMPLE: LEFT EAR
LOCATION SIMPLE: LEFT EYEBROW
LOCATION SIMPLE: RIGHT THIGH
LOCATION SIMPLE: RIGHT SCALP

## 2018-03-01 ASSESSMENT — LOCATION ZONE DERM
LOCATION ZONE: LEG
LOCATION ZONE: SCALP
LOCATION ZONE: EAR
LOCATION ZONE: FACE

## 2018-03-01 NOTE — PROCEDURE: BIOPSY BY SHAVE METHOD
Wound Care: Petrolatum
Notification Instructions: Patient will be notified of biopsy results. However, patient instructed to call the office if not contacted within 2 weeks.
Electrodesiccation Text: The wound bed was treated with electrodesiccation after the biopsy was performed.
Electrodesiccation And Curettage Text: The wound bed was treated with electrodesiccation and curettage after the biopsy was performed.
Cryotherapy Text: The wound bed was treated with cryotherapy after the biopsy was performed.
Billing Type: Third-Party Bill
Curettage Text: The wound bed was treated with curettage after the biopsy was performed.
Biopsy Method: Dermablade
Render Post-Care Instructions In Note?: no
Anesthesia Type: 1% lidocaine with epinephrine
Hemostasis: Drysol
Detail Level: Detailed
Size Of Lesion In Cm: 1
Type Of Destruction Used: Curettage
Post-Care Instructions: I reviewed with the patient in detail post-care instructions. Patient is to keep the biopsy site dry overnight, and then apply bacitracin twice daily until healed. Patient may apply hydrogen peroxide soaks to remove any crusting.
Anesthesia Volume In Cc: 0.5
Additional Anesthesia Volume In Cc (Will Not Render If 0): 0
Biopsy Type: H and E
Consent: Written consent was obtained and risks were reviewed including but not limited to scarring, infection, bleeding, scabbing, incomplete removal, nerve damage and allergy to anesthesia.
Dressing: bandage
Silver Nitrate Text: The wound bed was treated with silver nitrate after the biopsy was performed.

## 2018-03-02 ENCOUNTER — APPOINTMENT (RX ONLY)
Dept: URBAN - METROPOLITAN AREA CLINIC 4 | Facility: CLINIC | Age: 73
Setting detail: DERMATOLOGY
End: 2018-03-02

## 2018-03-02 PROBLEM — D48.5 NEOPLASM OF UNCERTAIN BEHAVIOR OF SKIN: Status: ACTIVE | Noted: 2018-03-02

## 2018-03-02 PROCEDURE — 11100: CPT | Mod: 79

## 2018-03-02 PROCEDURE — ? BIOPSY BY SHAVE METHOD

## 2018-03-02 NOTE — PROCEDURE: BIOPSY BY SHAVE METHOD
Electrodesiccation Text: The wound bed was treated with electrodesiccation after the biopsy was performed.
Wound Care: Vaseline
Detail Level: Detailed
Bill For Surgical Tray: no
Lab: [id number]
Dressing: bandage
Curettage Text: The wound bed was treated with curettage after the biopsy was performed.
Silver Nitrate Text: The wound bed was treated with silver nitrate after the biopsy was performed.
Additional Anesthesia Volume In Cc (Will Not Render If 0): 0
Type Of Destruction Used: Electrodesiccation
Cryotherapy Text: The wound bed was treated with cryotherapy after the biopsy was performed.
Hemostasis: Electrocautery
Anesthesia Volume In Cc: 0.5
Notification Instructions: Patient will be notified of biopsy results. However, patient instructed to call the office if not contacted within 2 weeks.
Anesthesia Type: 1% lidocaine with epinephrine
Lab Facility: 
Post-Care Instructions: I reviewed with the patient in detail post-care instructions. Patient is to keep the biopsy site dry overnight, and then apply bacitracin twice daily until healed. Patient may apply hydrogen peroxide soaks to remove any crusting.
Billing Type: Third-Party Bill
Biopsy Type: H and E
Biopsy Method: Dermablade
Consent: Written consent was obtained and risks were reviewed including but not limited to scarring, infection, bleeding, scabbing, incomplete removal, nerve damage and allergy to anesthesia.
Electrodesiccation And Curettage Text: The wound bed was treated with electrodesiccation and curettage after the biopsy was performed.

## 2018-03-29 ENCOUNTER — APPOINTMENT (RX ONLY)
Dept: URBAN - METROPOLITAN AREA CLINIC 4 | Facility: CLINIC | Age: 73
Setting detail: DERMATOLOGY
End: 2018-03-29

## 2018-03-29 PROBLEM — C44.722 SQUAMOUS CELL CARCINOMA OF SKIN OF RIGHT LOWER LIMB, INCLUDING HIP: Status: ACTIVE | Noted: 2018-03-29

## 2018-03-29 PROCEDURE — 11602 EXC TR-EXT MAL+MARG 1.1-2 CM: CPT

## 2018-03-29 PROCEDURE — ? EXCISION

## 2018-03-29 NOTE — PROCEDURE: EXCISION
Complex Repair And Skin Substitute Graft Text: The defect edges were debeveled with a #15 scalpel blade.  The primary defect was closed partially with a complex linear closure.  Given the location of the remaining defect, shape of the defect and the proximity to free margins a skin substitute graft was deemed most appropriate to repair the remaining defect.  The graft was trimmed to fit the size of the remaining defect.  The graft was then placed in the primary defect, oriented appropriately, and sutured into place.
Anesthesia Type: 1% lidocaine with epinephrine
Secondary Defect Width (In Cm): 0
Epidermal Closure: simple interrupted
Purse String (Intermediate) Text: Given the location of the defect and the characteristics of the surrounding skin a purse string intermediate closure was deemed most appropriate.  Undermining was performed circumfirentially around the surgical defect.  A purse string suture was then placed and tightened.
Melolabial Interpolation Flap Text: A decision was made to reconstruct the defect utilizing an interpolation axial flap and a staged reconstruction.  A telfa template was made of the defect.  This telfa template was then used to outline the melolabial interpolation flap.  The donor area for the pedicle flap was then injected with anesthesia.  The flap was excised through the skin and subcutaneous tissue down to the layer of the underlying musculature.  The pedicle flap was carefully excised within this deep plane to maintain its blood supply.  The edges of the donor site were undermined.   The donor site was closed in a primary fashion.  The pedicle was then rotated into position and sutured.  Once the tube was sutured into place, adequate blood supply was confirmed with blanching and refill.  The pedicle was then wrapped with xeroform gauze and dressed appropriately with a telfa and gauze bandage to ensure continued blood supply and protect the attached pedicle.
Fusiform Excision Additional Text (Leave Blank If You Do Not Want): The margin was drawn around the clinically apparent lesion.  A fusiform shape was then drawn on the skin incorporating the lesion and margins.  Incisions were then made along these lines to the appropriate tissue plane and the lesion was extirpated.
Excision Depth: adipose tissue
Mastoid Interpolation Flap Text: A decision was made to reconstruct the defect utilizing an interpolation axial flap and a staged reconstruction.  A telfa template was made of the defect.  This telfa template was then used to outline the mastoid interpolation flap.  The donor area for the pedicle flap was then injected with anesthesia.  The flap was excised through the skin and subcutaneous tissue down to the layer of the underlying musculature.  The pedicle flap was carefully excised within this deep plane to maintain its blood supply.  The edges of the donor site were undermined.   The donor site was closed in a primary fashion.  The pedicle was then rotated into position and sutured.  Once the tube was sutured into place, adequate blood supply was confirmed with blanching and refill.  The pedicle was then wrapped with xeroform gauze and dressed appropriately with a telfa and gauze bandage to ensure continued blood supply and protect the attached pedicle.
Complex Repair Preamble Text (Leave Blank If You Do Not Want): Extensive wide undermining was performed.
Paramedian Forehead Flap Text: A decision was made to reconstruct the defect utilizing an interpolation axial flap and a staged reconstruction.  A telfa template was made of the defect.  This telfa template was then used to outline the paramedian forehead pedicle flap.  The donor area for the pedicle flap was then injected with anesthesia.  The flap was excised through the skin and subcutaneous tissue down to the layer of the underlying musculature.  The pedicle flap was carefully excised within this deep plane to maintain its blood supply.  The edges of the donor site were undermined.   The donor site was closed in a primary fashion.  The pedicle was then rotated into position and sutured.  Once the tube was sutured into place, adequate blood supply was confirmed with blanching and refill.  The pedicle was then wrapped with xeroform gauze and dressed appropriately with a telfa and gauze bandage to ensure continued blood supply and protect the attached pedicle.
Complex Repair And W Plasty Text: The defect edges were debeveled with a #15 scalpel blade.  The primary defect was closed partially with a complex linear closure.  Given the location of the remaining defect, shape of the defect and the proximity to free margins a W plasty was deemed most appropriate for complete closure of the defect.  Using a sterile surgical marker, an appropriate advancement flap was drawn incorporating the defect and placing the expected incisions within the relaxed skin tension lines where possible.    The area thus outlined was incised deep to adipose tissue with a #15 scalpel blade.  The skin margins were undermined to an appropriate distance in all directions utilizing iris scissors.
Complex Repair And O-T Advancement Flap Text: The defect edges were debeveled with a #15 scalpel blade.  The primary defect was closed partially with a complex linear closure.  Given the location of the remaining defect, shape of the defect and the proximity to free margins an O-T advancement flap was deemed most appropriate for complete closure of the defect.  Using a sterile surgical marker, an appropriate advancement flap was drawn incorporating the defect and placing the expected incisions within the relaxed skin tension lines where possible.    The area thus outlined was incised deep to adipose tissue with a #15 scalpel blade.  The skin margins were undermined to an appropriate distance in all directions utilizing iris scissors.
Anesthesia Volume In Cc: 2
Graft Donor Site Will Heal By Secondary Intention: No
Muscle Hinge Flap Text: The defect edges were debeveled with a #15 scalpel blade.  Given the size, depth and location of the defect and the proximity to free margins a muscle hinge flap was deemed most appropriate.  Using a sterile surgical marker, an appropriate hinge flap was drawn incorporating the defect. The area thus outlined was incised with a #15 scalpel blade.  The skin margins were undermined to an appropriate distance in all directions utilizing iris scissors.
Complex Repair And Ftsg Text: The defect edges were debeveled with a #15 scalpel blade.  The primary defect was closed partially with a complex linear closure.  Given the location of the defect, shape of the defect and the proximity to free margins a full thickness skin graft was deemed most appropriate to repair the remaining defect.  The graft was trimmed to fit the size of the remaining defect.  The graft was then placed in the primary defect, oriented appropriately, and sutured into place.
Modified Advancement Flap Text: The defect edges were debeveled with a #15 scalpel blade.  Given the location of the defect, shape of the defect and the proximity to free margins a modified advancement flap was deemed most appropriate.  Using a sterile surgical marker, an appropriate advancement flap was drawn incorporating the defect and placing the expected incisions within the relaxed skin tension lines where possible.    The area thus outlined was incised deep to adipose tissue with a #15 scalpel blade.  The skin margins were undermined to an appropriate distance in all directions utilizing iris scissors.
Complex Repair And O-L Flap Text: The defect edges were debeveled with a #15 scalpel blade.  The primary defect was closed partially with a complex linear closure.  Given the location of the remaining defect, shape of the defect and the proximity to free margins an O-L flap was deemed most appropriate for complete closure of the defect.  Using a sterile surgical marker, an appropriate flap was drawn incorporating the defect and placing the expected incisions within the relaxed skin tension lines where possible.    The area thus outlined was incised deep to adipose tissue with a #15 scalpel blade.  The skin margins were undermined to an appropriate distance in all directions utilizing iris scissors.
Show Curettage Variables: Yes
Graft Donor Site Bandage (Optional-Leave Blank If You Don't Want In Note): Steri-strips and a pressure bandage were applied to the donor site.
Burow's Advancement Flap Text: The defect edges were debeveled with a #15 scalpel blade.  Given the location of the defect and the proximity to free margins a Burow's advancement flap was deemed most appropriate.  Using a sterile surgical marker, the appropriate advancement flap was drawn incorporating the defect and placing the expected incisions within the relaxed skin tension lines where possible.    The area thus outlined was incised deep to adipose tissue with a #15 scalpel blade.  The skin margins were undermined to an appropriate distance in all directions utilizing iris scissors.
Island Pedicle Flap Text: The defect edges were debeveled with a #15 scalpel blade.  Given the location of the defect, shape of the defect and the proximity to free margins an island pedicle advancement flap was deemed most appropriate.  Using a sterile surgical marker, an appropriate advancement flap was drawn incorporating the defect, outlining the appropriate donor tissue and placing the expected incisions within the relaxed skin tension lines where possible.    The area thus outlined was incised deep to adipose tissue with a #15 scalpel blade.  The skin margins were undermined to an appropriate distance in all directions around the primary defect and laterally outward around the island pedicle utilizing iris scissors.  There was minimal undermining beneath the pedicle flap.
Complex Repair And Single Advancement Flap Text: The defect edges were debeveled with a #15 scalpel blade.  The primary defect was closed partially with a complex linear closure.  Given the location of the remaining defect, shape of the defect and the proximity to free margins a single advancement flap was deemed most appropriate for complete closure of the defect.  Using a sterile surgical marker, an appropriate advancement flap was drawn incorporating the defect and placing the expected incisions within the relaxed skin tension lines where possible.    The area thus outlined was incised deep to adipose tissue with a #15 scalpel blade.  The skin margins were undermined to an appropriate distance in all directions utilizing iris scissors.
Complex Repair And Transposition Flap Text: The defect edges were debeveled with a #15 scalpel blade.  The primary defect was closed partially with a complex linear closure.  Given the location of the remaining defect, shape of the defect and the proximity to free margins a transposition flap was deemed most appropriate for complete closure of the defect.  Using a sterile surgical marker, an appropriate advancement flap was drawn incorporating the defect and placing the expected incisions within the relaxed skin tension lines where possible.    The area thus outlined was incised deep to adipose tissue with a #15 scalpel blade.  The skin margins were undermined to an appropriate distance in all directions utilizing iris scissors.
Wound Care: Petrolatum
Purse String (Simple) Text: Given the location of the defect and the characteristics of the surrounding skin a purse string simple closure was deemed most appropriate.  Undermining was performed circumferentially around the surgical defect.  A purse string suture was then placed and tightened.
Curvilinear Excision Additional Text (Leave Blank If You Do Not Want): The margin was drawn around the clinically apparent lesion.  A curvilinear shape was then drawn on the skin incorporating the lesion and margins.  Incisions were then made along these lines to the appropriate tissue plane and the lesion was extirpated.
Size Of Lesion In Cm: 1
Complex Repair And Double M Plasty Text: The defect edges were debeveled with a #15 scalpel blade.  The primary defect was closed partially with a complex linear closure.  Given the location of the remaining defect, shape of the defect and the proximity to free margins a double M plasty was deemed most appropriate for complete closure of the defect.  Using a sterile surgical marker, an appropriate advancement flap was drawn incorporating the defect and placing the expected incisions within the relaxed skin tension lines where possible.    The area thus outlined was incised deep to adipose tissue with a #15 scalpel blade.  The skin margins were undermined to an appropriate distance in all directions utilizing iris scissors.
Saucerization Excision Additional Text (Leave Blank If You Do Not Want): The margin was drawn around the clinically apparent lesion.  Incisions were then made along these lines, in a tangential fashion, to the appropriate tissue plane and the lesion was extirpated.
Path Notes (To The Dermatopathologist): Please check margins.
Epidermal Autograft Text: The defect edges were debeveled with a #15 scalpel blade.  Given the location of the defect, shape of the defect and the proximity to free margins an epidermal autograft was deemed most appropriate.  Using a sterile surgical marker, the primary defect shape was transferred to the donor site. The epidermal graft was then harvested.  The skin graft was then placed in the primary defect and oriented appropriately.
Epidermal Sutures: 4-0 Ethilon
Z Plasty Text: The lesion was extirpated to the level of the fat with a #15 scalpel blade.  Given the location of the defect, shape of the defect and the proximity to free margins a Z-plasty was deemed most appropriate for repair.  Using a sterile surgical marker, the appropriate transposition arms of the Z-plasty were drawn incorporating the defect and placing the expected incisions within the relaxed skin tension lines where possible.    The area thus outlined was incised deep to adipose tissue with a #15 scalpel blade.  The skin margins were undermined to an appropriate distance in all directions utilizing iris scissors.  The opposing transposition arms were then transposed into place in opposite direction and anchored with interrupted buried subcutaneous sutures.
Deep Sutures: 5-0 Vicryl
Skin Substitute Text: The defect edges were debeveled with a #15 scalpel blade.  Given the location of the defect, shape of the defect and the proximity to free margins a skin substitute graft was deemed most appropriate.  The graft material was trimmed to fit the size of the defect. The graft was then placed in the primary defect and oriented appropriately.
Posterior Auricular Interpolation Flap Text: A decision was made to reconstruct the defect utilizing an interpolation axial flap and a staged reconstruction.  A telfa template was made of the defect.  This telfa template was then used to outline the posterior auricular interpolation flap.  The donor area for the pedicle flap was then injected with anesthesia.  The flap was excised through the skin and subcutaneous tissue down to the layer of the underlying musculature.  The pedicle flap was carefully excised within this deep plane to maintain its blood supply.  The edges of the donor site were undermined.   The donor site was closed in a primary fashion.  The pedicle was then rotated into position and sutured.  Once the tube was sutured into place, adequate blood supply was confirmed with blanching and refill.  The pedicle was then wrapped with xeroform gauze and dressed appropriately with a telfa and gauze bandage to ensure continued blood supply and protect the attached pedicle.
Complex Repair And Split-Thickness Skin Graft Text: The defect edges were debeveled with a #15 scalpel blade.  The primary defect was closed partially with a complex linear closure.  Given the location of the defect, shape of the defect and the proximity to free margins a split thickness skin graft was deemed most appropriate to repair the remaining defect.  The graft was trimmed to fit the size of the remaining defect.  The graft was then placed in the primary defect, oriented appropriately, and sutured into place.
Complex Repair And Dermal Autograft Text: The defect edges were debeveled with a #15 scalpel blade.  The primary defect was closed partially with a complex linear closure.  Given the location of the defect, shape of the defect and the proximity to free margins an dermal autograft was deemed most appropriate to repair the remaining defect.  The graft was trimmed to fit the size of the remaining defect.  The graft was then placed in the primary defect, oriented appropriately, and sutured into place.
Lazy S Intermediate Repair Preamble Text (Leave Blank If You Do Not Want): Undermining was performed with blunt dissection.
Additional Anesthesia Volume In Cc: 6
Complex Repair And V-Y Plasty Text: The defect edges were debeveled with a #15 scalpel blade.  The primary defect was closed partially with a complex linear closure.  Given the location of the remaining defect, shape of the defect and the proximity to free margins a V-Y plasty was deemed most appropriate for complete closure of the defect.  Using a sterile surgical marker, an appropriate advancement flap was drawn incorporating the defect and placing the expected incisions within the relaxed skin tension lines where possible.    The area thus outlined was incised deep to adipose tissue with a #15 scalpel blade.  The skin margins were undermined to an appropriate distance in all directions utilizing iris scissors.
Dorsal Nasal Flap Text: The defect edges were debeveled with a #15 scalpel blade.  Given the location of the defect and the proximity to free margins a dorsal nasal flap was deemed most appropriate.  Using a sterile surgical marker, an appropriate dorsal nasal flap was drawn around the defect.    The area thus outlined was incised deep to adipose tissue with a #15 scalpel blade.  The skin margins were undermined to an appropriate distance in all directions utilizing iris scissors.
Melolabial Transposition Flap Text: The defect edges were debeveled with a #15 scalpel blade.  Given the location of the defect and the proximity to free margins a melolabial flap was deemed most appropriate.  Using a sterile surgical marker, an appropriate melolabial transposition flap was drawn incorporating the defect.    The area thus outlined was incised deep to adipose tissue with a #15 scalpel blade.  The skin margins were undermined to an appropriate distance in all directions utilizing iris scissors.
Post-Care Instructions: I reviewed with the patient in detail post-care instructions. Patient is not to engage in any heavy lifting, exercise, or swimming for the next 14 days. Should the patient develop any fevers, chills, bleeding, severe pain patient will contact the office immediately.
Home Suture Removal Text: Patient was provided a home suture removal kit and will remove their sutures at home.  If they have any questions or difficulties they will call the office.
Repair Type: None
Complex Repair And Bilobe Flap Text: The defect edges were debeveled with a #15 scalpel blade.  The primary defect was closed partially with a complex linear closure.  Given the location of the remaining defect, shape of the defect and the proximity to free margins a bilobe flap was deemed most appropriate for complete closure of the defect.  Using a sterile surgical marker, an appropriate advancement flap was drawn incorporating the defect and placing the expected incisions within the relaxed skin tension lines where possible.    The area thus outlined was incised deep to adipose tissue with a #15 scalpel blade.  The skin margins were undermined to an appropriate distance in all directions utilizing iris scissors.
Complex Repair And Epidermal Autograft Text: The defect edges were debeveled with a #15 scalpel blade.  The primary defect was closed partially with a complex linear closure.  Given the location of the defect, shape of the defect and the proximity to free margins an epidermal autograft was deemed most appropriate to repair the remaining defect.  The graft was trimmed to fit the size of the remaining defect.  The graft was then placed in the primary defect, oriented appropriately, and sutured into place.
Interpolation Flap Text: A decision was made to reconstruct the defect utilizing an interpolation axial flap and a staged reconstruction.  A telfa template was made of the defect.  This telfa template was then used to outline the interpolation flap.  The donor area for the pedicle flap was then injected with anesthesia.  The flap was excised through the skin and subcutaneous tissue down to the layer of the underlying musculature.  The interpolation flap was carefully excised within this deep plane to maintain its blood supply.  The edges of the donor site were undermined.   The donor site was closed in a primary fashion.  The pedicle was then rotated into position and sutured.  Once the tube was sutured into place, adequate blood supply was confirmed with blanching and refill.  The pedicle was then wrapped with xeroform gauze and dressed appropriately with a telfa and gauze bandage to ensure continued blood supply and protect the attached pedicle.
V-Y Flap Text: The defect edges were debeveled with a #15 scalpel blade.  Given the location of the defect, shape of the defect and the proximity to free margins a V-Y flap was deemed most appropriate.  Using a sterile surgical marker, an appropriate advancement flap was drawn incorporating the defect and placing the expected incisions within the relaxed skin tension lines where possible.    The area thus outlined was incised deep to adipose tissue with a #15 scalpel blade.  The skin margins were undermined to an appropriate distance in all directions utilizing iris scissors.
Repair Performed By Another Provider Text (Leave Blank If You Do Not Want): After the tissue was excised the defect was repaired by another provider.
Transposition Flap Text: The defect edges were debeveled with a #15 scalpel blade.  Given the location of the defect and the proximity to free margins a transposition flap was deemed most appropriate.  Using a sterile surgical marker, an appropriate transposition flap was drawn incorporating the defect.    The area thus outlined was incised deep to adipose tissue with a #15 scalpel blade.  The skin margins were undermined to an appropriate distance in all directions utilizing iris scissors.
Cheek-To-Nose Interpolation Flap Text: A decision was made to reconstruct the defect utilizing an interpolation axial flap and a staged reconstruction.  A telfa template was made of the defect.  This telfa template was then used to outline the Cheek-To-Nose Interpolation flap.  The donor area for the pedicle flap was then injected with anesthesia.  The flap was excised through the skin and subcutaneous tissue down to the layer of the underlying musculature.  The interpolation flap was carefully excised within this deep plane to maintain its blood supply.  The edges of the donor site were undermined.   The donor site was closed in a primary fashion.  The pedicle was then rotated into position and sutured.  Once the tube was sutured into place, adequate blood supply was confirmed with blanching and refill.  The pedicle was then wrapped with xeroform gauze and dressed appropriately with a telfa and gauze bandage to ensure continued blood supply and protect the attached pedicle.
Split-Thickness Skin Graft Text: The defect edges were debeveled with a #15 scalpel blade.  Given the location of the defect, shape of the defect and the proximity to free margins a split thickness skin graft was deemed most appropriate.  Using a sterile surgical marker, the primary defect shape was transferred to the donor site. The split thickness graft was then harvested.  The skin graft was then placed in the primary defect and oriented appropriately.
O-Z Plasty Text: The defect edges were debeveled with a #15 scalpel blade.  Given the location of the defect, shape of the defect and the proximity to free margins an O-Z plasty (double transposition flap) was deemed most appropriate.  Using a sterile surgical marker, the appropriate transposition flaps were drawn incorporating the defect and placing the expected incisions within the relaxed skin tension lines where possible.    The area thus outlined was incised deep to adipose tissue with a #15 scalpel blade.  The skin margins were undermined to an appropriate distance in all directions utilizing iris scissors.  Hemostasis was achieved with electrocautery.  The flaps were then transposed into place, one clockwise and the other counterclockwise, and anchored with interrupted buried subcutaneous sutures.
Complex Repair And M Plasty Text: The defect edges were debeveled with a #15 scalpel blade.  The primary defect was closed partially with a complex linear closure.  Given the location of the remaining defect, shape of the defect and the proximity to free margins an M plasty was deemed most appropriate for complete closure of the defect.  Using a sterile surgical marker, an appropriate advancement flap was drawn incorporating the defect and placing the expected incisions within the relaxed skin tension lines where possible.    The area thus outlined was incised deep to adipose tissue with a #15 scalpel blade.  The skin margins were undermined to an appropriate distance in all directions utilizing iris scissors.
S Plasty Text: Given the location and shape of the defect, and the orientation of relaxed skin tension lines, an S-plasty was deemed most appropriate for repair.  Using a sterile surgical marker, the appropriate outline of the S-plasty was drawn, incorporating the defect and placing the expected incisions within the relaxed skin tension lines where possible.  The area thus outlined was incised deep to adipose tissue with a #15 scalpel blade.  The skin margins were undermined to an appropriate distance in all directions utilizing iris scissors. The skin flaps were advanced over the defect.  The opposing margins were then approximated with interrupted buried subcutaneous sutures.
Partial Purse String (Simple) Text: Given the location of the defect and the characteristics of the surrounding skin a simple purse string closure was deemed most appropriate.  Undermining was performed circumferentially around the surgical defect.  A purse string suture was then placed and tightened. Wound tension of the circular defect prevented complete closure of the wound.
Complex Repair And Modified Advancement Flap Text: The defect edges were debeveled with a #15 scalpel blade.  The primary defect was closed partially with a complex linear closure.  Given the location of the remaining defect, shape of the defect and the proximity to free margins a modified advancement flap was deemed most appropriate for complete closure of the defect.  Using a sterile surgical marker, an appropriate advancement flap was drawn incorporating the defect and placing the expected incisions within the relaxed skin tension lines where possible.    The area thus outlined was incised deep to adipose tissue with a #15 scalpel blade.  The skin margins were undermined to an appropriate distance in all directions utilizing iris scissors.
A-T Advancement Flap Text: The defect edges were debeveled with a #15 scalpel blade.  Given the location of the defect, shape of the defect and the proximity to free margins an A-T advancement flap was deemed most appropriate.  Using a sterile surgical marker, an appropriate advancement flap was drawn incorporating the defect and placing the expected incisions within the relaxed skin tension lines where possible.    The area thus outlined was incised deep to adipose tissue with a #15 scalpel blade.  The skin margins were undermined to an appropriate distance in all directions utilizing iris scissors.
Crescentic Advancement Flap Text: The defect edges were debeveled with a #15 scalpel blade.  Given the location of the defect and the proximity to free margins a crescentic advancement flap was deemed most appropriate.  Using a sterile surgical marker, the appropriate advancement flap was drawn incorporating the defect and placing the expected incisions within the relaxed skin tension lines where possible.    The area thus outlined was incised deep to adipose tissue with a #15 scalpel blade.  The skin margins were undermined to an appropriate distance in all directions utilizing iris scissors.
Consent was obtained from the patient. The risks and benefits to therapy were discussed in detail. Specifically, the risks of infection, scarring, bleeding, prolonged wound healing, incomplete removal, allergy to anesthesia, nerve injury and recurrence were addressed. Prior to the procedure, the treatment site was clearly identified and confirmed by the patient. All components of Universal Protocol/PAUSE Rule completed.
Hemostasis: Electrocautery
Positioning (Leave Blank If You Do Not Want): The patient was placed in a comfortable position exposing the surgical site.
Advancement-Rotation Flap Text: The defect edges were debeveled with a #15 scalpel blade.  Given the location of the defect, shape of the defect and the proximity to free margins an advancement-rotation flap was deemed most appropriate.  Using a sterile surgical marker, an appropriate flap was drawn incorporating the defect and placing the expected incisions within the relaxed skin tension lines where possible. The area thus outlined was incised deep to adipose tissue with a #15 scalpel blade.  The skin margins were undermined to an appropriate distance in all directions utilizing iris scissors.
Rotation Flap Text: The defect edges were debeveled with a #15 scalpel blade.  Given the location of the defect, shape of the defect and the proximity to free margins a rotation flap was deemed most appropriate.  Using a sterile surgical marker, an appropriate rotation flap was drawn incorporating the defect and placing the expected incisions within the relaxed skin tension lines where possible.    The area thus outlined was incised deep to adipose tissue with a #15 scalpel blade.  The skin margins were undermined to an appropriate distance in all directions utilizing iris scissors.
Dermal Autograft Text: The defect edges were debeveled with a #15 scalpel blade.  Given the location of the defect, shape of the defect and the proximity to free margins a dermal autograft was deemed most appropriate.  Using a sterile surgical marker, the primary defect shape was transferred to the donor site. The area thus outlined was incised deep to adipose tissue with a #15 scalpel blade.  The harvested graft was then trimmed of adipose and epidermal tissue until only dermis was left.  The skin graft was then placed in the primary defect and oriented appropriately.
Double Island Pedicle Flap Text: The defect edges were debeveled with a #15 scalpel blade.  Given the location of the defect, shape of the defect and the proximity to free margins a double island pedicle advancement flap was deemed most appropriate.  Using a sterile surgical marker, an appropriate advancement flap was drawn incorporating the defect, outlining the appropriate donor tissue and placing the expected incisions within the relaxed skin tension lines where possible.    The area thus outlined was incised deep to adipose tissue with a #15 scalpel blade.  The skin margins were undermined to an appropriate distance in all directions around the primary defect and laterally outward around the island pedicle utilizing iris scissors.  There was minimal undermining beneath the pedicle flap.
No Repair - Repaired With Adjacent Surgical Defect Text (Leave Blank If You Do Not Want): After the excision the defect was repaired concurrently with another surgical defect which was in close approximation.
Cheek Interpolation Flap Text: A decision was made to reconstruct the defect utilizing an interpolation axial flap and a staged reconstruction.  A telfa template was made of the defect.  This telfa template was then used to outline the Cheek Interpolation flap.  The donor area for the pedicle flap was then injected with anesthesia.  The flap was excised through the skin and subcutaneous tissue down to the layer of the underlying musculature.  The interpolation flap was carefully excised within this deep plane to maintain its blood supply.  The edges of the donor site were undermined.   The donor site was closed in a primary fashion.  The pedicle was then rotated into position and sutured.  Once the tube was sutured into place, adequate blood supply was confirmed with blanching and refill.  The pedicle was then wrapped with xeroform gauze and dressed appropriately with a telfa and gauze bandage to ensure continued blood supply and protect the attached pedicle.
Tissue Cultured Epidermal Autograft Text: The defect edges were debeveled with a #15 scalpel blade.  Given the location of the defect, shape of the defect and the proximity to free margins a tissue cultured epidermal autograft was deemed most appropriate.  The graft was then trimmed to fit the size of the defect.  The graft was then placed in the primary defect and oriented appropriately.
Star Wedge Flap Text: The defect edges were debeveled with a #15 scalpel blade.  Given the location of the defect, shape of the defect and the proximity to free margins a star wedge flap was deemed most appropriate.  Using a sterile surgical marker, an appropriate rotation flap was drawn incorporating the defect and placing the expected incisions within the relaxed skin tension lines where possible. The area thus outlined was incised deep to adipose tissue with a #15 scalpel blade.  The skin margins were undermined to an appropriate distance in all directions utilizing iris scissors.
Ftsg Text: The defect edges were debeveled with a #15 scalpel blade.  Given the location of the defect, shape of the defect and the proximity to free margins a full thickness skin graft was deemed most appropriate.  Using a sterile surgical marker, the primary defect shape was transferred to the donor site. The area thus outlined was incised deep to adipose tissue with a #15 scalpel blade.  The harvested graft was then trimmed of adipose tissue until only dermis and epidermis was left.  The skin margins of the secondary defect were undermined to an appropriate distance in all directions utilizing iris scissors.  The secondary defect was closed with interrupted buried subcutaneous sutures.  The skin edges were then re-apposed with running  sutures.  The skin graft was then placed in the primary defect and oriented appropriately.
H Plasty Text: Given the location of the defect, shape of the defect and the proximity to free margins a H-plasty was deemed most appropriate for repair.  Using a sterile surgical marker, the appropriate advancement arms of the H-plasty were drawn incorporating the defect and placing the expected incisions within the relaxed skin tension lines where possible. The area thus outlined was incised deep to adipose tissue with a #15 scalpel blade. The skin margins were undermined to an appropriate distance in all directions utilizing iris scissors.  The opposing advancement arms were then advanced into place in opposite direction and anchored with interrupted buried subcutaneous sutures.
Advancement Flap (Single) Text: The defect edges were debeveled with a #15 scalpel blade.  Given the location of the defect and the proximity to free margins a single advancement flap was deemed most appropriate.  Using a sterile surgical marker, an appropriate advancement flap was drawn incorporating the defect and placing the expected incisions within the relaxed skin tension lines where possible.    The area thus outlined was incised deep to adipose tissue with a #15 scalpel blade.  The skin margins were undermined to an appropriate distance in all directions utilizing iris scissors.
Rhombic Flap Text: The defect edges were debeveled with a #15 scalpel blade.  Given the location of the defect and the proximity to free margins a rhombic flap was deemed most appropriate.  Using a sterile surgical marker, an appropriate rhombic flap was drawn incorporating the defect.    The area thus outlined was incised deep to adipose tissue with a #15 scalpel blade.  The skin margins were undermined to an appropriate distance in all directions utilizing iris scissors.
Excision Method: Saucerization
Dressing: dry sterile dressing
Billing Type: Third-Party Bill
Island Pedicle Flap-Requiring Vessel Identification Text: The defect edges were debeveled with a #15 scalpel blade.  Given the location of the defect, shape of the defect and the proximity to free margins an island pedicle advancement flap was deemed most appropriate.  Using a sterile surgical marker, an appropriate advancement flap was drawn, based on the axial vessel mentioned above, incorporating the defect, outlining the appropriate donor tissue and placing the expected incisions within the relaxed skin tension lines where possible.    The area thus outlined was incised deep to adipose tissue with a #15 scalpel blade.  The skin margins were undermined to an appropriate distance in all directions around the primary defect and laterally outward around the island pedicle utilizing iris scissors.  There was minimal undermining beneath the pedicle flap.
Complex Repair And Rotation Flap Text: The defect edges were debeveled with a #15 scalpel blade.  The primary defect was closed partially with a complex linear closure.  Given the location of the remaining defect, shape of the defect and the proximity to free margins a rotation flap was deemed most appropriate for complete closure of the defect.  Using a sterile surgical marker, an appropriate advancement flap was drawn incorporating the defect and placing the expected incisions within the relaxed skin tension lines where possible.    The area thus outlined was incised deep to adipose tissue with a #15 scalpel blade.  The skin margins were undermined to an appropriate distance in all directions utilizing iris scissors.
Partial Purse String (Intermediate) Text: Given the location of the defect and the characteristics of the surrounding skin an intermediate purse string closure was deemed most appropriate.  Undermining was performed circumferentially around the surgical defect.  A purse string suture was then placed and tightened. Wound tension of the circular defect prevented complete closure of the wound.
Complex Repair And Dorsal Nasal Flap Text: The defect edges were debeveled with a #15 scalpel blade.  The primary defect was closed partially with a complex linear closure.  Given the location of the remaining defect, shape of the defect and the proximity to free margins a dorsal nasal flap was deemed most appropriate for complete closure of the defect.  Using a sterile surgical marker, an appropriate flap was drawn incorporating the defect and placing the expected incisions within the relaxed skin tension lines where possible.    The area thus outlined was incised deep to adipose tissue with a #15 scalpel blade.  The skin margins were undermined to an appropriate distance in all directions utilizing iris scissors.
Elliptical Excision Additional Text (Leave Blank If You Do Not Want): The margin was drawn around the clinically apparent lesion.  An elliptical shape was then drawn on the skin incorporating the lesion and margins.  Incisions were then made along these lines to the appropriate tissue plane and the lesion was extirpated.
Cartilage Graft Text: The defect edges were debeveled with a #15 scalpel blade.  Given the location of the defect, shape of the defect, the fact the defect involved a full thickness cartilage defect a cartilage graft was deemed most appropriate.  An appropriate donor site was identified, cleansed, and anesthetized. The cartilage graft was then harvested and transferred to the recipient site, oriented appropriately and then sutured into place.  The secondary defect was then repaired using a primary closure.
Perilesional Excision Additional Text (Leave Blank If You Do Not Want): The margin was drawn around the clinically apparent lesion. Incisions were then made along these lines to the appropriate tissue plane and the lesion was extirpated.
V-Y Plasty Text: The defect edges were debeveled with a #15 scalpel blade.  Given the location of the defect, shape of the defect and the proximity to free margins an V-Y advancement flap was deemed most appropriate.  Using a sterile surgical marker, an appropriate advancement flap was drawn incorporating the defect and placing the expected incisions within the relaxed skin tension lines where possible.    The area thus outlined was incised deep to adipose tissue with a #15 scalpel blade.  The skin margins were undermined to an appropriate distance in all directions utilizing iris scissors.
Size Of Margin In Cm: 0.2
Bilobed Flap Text: The defect edges were debeveled with a #15 scalpel blade.  Given the location of the defect and the proximity to free margins a bilobe flap was deemed most appropriate.  Using a sterile surgical marker, an appropriate bilobe flap drawn around the defect.    The area thus outlined was incised deep to adipose tissue with a #15 scalpel blade.  The skin margins were undermined to an appropriate distance in all directions utilizing iris scissors.
Hatchet Flap Text: The defect edges were debeveled with a #15 scalpel blade.  Given the location of the defect, shape of the defect and the proximity to free margins a hatchet flap was deemed most appropriate.  Using a sterile surgical marker, an appropriate hatchet flap was drawn incorporating the defect and placing the expected incisions within the relaxed skin tension lines where possible.    The area thus outlined was incised deep to adipose tissue with a #15 scalpel blade.  The skin margins were undermined to an appropriate distance in all directions utilizing iris scissors.
Lab: [id number]
O-T Advancement Flap Text: The defect edges were debeveled with a #15 scalpel blade.  Given the location of the defect, shape of the defect and the proximity to free margins an O-T advancement flap was deemed most appropriate.  Using a sterile surgical marker, an appropriate advancement flap was drawn incorporating the defect and placing the expected incisions within the relaxed skin tension lines where possible.    The area thus outlined was incised deep to adipose tissue with a #15 scalpel blade.  The skin margins were undermined to an appropriate distance in all directions utilizing iris scissors.
Complex Repair And Rhombic Flap Text: The defect edges were debeveled with a #15 scalpel blade.  The primary defect was closed partially with a complex linear closure.  Given the location of the remaining defect, shape of the defect and the proximity to free margins a rhombic flap was deemed most appropriate for complete closure of the defect.  Using a sterile surgical marker, an appropriate advancement flap was drawn incorporating the defect and placing the expected incisions within the relaxed skin tension lines where possible.    The area thus outlined was incised deep to adipose tissue with a #15 scalpel blade.  The skin margins were undermined to an appropriate distance in all directions utilizing iris scissors.
Ear Star Wedge Flap Text: The defect edges were debeveled with a #15 blade scalpel.  Given the location of the defect and the proximity to free margins (helical rim) an ear star wedge flap was deemed most appropriate.  Using a sterile surgical marker, the appropriate flap was drawn incorporating the defect and placing the expected incisions between the helical rim and antihelix where possible.  The area thus outlined was incised through and through with a #15 scalpel blade.
Spiral Flap Text: The defect edges were debeveled with a #15 scalpel blade.  Given the location of the defect, shape of the defect and the proximity to free margins a spiral flap was deemed most appropriate.  Using a sterile surgical marker, an appropriate rotation flap was drawn incorporating the defect and placing the expected incisions within the relaxed skin tension lines where possible. The area thus outlined was incised deep to adipose tissue with a #15 scalpel blade.  The skin margins were undermined to an appropriate distance in all directions utilizing iris scissors.
O-L Flap Text: The defect edges were debeveled with a #15 scalpel blade.  Given the location of the defect, shape of the defect and the proximity to free margins an O-L flap was deemed most appropriate.  Using a sterile surgical marker, an appropriate advancement flap was drawn incorporating the defect and placing the expected incisions within the relaxed skin tension lines where possible.    The area thus outlined was incised deep to adipose tissue with a #15 scalpel blade.  The skin margins were undermined to an appropriate distance in all directions utilizing iris scissors.
Complex Repair And Melolabial Flap Text: The defect edges were debeveled with a #15 scalpel blade.  The primary defect was closed partially with a complex linear closure.  Given the location of the remaining defect, shape of the defect and the proximity to free margins a melolabial flap was deemed most appropriate for complete closure of the defect.  Using a sterile surgical marker, an appropriate advancement flap was drawn incorporating the defect and placing the expected incisions within the relaxed skin tension lines where possible.    The area thus outlined was incised deep to adipose tissue with a #15 scalpel blade.  The skin margins were undermined to an appropriate distance in all directions utilizing iris scissors.
Xenograft Text: The defect edges were debeveled with a #15 scalpel blade.  Given the location of the defect, shape of the defect and the proximity to free margins a xenograft was deemed most appropriate.  The graft was then trimmed to fit the size of the defect.  The graft was then placed in the primary defect and oriented appropriately.
Pre-Excision Curettage Text (Leave Blank If You Do Not Want): Prior to drawing the surgical margin the visible lesion was removed with electrodesiccation and curettage to clearly define the lesion size.
Detail Level: Detailed
Complex Repair And Z Plasty Text: The defect edges were debeveled with a #15 scalpel blade.  The primary defect was closed partially with a complex linear closure.  Given the location of the remaining defect, shape of the defect and the proximity to free margins a Z plasty was deemed most appropriate for complete closure of the defect.  Using a sterile surgical marker, an appropriate advancement flap was drawn incorporating the defect and placing the expected incisions within the relaxed skin tension lines where possible.    The area thus outlined was incised deep to adipose tissue with a #15 scalpel blade.  The skin margins were undermined to an appropriate distance in all directions utilizing iris scissors.
Keystone Flap Text: The defect edges were debeveled with a #15 scalpel blade.  Given the location of the defect, shape of the defect a keystone flap was deemed most appropriate.  Using a sterile surgical marker, an appropriate keystone flap was drawn incorporating the defect, outlining the appropriate donor tissue and placing the expected incisions within the relaxed skin tension lines where possible. The area thus outlined was incised deep to adipose tissue with a #15 scalpel blade.  The skin margins were undermined to an appropriate distance in all directions around the primary defect and laterally outward around the flap utilizing iris scissors.
O-T Plasty Text: The defect edges were debeveled with a #15 scalpel blade.  Given the location of the defect, shape of the defect and the proximity to free margins an O-T plasty was deemed most appropriate.  Using a sterile surgical marker, an appropriate O-T plasty was drawn incorporating the defect and placing the expected incisions within the relaxed skin tension lines where possible.    The area thus outlined was incised deep to adipose tissue with a #15 scalpel blade.  The skin margins were undermined to an appropriate distance in all directions utilizing iris scissors.
Helical Rim Advancement Flap Text: The defect edges were debeveled with a #15 blade scalpel.  Given the location of the defect and the proximity to free margins (helical rim) a double helical rim advancement flap was deemed most appropriate.  Using a sterile surgical marker, the appropriate advancement flaps were drawn incorporating the defect and placing the expected incisions between the helical rim and antihelix where possible.  The area thus outlined was incised through and through with a #15 scalpel blade.  With a skin hook and iris scissors, the flaps were gently and sharply undermined and freed up.
Estimated Blood Loss (Cc): minimal
Complex Repair And Xenograft Text: The defect edges were debeveled with a #15 scalpel blade.  The primary defect was closed partially with a complex linear closure.  Given the location of the defect, shape of the defect and the proximity to free margins a xenograft was deemed most appropriate to repair the remaining defect.  The graft was trimmed to fit the size of the remaining defect.  The graft was then placed in the primary defect, oriented appropriately, and sutured into place.
Alar Island Pedicle Flap Text: The defect edges were debeveled with a #15 scalpel blade.  Given the location of the defect, shape of the defect and the proximity to the alar rim an island pedicle advancement flap was deemed most appropriate.  Using a sterile surgical marker, an appropriate advancement flap was drawn incorporating the defect, outlining the appropriate donor tissue and placing the expected incisions within the nasal ala running parallel to the alar rim. The area thus outlined was incised with a #15 scalpel blade.  The skin margins were undermined minimally to an appropriate distance in all directions around the primary defect and laterally outward around the island pedicle utilizing iris scissors.  There was minimal undermining beneath the pedicle flap.
Scalpel Size: 15 blade
Bilobed Transposition Flap Text: The defect edges were debeveled with a #15 scalpel blade.  Given the location of the defect and the proximity to free margins a bilobed transposition flap was deemed most appropriate.  Using a sterile surgical marker, an appropriate bilobe flap drawn around the defect.    The area thus outlined was incised deep to adipose tissue with a #15 scalpel blade.  The skin margins were undermined to an appropriate distance in all directions utilizing iris scissors.
Lab Facility: 
Complex Repair And Tissue Cultured Epidermal Autograft Text: The defect edges were debeveled with a #15 scalpel blade.  The primary defect was closed partially with a complex linear closure.  Given the location of the defect, shape of the defect and the proximity to free margins an tissue cultured epidermal autograft was deemed most appropriate to repair the remaining defect.  The graft was trimmed to fit the size of the remaining defect.  The graft was then placed in the primary defect, oriented appropriately, and sutured into place.
W Plasty Text: The lesion was extirpated to the level of the fat with a #15 scalpel blade.  Given the location of the defect, shape of the defect and the proximity to free margins a W-plasty was deemed most appropriate for repair.  Using a sterile surgical marker, the appropriate transposition arms of the W-plasty were drawn incorporating the defect and placing the expected incisions within the relaxed skin tension lines where possible.    The area thus outlined was incised deep to adipose tissue with a #15 scalpel blade.  The skin margins were undermined to an appropriate distance in all directions utilizing iris scissors.  The opposing transposition arms were then transposed into place in opposite direction and anchored with interrupted buried subcutaneous sutures.
Complex Repair And Double Advancement Flap Text: The defect edges were debeveled with a #15 scalpel blade.  The primary defect was closed partially with a complex linear closure.  Given the location of the remaining defect, shape of the defect and the proximity to free margins a double advancement flap was deemed most appropriate for complete closure of the defect.  Using a sterile surgical marker, an appropriate advancement flap was drawn incorporating the defect and placing the expected incisions within the relaxed skin tension lines where possible.    The area thus outlined was incised deep to adipose tissue with a #15 scalpel blade.  The skin margins were undermined to an appropriate distance in all directions utilizing iris scissors.
Mucosal Advancement Flap Text: Given the location of the defect, shape of the defect and the proximity to free margins a mucosal advancement flap was deemed most appropriate. Incisions were made with a 15 blade scalpel in the appropriate fashion along the cutaneous vermilion border and the mucosal lip. The remaining actinically damaged mucosal tissue was excised.  The mucosal advancement flap was then elevated to the gingival sulcus with care taken to preserve the neurovascular structures and advanced into the primary defect. Care was taken to ensure that precise realignment of the vermilion border was achieved.
Advancement Flap (Double) Text: The defect edges were debeveled with a #15 scalpel blade.  Given the location of the defect and the proximity to free margins a double advancement flap was deemed most appropriate.  Using a sterile surgical marker, the appropriate advancement flaps were drawn incorporating the defect and placing the expected incisions within the relaxed skin tension lines where possible.    The area thus outlined was incised deep to adipose tissue with a #15 scalpel blade.  The skin margins were undermined to an appropriate distance in all directions utilizing iris scissors.
Complex Repair And A-T Advancement Flap Text: The defect edges were debeveled with a #15 scalpel blade.  The primary defect was closed partially with a complex linear closure.  Given the location of the remaining defect, shape of the defect and the proximity to free margins an A-T advancement flap was deemed most appropriate for complete closure of the defect.  Using a sterile surgical marker, an appropriate advancement flap was drawn incorporating the defect and placing the expected incisions within the relaxed skin tension lines where possible.    The area thus outlined was incised deep to adipose tissue with a #15 scalpel blade.  The skin margins were undermined to an appropriate distance in all directions utilizing iris scissors.
Composite Graft Text: The defect edges were debeveled with a #15 scalpel blade.  Given the location of the defect, shape of the defect, the proximity to free margins and the fact the defect was full thickness a composite graft was deemed most appropriate.  The defect was outline and then transferred to the donor site.  A full thickness graft was then excised from the donor site. The graft was then placed in the primary defect, oriented appropriately and then sutured into place.  The secondary defect was then repaired using a primary closure.
Lip Wedge Excision Repair Text: Given the location of the defect and the proximity to free margins a full thickness wedge repair was deemed most appropriate.  Using a sterile surgical marker, the appropriate repair was drawn incorporating the defect and placing the expected incisions perpendicular to the vermilion border.  The vermilion border was also meticulously outlined to ensure appropriate reapproximation during the repair.  The area thus outlined was incised through and through with a #15 scalpel blade.  The muscularis and dermis were reaproximated with deep sutures following hemostasis. Care was taken to realign the vermilion border before proceeding with the superficial closure.  Once the vermilion was realigned the superfical and mucosal closure was finished.
Bilateral Helical Rim Advancement Flap Text: The defect edges were debeveled with a #15 blade scalpel.  Given the location of the defect and the proximity to free margins (helical rim) a bilateral helical rim advancement flap was deemed most appropriate.  Using a sterile surgical marker, the appropriate advancement flaps were drawn incorporating the defect and placing the expected incisions between the helical rim and antihelix where possible.  The area thus outlined was incised through and through with a #15 scalpel blade.  With a skin hook and iris scissors, the flaps were gently and sharply undermined and freed up.
Slit Excision Additional Text (Leave Blank If You Do Not Want): A linear line was drawn on the skin overlying the lesion. An incision was made slowly until the lesion was visualized.  Once visualized, the lesion was removed with blunt dissection.
Island Pedicle Flap With Canthal Suspension Text: The defect edges were debeveled with a #15 scalpel blade.  Given the location of the defect, shape of the defect and the proximity to free margins an island pedicle advancement flap was deemed most appropriate.  Using a sterile surgical marker, an appropriate advancement flap was drawn incorporating the defect, outlining the appropriate donor tissue and placing the expected incisions within the relaxed skin tension lines where possible. The area thus outlined was incised deep to adipose tissue with a #15 scalpel blade.  The skin margins were undermined to an appropriate distance in all directions around the primary defect and laterally outward around the island pedicle utilizing iris scissors.  There was minimal undermining beneath the pedicle flap. A suspension suture was placed in the canthal tendon to prevent tension and prevent ectropion.
Trilobed Flap Text: The defect edges were debeveled with a #15 scalpel blade.  Given the location of the defect and the proximity to free margins a trilobed flap was deemed most appropriate.  Using a sterile surgical marker, an appropriate trilobed flap drawn around the defect.    The area thus outlined was incised deep to adipose tissue with a #15 scalpel blade.  The skin margins were undermined to an appropriate distance in all directions utilizing iris scissors.
Excisional Biopsy Additional Text (Leave Blank If You Do Not Want): The margin was drawn around the clinically apparent lesion. An elliptical shape was then drawn on the skin incorporating the lesion and margins.  Incisions were then made along these lines to the appropriate tissue plane and the lesion was extirpated.
Bi-Rhombic Flap Text: The defect edges were debeveled with a #15 scalpel blade.  Given the location of the defect and the proximity to free margins a bi-rhombic flap was deemed most appropriate.  Using a sterile surgical marker, an appropriate rhombic flap was drawn incorporating the defect. The area thus outlined was incised deep to adipose tissue with a #15 scalpel blade.  The skin margins were undermined to an appropriate distance in all directions utilizing iris scissors.

## 2018-04-20 ENCOUNTER — HOSPITAL ENCOUNTER (OUTPATIENT)
Dept: LAB | Facility: MEDICAL CENTER | Age: 73
End: 2018-04-20
Attending: INTERNAL MEDICINE
Payer: MEDICARE

## 2018-04-20 DIAGNOSIS — E03.4 HYPOTHYROIDISM DUE TO ACQUIRED ATROPHY OF THYROID: ICD-10-CM

## 2018-04-20 DIAGNOSIS — E78.5 HYPERLIPIDEMIA LDL GOAL <100: ICD-10-CM

## 2018-04-20 DIAGNOSIS — Z12.5 SCREENING FOR PROSTATE CANCER: ICD-10-CM

## 2018-04-20 DIAGNOSIS — Z11.59 NEED FOR HEPATITIS C SCREENING TEST: ICD-10-CM

## 2018-04-20 LAB
ALBUMIN SERPL BCP-MCNC: 4.1 G/DL (ref 3.2–4.9)
ALBUMIN/GLOB SERPL: 1.4 G/DL
ALP SERPL-CCNC: 83 U/L (ref 30–99)
ALT SERPL-CCNC: 22 U/L (ref 2–50)
ANION GAP SERPL CALC-SCNC: 6 MMOL/L (ref 0–11.9)
AST SERPL-CCNC: 22 U/L (ref 12–45)
BILIRUB SERPL-MCNC: 0.9 MG/DL (ref 0.1–1.5)
BUN SERPL-MCNC: 24 MG/DL (ref 8–22)
CALCIUM SERPL-MCNC: 9.4 MG/DL (ref 8.5–10.5)
CHLORIDE SERPL-SCNC: 105 MMOL/L (ref 96–112)
CHOLEST SERPL-MCNC: 136 MG/DL (ref 100–199)
CO2 SERPL-SCNC: 29 MMOL/L (ref 20–33)
CREAT SERPL-MCNC: 1 MG/DL (ref 0.5–1.4)
GLOBULIN SER CALC-MCNC: 2.9 G/DL (ref 1.9–3.5)
GLUCOSE SERPL-MCNC: 99 MG/DL (ref 65–99)
HCV AB SER QL: NEGATIVE
HDLC SERPL-MCNC: 44 MG/DL
LDLC SERPL CALC-MCNC: 68 MG/DL
POTASSIUM SERPL-SCNC: 4.6 MMOL/L (ref 3.6–5.5)
PROT SERPL-MCNC: 7 G/DL (ref 6–8.2)
PSA SERPL-MCNC: 2.06 NG/ML (ref 0–4)
SODIUM SERPL-SCNC: 140 MMOL/L (ref 135–145)
T4 FREE SERPL-MCNC: 0.82 NG/DL (ref 0.53–1.43)
TRIGL SERPL-MCNC: 118 MG/DL (ref 0–149)
TSH SERPL DL<=0.005 MIU/L-ACNC: 2.54 UIU/ML (ref 0.38–5.33)

## 2018-04-20 PROCEDURE — 84439 ASSAY OF FREE THYROXINE: CPT

## 2018-04-20 PROCEDURE — 84153 ASSAY OF PSA TOTAL: CPT

## 2018-04-20 PROCEDURE — 80061 LIPID PANEL: CPT

## 2018-04-20 PROCEDURE — 84443 ASSAY THYROID STIM HORMONE: CPT

## 2018-04-20 PROCEDURE — 86803 HEPATITIS C AB TEST: CPT

## 2018-04-20 PROCEDURE — 36415 COLL VENOUS BLD VENIPUNCTURE: CPT

## 2018-04-20 PROCEDURE — 80053 COMPREHEN METABOLIC PANEL: CPT

## 2018-04-26 ENCOUNTER — OFFICE VISIT (OUTPATIENT)
Dept: MEDICAL GROUP | Age: 73
End: 2018-04-26
Payer: MEDICARE

## 2018-04-26 VITALS
HEIGHT: 70 IN | OXYGEN SATURATION: 92 % | DIASTOLIC BLOOD PRESSURE: 82 MMHG | SYSTOLIC BLOOD PRESSURE: 122 MMHG | BODY MASS INDEX: 38.08 KG/M2 | WEIGHT: 266 LBS | TEMPERATURE: 98.3 F | HEART RATE: 68 BPM | RESPIRATION RATE: 16 BRPM

## 2018-04-26 DIAGNOSIS — R91.1 LUNG NODULE: ICD-10-CM

## 2018-04-26 DIAGNOSIS — E78.5 HYPERLIPIDEMIA LDL GOAL <100: ICD-10-CM

## 2018-04-26 DIAGNOSIS — E03.4 HYPOTHYROIDISM DUE TO ACQUIRED ATROPHY OF THYROID: ICD-10-CM

## 2018-04-26 DIAGNOSIS — E66.9 OBESITY (BMI 35.0-39.9 WITHOUT COMORBIDITY): ICD-10-CM

## 2018-04-26 DIAGNOSIS — R00.1 BRADYCARDIA WITH 51-60 BEATS PER MINUTE: ICD-10-CM

## 2018-04-26 PROCEDURE — 99214 OFFICE O/P EST MOD 30 MIN: CPT | Performed by: INTERNAL MEDICINE

## 2018-04-26 RX ORDER — ATORVASTATIN CALCIUM 20 MG/1
20 TABLET, FILM COATED ORAL
Qty: 90 TAB | Refills: 3 | Status: SHIPPED | OUTPATIENT
Start: 2018-04-26 | End: 2019-04-25 | Stop reason: SDUPTHER

## 2018-04-26 NOTE — ASSESSMENT & PLAN NOTE
Patient is taking atorvastatin 20 mg every evening. He tolerates atorvastatin without side effects. His liver enzymes are normal. His cholesterol is stable, well controlled. I reviewed blood test results with him in clinic today.    Results for DEWAYNE VILLEGAS (MRN [id number]) as of 4/26/2018 08:23   Ref. Range 4/20/2018 08:16   Cholesterol,Tot Latest Ref Range: 100 - 199 mg/dL 136   Triglycerides Latest Ref Range: 0 - 149 mg/dL 118   HDL Latest Ref Range: >=40 mg/dL 44   LDL Latest Ref Range: <100 mg/dL 68

## 2018-04-26 NOTE — ASSESSMENT & PLAN NOTE
Patient has sinus bradycardia. His heart beat is between 50 to 70. However, patient is completely asymptomatic. He is physically active and exercises twice a day. He denied fatigue, palpitation, dizziness, syncopal.

## 2018-04-26 NOTE — ASSESSMENT & PLAN NOTE
Patient has Charcot soft smoking history for 2 years and quit smoking 40 years ago. His CT chest on October 26, 2017 showed small nodules on right lung measuring up to 6 mm. Patient is asymptomatic. We discussed to repeat CT scan in 12 months. Patient agreed to repeat CT scan in October 2018.

## 2018-04-26 NOTE — ASSESSMENT & PLAN NOTE
Patient is taking levothyroxine 100 µg every morning. He is in euthyroid state. He denies side effects from taking it.    Results for DEWAYNE VILLEGAS (MRN [id number]) as of 4/26/2018 08:23   Ref. Range 4/20/2018 08:16   TSH Latest Ref Range: 0.380 - 5.330 uIU/mL 2.540   Free T-4 Latest Ref Range: 0.53 - 1.43 ng/dL 0.82

## 2018-04-26 NOTE — PROGRESS NOTES
Subjective:   Dewayne Moon is a 72 y.o. male here today for evaluation and management of:      Bradycardia with 51-60 beats per minute  Patient has sinus bradycardia. His heart beat is between 50 to 70. However, patient is completely asymptomatic. He is physically active and exercises twice a day. He denied fatigue, palpitation, dizziness, syncopal.    Hyperlipidemia LDL goal <100  Patient is taking atorvastatin 20 mg every evening. He tolerates atorvastatin without side effects. His liver enzymes are normal. His cholesterol is stable, well controlled. I reviewed blood test results with him in clinic today.    Results for DEWAYNE MOON (MRN [id number]) as of 4/26/2018 08:23   Ref. Range 4/20/2018 08:16   Cholesterol,Tot Latest Ref Range: 100 - 199 mg/dL 136   Triglycerides Latest Ref Range: 0 - 149 mg/dL 118   HDL Latest Ref Range: >=40 mg/dL 44   LDL Latest Ref Range: <100 mg/dL 68       Hypothyroidism due to acquired atrophy of thyroid  Patient is taking levothyroxine 100 µg every morning. He is in euthyroid state. He denies side effects from taking it.    Results for DEWAYNE MOON (MRN [id number]) as of 4/26/2018 08:23   Ref. Range 4/20/2018 08:16   TSH Latest Ref Range: 0.380 - 5.330 uIU/mL 2.540   Free T-4 Latest Ref Range: 0.53 - 1.43 ng/dL 0.82       Lung nodule  Patient has Charcot soft smoking history for 2 years and quit smoking 40 years ago. His CT chest on October 26, 2017 showed small nodules on right lung measuring up to 6 mm. Patient is asymptomatic. We discussed to repeat CT scan in 12 months. Patient agreed to repeat CT scan in October 2018.    Obesity (BMI 35.0-39.9 without comorbidity) (HCC)  Patient stated that he is exercising regularly. He also tries to eat healthy diet. His body weight is stable at 266 pounds.         Current medicines (including changes today)  Current Outpatient Prescriptions   Medication Sig Dispense Refill   • atorvastatin (LIPITOR) 20 MG Tab Take 1 Tab by  "mouth every bedtime. 90 Tab 3   • levothyroxine (SYNTHROID) 100 MCG Tab Take 1 Tab by mouth Every morning on an empty stomach. 90 Tab 3   • bimatoprost (LUMIGAN) 0.01 % Solution Place 1 Drop in both eyes every bedtime.     • fluticasone (FLONASE) 50 MCG/ACT nasal spray Spray 2 Sprays in nose every day. Each Nostril 16 g 3   • triamcinolone acetonide (KENALOG) 0.1 % Cream Apply  to affected area(s) 2 times a day.       No current facility-administered medications for this visit.      He  has a past medical history of BPH (benign prostatic hyperplasia); Hyperlipidemia; and Thyroid disease.    ROS   No chest pain, no shortness of breath, no abdominal pain       Objective:     Blood pressure 122/82, pulse 68, temperature 36.8 °C (98.3 °F), resp. rate 16, height 1.778 m (5' 10\"), weight 120.7 kg (266 lb), SpO2 92 %. Body mass index is 38.17 kg/m².   Physical Exam:  General: Alert, oriented and no acute distress.  Eye contact is good, speech goal directed, affect calm  HEENT: conjunctiva non-injected, sclera non-icteric.  Oral mucous membranes pink and moist with no lesions.  Pinna normal.  Lungs: Normal respiratory effort, clear to auscultation bilaterally with good excursion.  CV: regular rate and rhythm. No murmurs.   Abdomen: soft, non distended, nontender, Bowel sound normal.  Ext: no edema, color normal, vascularity normal, temperature normal      Assessment and Plan:   The following treatment plan was discussed     1. Bradycardia with 51-60 beats per minute  - Asymptomatic. Continue to monitor.    2. Hyperlipidemia LDL goal <100  - Well-controlled. Continue current regimens. Recheck lab 1-2 weeks before next follow up visit. Refilled medication today.  - Review potential side effects of atorvastatin with patient.  - Advised to eat low fat, low carbohydrate and high fiber diet as well as do cardio physical exercise regularly.   - COMP METABOLIC PANEL; Future  - LIPID PROFILE; Future  - atorvastatin (LIPITOR) 20 MG " Tab; Take 1 Tab by mouth every bedtime.  Dispense: 90 Tab; Refill: 3    3. Hypothyroidism due to acquired atrophy of thyroid  - Well-controlled. Continue current regimens. Recheck lab 1-2 weeks before next follow up visit.  - TSH; Future  - FREE THYROXINE; Future    4. Lung nodule  - Asymptomatic. Patient will schedule for CT chest in October 2018 for follow-up. Patient is advised to do fasting blood tests before CT chest.  - CT-CHEST (THORAX) WITH; Future    5. Obesity (BMI 35.0-39.9 without comorbidity)  - Counseled for healthy diet and regular physical exercise to lose weight.   - Patient identified as having weight management issue.  Appropriate orders and counseling given.        Followup: Return in about 6 months (around 10/26/2018), or if symptoms worsen or fail to improve, for hypothyroid, hyperlipidemia, lung nodule, lab review.      Please note that this dictation was created using voice recognition software. I have made every reasonable attempt to correct obvious errors, but I expect that there may have unintended errors in text, spelling, punctuation, or grammar that I did not discover.

## 2018-04-26 NOTE — ASSESSMENT & PLAN NOTE
Patient stated that he is exercising regularly. He also tries to eat healthy diet. His body weight is stable at 266 pounds.

## 2018-04-28 DIAGNOSIS — E78.5 HYPERLIPIDEMIA LDL GOAL <100: ICD-10-CM

## 2018-04-30 RX ORDER — ATORVASTATIN CALCIUM 20 MG/1
TABLET, FILM COATED ORAL
Refills: 3 | OUTPATIENT
Start: 2018-04-30

## 2018-05-17 ENCOUNTER — APPOINTMENT (OUTPATIENT)
Dept: OTHER | Facility: IMAGING CENTER | Age: 73
End: 2018-05-17
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
## 2018-08-24 ENCOUNTER — APPOINTMENT (RX ONLY)
Dept: URBAN - METROPOLITAN AREA CLINIC 4 | Facility: CLINIC | Age: 73
Setting detail: DERMATOLOGY
End: 2018-08-24

## 2018-08-24 DIAGNOSIS — L57.0 ACTINIC KERATOSIS: ICD-10-CM

## 2018-08-24 DIAGNOSIS — L81.4 OTHER MELANIN HYPERPIGMENTATION: ICD-10-CM

## 2018-08-24 DIAGNOSIS — L82.1 OTHER SEBORRHEIC KERATOSIS: ICD-10-CM

## 2018-08-24 PROBLEM — D48.5 NEOPLASM OF UNCERTAIN BEHAVIOR OF SKIN: Status: ACTIVE | Noted: 2018-08-24

## 2018-08-24 PROCEDURE — 99213 OFFICE O/P EST LOW 20 MIN: CPT | Mod: 25

## 2018-08-24 PROCEDURE — ? BIOPSY BY SHAVE METHOD

## 2018-08-24 PROCEDURE — 17000 DESTRUCT PREMALG LESION: CPT

## 2018-08-24 PROCEDURE — ? COUNSELING

## 2018-08-24 PROCEDURE — 11100: CPT | Mod: 59

## 2018-08-24 PROCEDURE — ? LIQUID NITROGEN

## 2018-08-24 PROCEDURE — 17003 DESTRUCT PREMALG LES 2-14: CPT

## 2018-08-24 ASSESSMENT — LOCATION ZONE DERM
LOCATION ZONE: LEG
LOCATION ZONE: NOSE
LOCATION ZONE: ARM
LOCATION ZONE: FACE
LOCATION ZONE: SCALP

## 2018-08-24 ASSESSMENT — LOCATION SIMPLE DESCRIPTION DERM
LOCATION SIMPLE: RIGHT FOREHEAD
LOCATION SIMPLE: LEFT CHEEK
LOCATION SIMPLE: RIGHT CHEEK
LOCATION SIMPLE: NOSE
LOCATION SIMPLE: LEFT CALF
LOCATION SIMPLE: LEFT SCALP
LOCATION SIMPLE: RIGHT SHOULDER
LOCATION SIMPLE: SCALP
LOCATION SIMPLE: LEFT FOREHEAD

## 2018-08-24 ASSESSMENT — LOCATION DETAILED DESCRIPTION DERM
LOCATION DETAILED: LEFT MEDIAL FRONTAL SCALP
LOCATION DETAILED: NASAL SUPRATIP
LOCATION DETAILED: RIGHT ANTERIOR SHOULDER
LOCATION DETAILED: RIGHT CENTRAL MALAR CHEEK
LOCATION DETAILED: LEFT SUPERIOR FOREHEAD
LOCATION DETAILED: LEFT CENTRAL FRONTAL SCALP
LOCATION DETAILED: RIGHT SUPERIOR FOREHEAD
LOCATION DETAILED: LEFT INFERIOR CENTRAL MALAR CHEEK
LOCATION DETAILED: RIGHT CENTRAL PARIETAL SCALP
LOCATION DETAILED: LEFT DISTAL MEDIAL CALF
LOCATION DETAILED: LEFT LATERAL FRONTAL SCALP
LOCATION DETAILED: RIGHT POSTERIOR SHOULDER

## 2018-08-24 NOTE — PROCEDURE: LIQUID NITROGEN
Render Post-Care Instructions In Note?: no
Medical Necessity Information: It is in your best interest to select a reason for this procedure from the list below. All of these items fulfill various CMS LCD requirements except the new and changing color options.
Duration Of Freeze Thaw-Cycle (Seconds): 0
Post-Care Instructions: I reviewed with the patient in detail post-care instructions. Patient is to wear sunprotection, and avoid picking at any of the treated lesions. Pt may apply Vaseline to crusted or scabbing areas.
Consent: The patient's consent was obtained including but not limited to risks of crusting, scabbing, blistering, scarring, darker or lighter pigmentary change, recurrence, incomplete removal and infection.
Detail Level: Detailed
Medical Necessity Clause: This procedure was medically necessary because the lesions that were treated were:  If lesion does not resolve, bx is needed.

## 2018-09-30 DIAGNOSIS — E03.4 HYPOTHYROIDISM DUE TO ACQUIRED ATROPHY OF THYROID: ICD-10-CM

## 2018-10-01 RX ORDER — LEVOTHYROXINE SODIUM 100 MCG
TABLET ORAL
Qty: 90 TAB | Refills: 3 | Status: SHIPPED | OUTPATIENT
Start: 2018-10-01 | End: 2019-11-14

## 2018-10-02 ENCOUNTER — HOSPITAL ENCOUNTER (OUTPATIENT)
Dept: LAB | Facility: MEDICAL CENTER | Age: 73
End: 2018-10-02
Attending: INTERNAL MEDICINE
Payer: MEDICARE

## 2018-10-02 DIAGNOSIS — E03.4 HYPOTHYROIDISM DUE TO ACQUIRED ATROPHY OF THYROID: ICD-10-CM

## 2018-10-02 DIAGNOSIS — E78.5 HYPERLIPIDEMIA LDL GOAL <100: ICD-10-CM

## 2018-10-02 LAB
ALBUMIN SERPL BCP-MCNC: 4.1 G/DL (ref 3.2–4.9)
ALBUMIN/GLOB SERPL: 1.2 G/DL
ALP SERPL-CCNC: 82 U/L (ref 30–99)
ALT SERPL-CCNC: 24 U/L (ref 2–50)
ANION GAP SERPL CALC-SCNC: 9 MMOL/L (ref 0–11.9)
AST SERPL-CCNC: 22 U/L (ref 12–45)
BILIRUB SERPL-MCNC: 1.1 MG/DL (ref 0.1–1.5)
BUN SERPL-MCNC: 23 MG/DL (ref 8–22)
CALCIUM SERPL-MCNC: 9.8 MG/DL (ref 8.5–10.5)
CHLORIDE SERPL-SCNC: 102 MMOL/L (ref 96–112)
CHOLEST SERPL-MCNC: 127 MG/DL (ref 100–199)
CO2 SERPL-SCNC: 28 MMOL/L (ref 20–33)
CREAT SERPL-MCNC: 1.17 MG/DL (ref 0.5–1.4)
FASTING STATUS PATIENT QL REPORTED: NORMAL
GLOBULIN SER CALC-MCNC: 3.5 G/DL (ref 1.9–3.5)
GLUCOSE SERPL-MCNC: 94 MG/DL (ref 65–99)
HDLC SERPL-MCNC: 41 MG/DL
LDLC SERPL CALC-MCNC: 63 MG/DL
POTASSIUM SERPL-SCNC: 4.3 MMOL/L (ref 3.6–5.5)
PROT SERPL-MCNC: 7.6 G/DL (ref 6–8.2)
SODIUM SERPL-SCNC: 139 MMOL/L (ref 135–145)
TRIGL SERPL-MCNC: 117 MG/DL (ref 0–149)

## 2018-10-02 PROCEDURE — 80053 COMPREHEN METABOLIC PANEL: CPT

## 2018-10-02 PROCEDURE — 84443 ASSAY THYROID STIM HORMONE: CPT

## 2018-10-02 PROCEDURE — 80061 LIPID PANEL: CPT

## 2018-10-02 PROCEDURE — 84439 ASSAY OF FREE THYROXINE: CPT

## 2018-10-02 PROCEDURE — 36415 COLL VENOUS BLD VENIPUNCTURE: CPT

## 2018-10-03 LAB
T4 FREE SERPL-MCNC: 0.93 NG/DL (ref 0.53–1.43)
TSH SERPL DL<=0.005 MIU/L-ACNC: 2.72 UIU/ML (ref 0.38–5.33)

## 2018-10-04 ENCOUNTER — APPOINTMENT (RX ONLY)
Dept: URBAN - METROPOLITAN AREA CLINIC 4 | Facility: CLINIC | Age: 73
Setting detail: DERMATOLOGY
End: 2018-10-04

## 2018-10-04 DIAGNOSIS — Z85.820 PERSONAL HISTORY OF MALIGNANT MELANOMA OF SKIN: ICD-10-CM

## 2018-10-04 DIAGNOSIS — D18.0 HEMANGIOMA: ICD-10-CM

## 2018-10-04 DIAGNOSIS — L81.4 OTHER MELANIN HYPERPIGMENTATION: ICD-10-CM

## 2018-10-04 DIAGNOSIS — L57.0 ACTINIC KERATOSIS: ICD-10-CM

## 2018-10-04 DIAGNOSIS — D22 MELANOCYTIC NEVI: ICD-10-CM

## 2018-10-04 PROBLEM — D22.71 MELANOCYTIC NEVI OF RIGHT LOWER LIMB, INCLUDING HIP: Status: ACTIVE | Noted: 2018-10-04

## 2018-10-04 PROBLEM — D22.5 MELANOCYTIC NEVI OF TRUNK: Status: ACTIVE | Noted: 2018-10-04

## 2018-10-04 PROBLEM — D18.01 HEMANGIOMA OF SKIN AND SUBCUTANEOUS TISSUE: Status: ACTIVE | Noted: 2018-10-04

## 2018-10-04 PROBLEM — D22.72 MELANOCYTIC NEVI OF LEFT LOWER LIMB, INCLUDING HIP: Status: ACTIVE | Noted: 2018-10-04

## 2018-10-04 PROBLEM — D22.61 MELANOCYTIC NEVI OF RIGHT UPPER LIMB, INCLUDING SHOULDER: Status: ACTIVE | Noted: 2018-10-04

## 2018-10-04 PROBLEM — D22.62 MELANOCYTIC NEVI OF LEFT UPPER LIMB, INCLUDING SHOULDER: Status: ACTIVE | Noted: 2018-10-04

## 2018-10-04 PROCEDURE — ? LIQUID NITROGEN

## 2018-10-04 PROCEDURE — ? OBSERVATION

## 2018-10-04 PROCEDURE — 99214 OFFICE O/P EST MOD 30 MIN: CPT | Mod: 25

## 2018-10-04 PROCEDURE — 17004 DESTROY PREMAL LESIONS 15/>: CPT

## 2018-10-04 PROCEDURE — ? COUNSELING

## 2018-10-04 ASSESSMENT — LOCATION SIMPLE DESCRIPTION DERM
LOCATION SIMPLE: RIGHT LOWER BACK
LOCATION SIMPLE: ABDOMEN
LOCATION SIMPLE: RIGHT ZYGOMA
LOCATION SIMPLE: LEFT FOREARM
LOCATION SIMPLE: POSTERIOR SCALP
LOCATION SIMPLE: LEFT CALF
LOCATION SIMPLE: LEFT HAND
LOCATION SIMPLE: LEFT POPLITEAL SKIN
LOCATION SIMPLE: RIGHT UPPER ARM
LOCATION SIMPLE: RIGHT FOREARM
LOCATION SIMPLE: LEFT FOREARM
LOCATION SIMPLE: RIGHT SCALP
LOCATION SIMPLE: LEFT FOREHEAD
LOCATION SIMPLE: LEFT OCCIPITAL SCALP
LOCATION SIMPLE: RIGHT TEMPLE
LOCATION SIMPLE: RIGHT CALF
LOCATION SIMPLE: CHEST
LOCATION SIMPLE: RIGHT UPPER BACK
LOCATION SIMPLE: RIGHT HAND
LOCATION SIMPLE: SCALP
LOCATION SIMPLE: LEFT UPPER ARM
LOCATION SIMPLE: RIGHT EAR
LOCATION SIMPLE: LEFT EAR
LOCATION SIMPLE: RIGHT POPLITEAL SKIN

## 2018-10-04 ASSESSMENT — LOCATION DETAILED DESCRIPTION DERM
LOCATION DETAILED: RIGHT INFERIOR UPPER BACK
LOCATION DETAILED: LEFT VENTRAL DISTAL FOREARM
LOCATION DETAILED: LEFT PROXIMAL CALF
LOCATION DETAILED: RIGHT INFERIOR MEDIAL MIDBACK
LOCATION DETAILED: LEFT ANTERIOR DISTAL UPPER ARM
LOCATION DETAILED: RIGHT SUPERIOR PARIETAL SCALP
LOCATION DETAILED: LEFT SUPERIOR CRUS OF ANTIHELIX
LOCATION DETAILED: LEFT MEDIAL FOREHEAD
LOCATION DETAILED: LEFT MEDIAL SUPERIOR CHEST
LOCATION DETAILED: RIGHT POPLITEAL SKIN
LOCATION DETAILED: RIGHT MEDIAL UPPER BACK
LOCATION DETAILED: RIGHT SUPERIOR FRONTAL SCALP
LOCATION DETAILED: LEFT INFERIOR HELIX
LOCATION DETAILED: LEFT SUPERIOR LATERAL FOREHEAD
LOCATION DETAILED: RIGHT CENTRAL ZYGOMA
LOCATION DETAILED: RIGHT MEDIAL INFERIOR CHEST
LOCATION DETAILED: RIGHT SUPERIOR HELIX
LOCATION DETAILED: EPIGASTRIC SKIN
LOCATION DETAILED: LEFT VENTRAL DISTAL FOREARM
LOCATION DETAILED: LEFT POPLITEAL SKIN
LOCATION DETAILED: XIPHOID
LOCATION DETAILED: LEFT SUPERIOR FRONTAL SCALP
LOCATION DETAILED: RIGHT PROXIMAL CALF
LOCATION DETAILED: RIGHT RADIAL DORSAL HAND
LOCATION DETAILED: RIGHT CENTRAL TEMPLE
LOCATION DETAILED: MID-OCCIPITAL SCALP
LOCATION DETAILED: LEFT INFERIOR FOREHEAD
LOCATION DETAILED: LEFT RADIAL DORSAL HAND
LOCATION DETAILED: RIGHT ANTERIOR DISTAL UPPER ARM
LOCATION DETAILED: RIGHT VENTRAL DISTAL FOREARM
LOCATION DETAILED: LEFT SUPERIOR OCCIPITAL SCALP
LOCATION DETAILED: LEFT SUPERIOR HELIX
LOCATION DETAILED: LEFT SUPERIOR PARIETAL SCALP
LOCATION DETAILED: RIGHT MEDIAL FRONTAL SCALP
LOCATION DETAILED: LEFT SCAPHA

## 2018-10-04 ASSESSMENT — LOCATION ZONE DERM
LOCATION ZONE: FACE
LOCATION ZONE: ARM
LOCATION ZONE: SCALP
LOCATION ZONE: EAR
LOCATION ZONE: TRUNK
LOCATION ZONE: LEG
LOCATION ZONE: ARM
LOCATION ZONE: HAND

## 2018-10-05 ENCOUNTER — HOSPITAL ENCOUNTER (OUTPATIENT)
Dept: RADIOLOGY | Facility: MEDICAL CENTER | Age: 73
End: 2018-10-05
Attending: INTERNAL MEDICINE
Payer: MEDICARE

## 2018-10-05 DIAGNOSIS — R91.1 LUNG NODULE: ICD-10-CM

## 2018-10-05 PROCEDURE — 71260 CT THORAX DX C+: CPT

## 2018-10-05 PROCEDURE — 700117 HCHG RX CONTRAST REV CODE 255: Performed by: INTERNAL MEDICINE

## 2018-10-05 RX ADMIN — IOHEXOL 75 ML: 350 INJECTION, SOLUTION INTRAVENOUS at 08:38

## 2018-10-05 NOTE — PROGRESS NOTES
CT lung with contrast on 10/5/18 showed stable lungs nodules measuring up to 6 mm. No new nodules. Patient was informed via my chart. I will discuss the result again in upcoming appointment on 10/22/18.     Joann Wakefield MD

## 2018-10-22 ENCOUNTER — OFFICE VISIT (OUTPATIENT)
Dept: MEDICAL GROUP | Age: 73
End: 2018-10-22
Payer: MEDICARE

## 2018-10-22 VITALS
OXYGEN SATURATION: 92 % | HEIGHT: 70 IN | WEIGHT: 263.6 LBS | TEMPERATURE: 98.7 F | HEART RATE: 63 BPM | BODY MASS INDEX: 37.74 KG/M2 | SYSTOLIC BLOOD PRESSURE: 138 MMHG | DIASTOLIC BLOOD PRESSURE: 82 MMHG

## 2018-10-22 DIAGNOSIS — R91.1 LUNG NODULE: ICD-10-CM

## 2018-10-22 DIAGNOSIS — E78.5 HYPERLIPIDEMIA LDL GOAL <100: ICD-10-CM

## 2018-10-22 DIAGNOSIS — N40.1 BENIGN PROSTATIC HYPERPLASIA WITH LOWER URINARY TRACT SYMPTOMS, SYMPTOM DETAILS UNSPECIFIED: ICD-10-CM

## 2018-10-22 DIAGNOSIS — E03.4 HYPOTHYROIDISM DUE TO ACQUIRED ATROPHY OF THYROID: ICD-10-CM

## 2018-10-22 DIAGNOSIS — Z23 NEED FOR SHINGLES VACCINE: ICD-10-CM

## 2018-10-22 PROCEDURE — 99214 OFFICE O/P EST MOD 30 MIN: CPT | Performed by: INTERNAL MEDICINE

## 2018-10-22 ASSESSMENT — PATIENT HEALTH QUESTIONNAIRE - PHQ9: CLINICAL INTERPRETATION OF PHQ2 SCORE: 0

## 2018-10-22 NOTE — PROGRESS NOTES
Subjective:   Dewayne Moon is a 73 y.o. male here today for evaluation and management of:      Lung nodule  Patient has lung nodule.  He is a former smoker and he reported that he smoked for short period of time.  He quit smoking on 1/20/1985.  He found to have small nodules on right lung and the largest one is measuring up to 6 mm in previous CT chest on October 26, 2017.  He has repeated CT scan done on 10/5/18 showed stable lung nodules measuring up to 6 mm and no new suspicious lesion or no new nodule was found.  He has low lung volumes with elevation of the left hemidiaphragm, linear opacity in the left lingula and left lower lobe, atelectasis or scarring.  I reviewed his CT chest with contrast report with him in clinic today.  Since his lung nodules are stable, we discussed to stop doing CT scan for follow-up on lung nodules unless he has any new respiratory symptoms.  Patient agreed with the plan.    Hypothyroidism due to acquired atrophy of thyroid  Patient is taking Synthroid 100 mcg every morning.  He denies side effects from taking it.  He is in euthyroid state clinically.  His thyroid function test is within normal.  I discussed the lab results with him in clinic today.    Results for DEWAYNE MOON (MRN [id number]) as of 10/22/2018 08:33   Ref. Range 10/2/2018 09:46   TSH Latest Ref Range: 0.380 - 5.330 uIU/mL 2.720   Free T-4 Latest Ref Range: 0.53 - 1.43 ng/dL 0.93       Hyperlipidemia LDL goal <100  Patient stated that he is taking atorvastatin 20 mg every evening for many years.  He denies side effects from taking it.  His liver enzymes are stable and normal.  His cholesterol is stable and well controlled.  I discussed the blood test result with him in clinic today.  He agreed to continue to take atorvastatin with the same dose.  Recent CT lungs on 10/5/18 showed moderate amount of calcified lesion in coronary artery.  I discussed with patient to continue atorvastatin, limit sodium intake,  "healthy lifestyle modification with healthy diet and regular physical exercise to lose weight.    Results for DEWAYNE VILLEGAS (MRN [id number]) as of 10/22/2018 08:33   Ref. Range 10/2/2018 09:46   Cholesterol,Tot Latest Ref Range: 100 - 199 mg/dL 127   Triglycerides Latest Ref Range: 0 - 149 mg/dL 117   HDL Latest Ref Range: >=40 mg/dL 41   LDL Latest Ref Range: <100 mg/dL 63       BPH (benign prostatic hyperplasia)  Patient reported that he does not have nocturia.  He still has weak urine flow.  However he does not want to take any medication currently as he is able to manage by himself.  Patient declined to take prescription medication for benign prostatic hyperplasia with weak urine flow.         Current medicines (including changes today)  Current Outpatient Prescriptions   Medication Sig Dispense Refill   • Zoster Vac Recomb Adjuvanted (SHINGRIX) 50 MCG Recon Susp 0.5 mL by Intramuscular route Once for 1 dose. 0.5 mL 0   • SYNTHROID 100 MCG Tab TAKE 1 TABLET EVERY MORNINGON AN EMPTY STOMACH 90 Tab 3   • atorvastatin (LIPITOR) 20 MG Tab Take 1 Tab by mouth every bedtime. 90 Tab 3   • triamcinolone acetonide (KENALOG) 0.1 % Cream Apply  to affected area(s) 2 times a day.     • bimatoprost (LUMIGAN) 0.01 % Solution Place 1 Drop in both eyes every bedtime.       No current facility-administered medications for this visit.      He  has a past medical history of BPH (benign prostatic hyperplasia); Hyperlipidemia; and Thyroid disease.    ROS   No chest pain, no shortness of breath, no abdominal pain       Objective:     Blood pressure 138/82, pulse 63, temperature 37.1 °C (98.7 °F), temperature source Temporal, height 1.778 m (5' 10\"), weight 119.6 kg (263 lb 9.6 oz), SpO2 92 %. Body mass index is 37.82 kg/m².   Physical Exam:  General: Alert, oriented and no acute distress.  Eye contact is good, speech goal directed, affect calm  HEENT: conjunctiva non-injected, sclera non-icteric.  Oral mucous membranes pink and " moist with no lesions.  Pinna normal.   Lungs: Normal respiratory effort, clear to auscultation bilaterally with good excursion.  CV: regular rate and rhythm. No murmurs.   Abdomen: soft, non distended, nontender, Bowel sound normal.  Ext: no edema, color normal, vascularity normal, temperature normal        Assessment and Plan:   The following treatment plan was discussed     1. Hypothyroidism due to acquired atrophy of thyroid  - Well-controlled. Continue current regimens, Synthroid 100 mcg every morning on empty stomach.. Recheck lab 1-2 weeks before next follow up visit.  - CBC WITH DIFFERENTIAL; Future  - TSH; Future  - FREE THYROXINE; Future    2. Hyperlipidemia LDL goal <100  - Well-controlled. Continue current regimens, atorvastatin 20 mg every evening. Recheck lab 1-2 weeks before next follow up visit.  - Reviewed the risks and benefits of treatment and potential side effects of medication.  - Advised to eat low fat, low carbohydrate and high fiber diet as well as do cardio physical exercise regularly.  - COMP METABOLIC PANEL; Future  - LIPID PROFILE; Future    3. Benign prostatic hyperplasia with lower urinary tract symptoms, symptom details unspecified  - Not on medication.  Patient declined to take medication.  He states that he is able to manage without medication treatment.  Continue to monitor.  Patient is advised to follow-up with his urologist regularly.  Patient is advised to seek urgent medical attention if he has worsening urinary symptoms or signs or symptoms of urinary retention.  - CBC WITH DIFFERENTIAL; Future  - COMP METABOLIC PANEL; Future    4. Lung nodule  - I reviewed repeated CT scan report done on 10/5/18 with patient in clinic today.  He has stable lung nodules, the largest one is measuring up to 6 mm.  We discussed to stop doing CT scans since he has stable lung nodule in 12 months follow-up.  Patient has low volume in the left lung.  Patient is advised to do regular deep breathing  exercise.  - I encourage patient to have lifestyle modification with healthy diet and do regular physical exercise to lose weight.  Patient is advised to keep quitting smoking.    5. Need for shingles vaccine  - Shingrix vaccine was prescribed today after reviewing risks and benefits as well as side effects of vaccine.  - Zoster Vac Recomb Adjuvanted (SHINGRIX) 50 MCG Recon Susp; 0.5 mL by Intramuscular route Once for 1 dose.  Dispense: 0.5 mL; Refill: 0        Followup: Return in about 6 months (around 4/22/2019), or if symptoms worsen or fail to improve, for Hyperlipidemia, Hypothyroid, Obesity, BPH, Lab review.      Please note that this dictation was created using voice recognition software. I have made every reasonable attempt to correct obvious errors, but I expect that there may have unintended errors in text, spelling, punctuation, or grammar that I did not discover.

## 2018-10-22 NOTE — ASSESSMENT & PLAN NOTE
Patient has lung nodule.  He is a former smoker and he reported that he smoked for short period of time.  He quit smoking on 1/20/1985.  He found to have small nodules on right lung and the largest one is measuring up to 6 mm in previous CT chest on October 26, 2017.  He has repeated CT scan done on 10/5/18 showed stable lung nodules measuring up to 6 mm and no new suspicious lesion or no new nodule was found.  He has low lung volumes with elevation of the left hemidiaphragm, linear opacity in the left lingula and left lower lobe, atelectasis or scarring.  I reviewed his CT chest with contrast report with him in clinic today.  Since his lung nodules are stable, we discussed to stop doing CT scan for follow-up on lung nodules unless he has any new respiratory symptoms.  Patient agreed with the plan.

## 2018-10-22 NOTE — ASSESSMENT & PLAN NOTE
Patient reported that he does not have nocturia.  He still has weak urine flow.  However he does not want to take any medication currently as he is able to manage by himself.  Patient declined to take prescription medication for benign prostatic hyperplasia with weak urine flow.

## 2018-10-22 NOTE — ASSESSMENT & PLAN NOTE
Patient stated that he is taking atorvastatin 20 mg every evening for many years.  He denies side effects from taking it.  His liver enzymes are stable and normal.  His cholesterol is stable and well controlled.  I discussed the blood test result with him in clinic today.  He agreed to continue to take atorvastatin with the same dose.  Recent CT lungs on 10/5/18 showed moderate amount of calcified lesion in coronary artery.  I discussed with patient to continue atorvastatin, limit sodium intake, healthy lifestyle modification with healthy diet and regular physical exercise to lose weight.    Results for DEWAYNE VILLEGAS (MRN [id number]) as of 10/22/2018 08:33   Ref. Range 10/2/2018 09:46   Cholesterol,Tot Latest Ref Range: 100 - 199 mg/dL 127   Triglycerides Latest Ref Range: 0 - 149 mg/dL 117   HDL Latest Ref Range: >=40 mg/dL 41   LDL Latest Ref Range: <100 mg/dL 63

## 2018-10-22 NOTE — ASSESSMENT & PLAN NOTE
Patient is taking Synthroid 100 mcg every morning.  He denies side effects from taking it.  He is in euthyroid state clinically.  His thyroid function test is within normal.  I discussed the lab results with him in clinic today.    Results for DEWAYNE VILLEGAS ACOSTA (MRN [id number]) as of 10/22/2018 08:33   Ref. Range 10/2/2018 09:46   TSH Latest Ref Range: 0.380 - 5.330 uIU/mL 2.720   Free T-4 Latest Ref Range: 0.53 - 1.43 ng/dL 0.93

## 2019-04-12 ENCOUNTER — APPOINTMENT (RX ONLY)
Dept: URBAN - METROPOLITAN AREA CLINIC 4 | Facility: CLINIC | Age: 74
Setting detail: DERMATOLOGY
End: 2019-04-12

## 2019-04-12 DIAGNOSIS — L81.4 OTHER MELANIN HYPERPIGMENTATION: ICD-10-CM

## 2019-04-12 DIAGNOSIS — Z85.820 PERSONAL HISTORY OF MALIGNANT MELANOMA OF SKIN: ICD-10-CM

## 2019-04-12 DIAGNOSIS — L82.1 OTHER SEBORRHEIC KERATOSIS: ICD-10-CM

## 2019-04-12 DIAGNOSIS — D22 MELANOCYTIC NEVI: ICD-10-CM

## 2019-04-12 DIAGNOSIS — D18.0 HEMANGIOMA: ICD-10-CM

## 2019-04-12 PROBLEM — D22.61 MELANOCYTIC NEVI OF RIGHT UPPER LIMB, INCLUDING SHOULDER: Status: ACTIVE | Noted: 2019-04-12

## 2019-04-12 PROBLEM — D22.62 MELANOCYTIC NEVI OF LEFT UPPER LIMB, INCLUDING SHOULDER: Status: ACTIVE | Noted: 2019-04-12

## 2019-04-12 PROBLEM — D22.72 MELANOCYTIC NEVI OF LEFT LOWER LIMB, INCLUDING HIP: Status: ACTIVE | Noted: 2019-04-12

## 2019-04-12 PROBLEM — D18.01 HEMANGIOMA OF SKIN AND SUBCUTANEOUS TISSUE: Status: ACTIVE | Noted: 2019-04-12

## 2019-04-12 PROBLEM — D22.5 MELANOCYTIC NEVI OF TRUNK: Status: ACTIVE | Noted: 2019-04-12

## 2019-04-12 PROBLEM — D48.5 NEOPLASM OF UNCERTAIN BEHAVIOR OF SKIN: Status: ACTIVE | Noted: 2019-04-12

## 2019-04-12 PROBLEM — D22.71 MELANOCYTIC NEVI OF RIGHT LOWER LIMB, INCLUDING HIP: Status: ACTIVE | Noted: 2019-04-12

## 2019-04-12 PROCEDURE — 11102 TANGNTL BX SKIN SINGLE LES: CPT

## 2019-04-12 PROCEDURE — ? BIOPSY BY SHAVE METHOD

## 2019-04-12 PROCEDURE — ? COUNSELING

## 2019-04-12 PROCEDURE — ? OBSERVATION

## 2019-04-12 PROCEDURE — 99214 OFFICE O/P EST MOD 30 MIN: CPT | Mod: 25

## 2019-04-12 ASSESSMENT — LOCATION DETAILED DESCRIPTION DERM
LOCATION DETAILED: RIGHT SUPERIOR HELIX
LOCATION DETAILED: EPIGASTRIC SKIN
LOCATION DETAILED: RIGHT ANTERIOR PROXIMAL UPPER ARM
LOCATION DETAILED: RIGHT MEDIAL SUPERIOR CHEST
LOCATION DETAILED: RIGHT INFERIOR MEDIAL MIDBACK
LOCATION DETAILED: RIGHT PROXIMAL CALF
LOCATION DETAILED: LEFT PROXIMAL CALF
LOCATION DETAILED: LEFT VENTRAL DISTAL FOREARM
LOCATION DETAILED: RIGHT VENTRAL PROXIMAL FOREARM
LOCATION DETAILED: LEFT RADIAL DORSAL HAND
LOCATION DETAILED: LEFT POPLITEAL SKIN
LOCATION DETAILED: LEFT SCAPHA
LOCATION DETAILED: LEFT MEDIAL FOREHEAD
LOCATION DETAILED: RIGHT RADIAL DORSAL HAND
LOCATION DETAILED: XIPHOID
LOCATION DETAILED: RIGHT DISTAL POSTERIOR THIGH
LOCATION DETAILED: LEFT ANTECUBITAL SKIN
LOCATION DETAILED: LEFT DISTAL POSTERIOR THIGH
LOCATION DETAILED: RIGHT VENTRAL DISTAL FOREARM
LOCATION DETAILED: RIGHT MEDIAL INFERIOR CHEST
LOCATION DETAILED: LEFT VENTRAL DISTAL FOREARM
LOCATION DETAILED: RIGHT INFERIOR UPPER BACK
LOCATION DETAILED: RIGHT MEDIAL UPPER BACK
LOCATION DETAILED: LEFT ANTERIOR DISTAL UPPER ARM
LOCATION DETAILED: LEFT MEDIAL SUPERIOR CHEST
LOCATION DETAILED: RIGHT POPLITEAL SKIN
LOCATION DETAILED: PERIUMBILICAL SKIN
LOCATION DETAILED: LEFT SUPERIOR MEDIAL MIDBACK
LOCATION DETAILED: RIGHT ANTERIOR DISTAL UPPER ARM
LOCATION DETAILED: RIGHT SUPERIOR PARIETAL SCALP

## 2019-04-12 ASSESSMENT — LOCATION ZONE DERM
LOCATION ZONE: ARM
LOCATION ZONE: ARM
LOCATION ZONE: SCALP
LOCATION ZONE: EAR
LOCATION ZONE: TRUNK
LOCATION ZONE: LEG
LOCATION ZONE: FACE
LOCATION ZONE: HAND

## 2019-04-12 ASSESSMENT — LOCATION SIMPLE DESCRIPTION DERM
LOCATION SIMPLE: CHEST
LOCATION SIMPLE: RIGHT POPLITEAL SKIN
LOCATION SIMPLE: LEFT FOREARM
LOCATION SIMPLE: RIGHT UPPER BACK
LOCATION SIMPLE: RIGHT LOWER BACK
LOCATION SIMPLE: RIGHT CALF
LOCATION SIMPLE: RIGHT POSTERIOR THIGH
LOCATION SIMPLE: LEFT EAR
LOCATION SIMPLE: ABDOMEN
LOCATION SIMPLE: RIGHT UPPER ARM
LOCATION SIMPLE: RIGHT EAR
LOCATION SIMPLE: SCALP
LOCATION SIMPLE: LEFT POSTERIOR THIGH
LOCATION SIMPLE: LEFT HAND
LOCATION SIMPLE: LEFT CALF
LOCATION SIMPLE: LEFT FOREHEAD
LOCATION SIMPLE: LEFT ELBOW
LOCATION SIMPLE: LEFT UPPER ARM
LOCATION SIMPLE: RIGHT FOREARM
LOCATION SIMPLE: LEFT POPLITEAL SKIN
LOCATION SIMPLE: LEFT LOWER BACK
LOCATION SIMPLE: RIGHT HAND
LOCATION SIMPLE: LEFT FOREARM

## 2019-04-12 NOTE — PROCEDURE: BIOPSY BY SHAVE METHOD
Wound Care: Vaseline
Lab Facility: 
Render Post-Care Instructions In Note?: no
Size Of Lesion In Cm: 1.1
Anesthesia Type: 1% lidocaine with epinephrine
Additional Anesthesia Volume In Cc (Will Not Render If 0): 0
Cryotherapy Text: The wound bed was treated with cryotherapy after the biopsy was performed.
Type Of Destruction Used: Electrodesiccation
Billing Type: Third-Party Bill
Anesthesia Volume In Cc: 0.5
Depth Of Biopsy: dermis
Biopsy Type: H and E
Electrodesiccation Text: The wound bed was treated with electrodesiccation after the biopsy was performed.
Was A Bandage Applied: Yes
Silver Nitrate Text: The wound bed was treated with silver nitrate after the biopsy was performed.
Hemostasis: Electrocautery
Electrodesiccation And Curettage Text: The wound bed was treated with electrodesiccation and curettage after the biopsy was performed.
Dressing: bandage
Post-Care Instructions: I reviewed with the patient in detail post-care instructions. Patient is to keep the biopsy site dry overnight, and then apply bacitracin twice daily until healed. Patient may apply hydrogen peroxide soaks to remove any crusting.
Curettage Text: The wound bed was treated with curettage after the biopsy was performed.
Lab: [id number]
Consent: Written consent was obtained and risks were reviewed including but not limited to scarring, infection, bleeding, scabbing, incomplete removal, nerve damage and allergy to anesthesia.
Notification Instructions: Patient will be notified of biopsy results. However, patient instructed to call the office if not contacted within 2 weeks.
Biopsy Method: Personna blade
Detail Level: Detailed

## 2019-04-19 ENCOUNTER — HOSPITAL ENCOUNTER (OUTPATIENT)
Dept: LAB | Facility: MEDICAL CENTER | Age: 74
End: 2019-04-19
Attending: INTERNAL MEDICINE
Payer: MEDICARE

## 2019-04-19 DIAGNOSIS — N40.1 BENIGN PROSTATIC HYPERPLASIA WITH LOWER URINARY TRACT SYMPTOMS, SYMPTOM DETAILS UNSPECIFIED: ICD-10-CM

## 2019-04-19 DIAGNOSIS — E78.5 HYPERLIPIDEMIA LDL GOAL <100: ICD-10-CM

## 2019-04-19 DIAGNOSIS — E03.4 HYPOTHYROIDISM DUE TO ACQUIRED ATROPHY OF THYROID: ICD-10-CM

## 2019-04-19 LAB
ALBUMIN SERPL BCP-MCNC: 4 G/DL (ref 3.2–4.9)
ALBUMIN/GLOB SERPL: 1.4 G/DL
ALP SERPL-CCNC: 85 U/L (ref 30–99)
ALT SERPL-CCNC: 23 U/L (ref 2–50)
ANION GAP SERPL CALC-SCNC: 11 MMOL/L (ref 0–11.9)
AST SERPL-CCNC: 22 U/L (ref 12–45)
BASOPHILS # BLD AUTO: 0.5 % (ref 0–1.8)
BASOPHILS # BLD: 0.02 K/UL (ref 0–0.12)
BILIRUB SERPL-MCNC: 0.9 MG/DL (ref 0.1–1.5)
BUN SERPL-MCNC: 20 MG/DL (ref 8–22)
CALCIUM SERPL-MCNC: 9 MG/DL (ref 8.5–10.5)
CHLORIDE SERPL-SCNC: 105 MMOL/L (ref 96–112)
CHOLEST SERPL-MCNC: 126 MG/DL (ref 100–199)
CO2 SERPL-SCNC: 26 MMOL/L (ref 20–33)
CREAT SERPL-MCNC: 1.02 MG/DL (ref 0.5–1.4)
EOSINOPHIL # BLD AUTO: 0.08 K/UL (ref 0–0.51)
EOSINOPHIL NFR BLD: 2 % (ref 0–6.9)
ERYTHROCYTE [DISTWIDTH] IN BLOOD BY AUTOMATED COUNT: 45 FL (ref 35.9–50)
FASTING STATUS PATIENT QL REPORTED: NORMAL
GLOBULIN SER CALC-MCNC: 2.9 G/DL (ref 1.9–3.5)
GLUCOSE SERPL-MCNC: 97 MG/DL (ref 65–99)
HCT VFR BLD AUTO: 49.3 % (ref 42–52)
HDLC SERPL-MCNC: 40 MG/DL
HGB BLD-MCNC: 16.2 G/DL (ref 14–18)
IMM GRANULOCYTES # BLD AUTO: 0.01 K/UL (ref 0–0.11)
IMM GRANULOCYTES NFR BLD AUTO: 0.3 % (ref 0–0.9)
LDLC SERPL CALC-MCNC: 60 MG/DL
LYMPHOCYTES # BLD AUTO: 1.22 K/UL (ref 1–4.8)
LYMPHOCYTES NFR BLD: 30.5 % (ref 22–41)
MCH RBC QN AUTO: 30.9 PG (ref 27–33)
MCHC RBC AUTO-ENTMCNC: 32.9 G/DL (ref 33.7–35.3)
MCV RBC AUTO: 93.9 FL (ref 81.4–97.8)
MONOCYTES # BLD AUTO: 0.43 K/UL (ref 0–0.85)
MONOCYTES NFR BLD AUTO: 10.8 % (ref 0–13.4)
NEUTROPHILS # BLD AUTO: 2.24 K/UL (ref 1.82–7.42)
NEUTROPHILS NFR BLD: 55.9 % (ref 44–72)
NRBC # BLD AUTO: 0 K/UL
NRBC BLD-RTO: 0 /100 WBC
PLATELET # BLD AUTO: 153 K/UL (ref 164–446)
PMV BLD AUTO: 10 FL (ref 9–12.9)
POTASSIUM SERPL-SCNC: 4.4 MMOL/L (ref 3.6–5.5)
PROT SERPL-MCNC: 6.9 G/DL (ref 6–8.2)
RBC # BLD AUTO: 5.25 M/UL (ref 4.7–6.1)
SODIUM SERPL-SCNC: 142 MMOL/L (ref 135–145)
T4 FREE SERPL-MCNC: 0.93 NG/DL (ref 0.53–1.43)
TRIGL SERPL-MCNC: 132 MG/DL (ref 0–149)
TSH SERPL DL<=0.005 MIU/L-ACNC: 4.16 UIU/ML (ref 0.38–5.33)
WBC # BLD AUTO: 4 K/UL (ref 4.8–10.8)

## 2019-04-19 PROCEDURE — 80053 COMPREHEN METABOLIC PANEL: CPT

## 2019-04-19 PROCEDURE — 85025 COMPLETE CBC W/AUTO DIFF WBC: CPT

## 2019-04-19 PROCEDURE — 84439 ASSAY OF FREE THYROXINE: CPT

## 2019-04-19 PROCEDURE — 80061 LIPID PANEL: CPT

## 2019-04-19 PROCEDURE — 36415 COLL VENOUS BLD VENIPUNCTURE: CPT

## 2019-04-19 PROCEDURE — 84443 ASSAY THYROID STIM HORMONE: CPT

## 2019-04-25 ENCOUNTER — OFFICE VISIT (OUTPATIENT)
Dept: MEDICAL GROUP | Age: 74
End: 2019-04-25
Payer: MEDICARE

## 2019-04-25 VITALS
TEMPERATURE: 98.6 F | BODY MASS INDEX: 38.39 KG/M2 | WEIGHT: 268.2 LBS | SYSTOLIC BLOOD PRESSURE: 114 MMHG | DIASTOLIC BLOOD PRESSURE: 82 MMHG | OXYGEN SATURATION: 92 % | HEIGHT: 70 IN | HEART RATE: 62 BPM

## 2019-04-25 DIAGNOSIS — E03.4 HYPOTHYROIDISM DUE TO ACQUIRED ATROPHY OF THYROID: ICD-10-CM

## 2019-04-25 DIAGNOSIS — N40.1 BENIGN PROSTATIC HYPERPLASIA WITH LOWER URINARY TRACT SYMPTOMS, SYMPTOM DETAILS UNSPECIFIED: ICD-10-CM

## 2019-04-25 DIAGNOSIS — G89.29 CHRONIC LEFT HIP PAIN: ICD-10-CM

## 2019-04-25 DIAGNOSIS — R91.1 LUNG NODULE: ICD-10-CM

## 2019-04-25 DIAGNOSIS — Z91.81 RISK FOR FALLS: ICD-10-CM

## 2019-04-25 DIAGNOSIS — E78.5 HYPERLIPIDEMIA LDL GOAL <100: ICD-10-CM

## 2019-04-25 DIAGNOSIS — M25.552 CHRONIC LEFT HIP PAIN: ICD-10-CM

## 2019-04-25 PROCEDURE — 99214 OFFICE O/P EST MOD 30 MIN: CPT | Performed by: INTERNAL MEDICINE

## 2019-04-25 RX ORDER — ATORVASTATIN CALCIUM 20 MG/1
20 TABLET, FILM COATED ORAL
Qty: 90 TAB | Refills: 3 | Status: SHIPPED | OUTPATIENT
Start: 2019-04-25 | End: 2020-02-28 | Stop reason: SDUPTHER

## 2019-04-25 ASSESSMENT — PATIENT HEALTH QUESTIONNAIRE - PHQ9: CLINICAL INTERPRETATION OF PHQ2 SCORE: 0

## 2019-04-25 ASSESSMENT — PAIN SCALES - GENERAL: PAINLEVEL: NO PAIN

## 2019-04-25 ASSESSMENT — ACTIVITIES OF DAILY LIVING (ADL): BATHING_REQUIRES_ASSISTANCE: 0

## 2019-04-25 ASSESSMENT — ENCOUNTER SYMPTOMS: GENERAL WELL-BEING: EXCELLENT

## 2019-04-25 NOTE — PROGRESS NOTES
Subjective:   Dewayne Moon is a 73 y.o. male here today for evaluation and management of:    Hypothyroidism due to acquired atrophy of thyroid  Patient is taking Synthroid 100 mcg every morning on an empty stomach. He denies side effects from taking it such as temperature intolerance or constipation. He does not need any refills at this time and will continue taking Synthroid 100 mcg. I discussed the lab results with him in clinic today showing that his thyroid function test is within normal limits.     Results for DEWAYNE MOON (MRN [id number]) as of 4/25/2019 08:23   Ref. Range 4/19/2019 08:23   TSH Latest Ref Range: 0.380 - 5.330 uIU/mL 4.160   Free T-4 Latest Ref Range: 0.53 - 1.43 ng/dL 0.93       Hyperlipidemia LDL goal <100  Cholesterol is stable and well controlled. Patient is taking Atorvastatin 20 mg every morning without side effects. He agrees to continue to take Atorvastatin with the same dose.     Results for DEWAYNE MOON (MRN [id number]) as of 4/25/2019 08:23   Ref. Range 4/19/2019 08:23   Cholesterol,Tot Latest Ref Range: 100 - 199 mg/dL 126   Triglycerides Latest Ref Range: 0 - 149 mg/dL 132   HDL Latest Ref Range: >=40 mg/dL 40   LDL Latest Ref Range: <100 mg/dL 60       Benign prostatic hyperplasia with lower urinary tract symptoms, symptom details unspecified  Stable and controlled with Saw Palmetto 600 mg once a day. He states that he only has to get up to use the restroom once a night with this medication and multiple times without this medication. Denies side effects with this medication. Denies nocturia.     Lung nodule  Patient has lung nodule. Stable. I discussed the CT scan with the patient showing that he was found to have small nodules on the right lung, the largest one measuring up to 6 mm. Since his lung nodules are stable, we discussed to stop doing CT scan for follow-up on lung nodules unless he has any new respiratory symptoms. Patient agreed with the plan.  "    Chronic left hip pain  Risk for falls  Patient states that he fell recently while working in the yard. He did not injure himself or hit his head. Patient states, \"I fall all the time.\" He does not feel this is a chronic problem however he reports injuring his left hip 15 years ago and since has had intermittent hip pain, starting to bother him more recently. He describes his pain as sharp and achy. His pain is starting to radiate down to his left knee. He is unable to stand on one foot for a long period of times. He has already been seen by orthopedist and had imaging done showing normal x-ray and no hip arthritis. He would be interested in physiatry with Dr. Ponce. Denies cardiac issues such as chest pain, palpitation, dizziness or syncopal episode that caused fall.        Current medicines (including changes today)  Current Outpatient Prescriptions   Medication Sig Dispense Refill   • SYNTHROID 100 MCG Tab TAKE 1 TABLET EVERY MORNINGON AN EMPTY STOMACH 90 Tab 3   • atorvastatin (LIPITOR) 20 MG Tab Take 1 Tab by mouth every bedtime. 90 Tab 3   • bimatoprost (LUMIGAN) 0.01 % Solution Place 1 Drop in both eyes every bedtime.     • triamcinolone acetonide (KENALOG) 0.1 % Cream Apply  to affected area(s) 2 times a day.       No current facility-administered medications for this visit.      He  has a past medical history of BPH (benign prostatic hyperplasia); Hyperlipidemia; and Thyroid disease.    ROS   No chest pain, no shortness of breath, no abdominal pain  No nocturia, no constipation  No temperature intolerance     Objective:     /82 (BP Location: Right arm, Patient Position: Sitting, BP Cuff Size: Adult long)   Pulse 62   Temp 37 °C (98.6 °F) (Temporal)   Ht 1.778 m (5' 10\")   Wt 121.7 kg (268 lb 3.2 oz)   SpO2 92%  Body mass index is 38.48 kg/m².     Physical Exam:  General: Alert, oriented and no acute distress.  Eye contact is good, speech goal directed, affect calm  HEENT: conjunctiva " non-injected, sclera non-icteric.  Oral mucous membranes pink and moist with no lesions.  Pinna normal. TM pearly gray.   Neck No supraclavicular, submandibular, submental lymphadenopathy or masses in the neck or supraclavicular regions.  Lungs: Normal respiratory effort, clear to auscultation bilaterally with good excursion.  CV: regular rate and rhythm. No murmurs. No carotid bruits.  Abdomen: soft, non distended, nontender, No CVAT, Bowel sound normal.  Ext: no edema, color normal, vascularity normal, temperature normal      Assessment and Plan:   The following treatment plan was discussed     1. Hypothyroidism due to acquired atrophy of thyroid  - Well-controlled. Continue current regimens, Synthroid 100 mcg every morning on an empty stomach. Recheck lab 1-2 weeks before next follow up visit.  - TSH; Future  - FREE THYROXINE; Future    2. Hyperlipidemia LDL goal <100  - Well-controlled. Continue current regimens, Atorvastatin 20 mg every evening. Recheck lab 1-2 weeks before next follow up visit.  - Reviewed the risks and benefits of treatment and potential side effects of medication.   - Advised to eat low fat, low carbohydrate and high fiber diet as well as do cardio physical exercise regularly.   - Comp Metabolic Panel; Future  - Lipid Profile; Future  - atorvastatin (LIPITOR) 20 MG Tab; Take 1 Tab by mouth every bedtime.  Dispense: 90 Tab; Refill: 3    3. Benign prostatic hyperplasia with lower urinary tract symptoms, symptom details unspecified  - Not on medication. Continue managing without medication.   - Continue to monitor.    4. Lung nodule  - I reviewed CT scan report with the patient in clinic. His lung nodules are stable in one-year follow-up CT scan.  He does not require to repeat CT scan.  - Continue current regimens.    5. Risk for falls  - Patient identified as fall risk.  Appropriate orders and counseling given.  - Counseling for use walker or a cane to support balance, getting up slowly, turn  on the light when he gets up at night, installing nightlight at home.  - Encouraged to do walking exercise to improve his balance and strength.  Patient declined to refer to physical therapy.    6. Chronic left hip pain  - Patient does not wish to try anti-inflammatory medication currently.  He declined to refer to physical therapy as well.  - Requesting a referral to physiatry with Dr. Ponce and will consider physical therapy in the future if pain is not resolved.   - REFERRAL TO PHYSIATRY (PMR)    7. Health Maintenance   - Patient received Shingrix vaccine on 4/16/2019.    Followup: Return in about 6 months (around 10/25/2019), or if symptoms worsen or fail to improve, for Hyperlipidemia, BPH, Hypothyroid, Lab review.      Please note that this dictation was created using voice recognition software. I have made every reasonable attempt to correct obvious errors, but I expect that there may have unintended errors in text, spelling, punctuation, or grammar that I did not discover.    I, Viktoriya Whitten (Que), am scribing for, and in the presence of, Joann Wakefield M.D..    Electronically signed by: Viktoriya Whitten (Que), 4/25/2019    I, Joann Wakefield M.D., personally performed the services described in this documentation, as scribed by Viktoriya Whitten in my presence, and it is both accurate and complete.

## 2019-05-15 ENCOUNTER — TELEPHONE (OUTPATIENT)
Dept: MEDICAL GROUP | Age: 74
End: 2019-05-15

## 2019-05-15 NOTE — TELEPHONE ENCOUNTER
· DMV Physical Eval Form/paperwork received. Has been signed by provider, scanned to pt media and returned to pt in office.

## 2019-05-17 ENCOUNTER — OFFICE VISIT (OUTPATIENT)
Dept: PHYSICAL MEDICINE AND REHAB | Facility: MEDICAL CENTER | Age: 74
End: 2019-05-17
Payer: MEDICARE

## 2019-05-17 VITALS
TEMPERATURE: 97.6 F | SYSTOLIC BLOOD PRESSURE: 130 MMHG | HEART RATE: 68 BPM | DIASTOLIC BLOOD PRESSURE: 62 MMHG | OXYGEN SATURATION: 92 % | WEIGHT: 270.73 LBS | HEIGHT: 70 IN | BODY MASS INDEX: 38.76 KG/M2

## 2019-05-17 DIAGNOSIS — M54.5 BILATERAL LOW BACK PAIN, UNSPECIFIED CHRONICITY, WITH SCIATICA PRESENCE UNSPECIFIED: ICD-10-CM

## 2019-05-17 DIAGNOSIS — R20.2 PARESTHESIA: ICD-10-CM

## 2019-05-17 DIAGNOSIS — R10.32 LEFT GROIN PAIN: ICD-10-CM

## 2019-05-17 PROCEDURE — 99204 OFFICE O/P NEW MOD 45 MIN: CPT | Performed by: PHYSICAL MEDICINE & REHABILITATION

## 2019-05-17 ASSESSMENT — PATIENT HEALTH QUESTIONNAIRE - PHQ9: CLINICAL INTERPRETATION OF PHQ2 SCORE: 0

## 2019-05-17 ASSESSMENT — PAIN SCALES - GENERAL: PAINLEVEL: 4=SLIGHT-MODERATE PAIN

## 2019-05-17 NOTE — PROGRESS NOTES
New patient note    Physiatry (physical medicine and  Rehabilitation), interventional spine and sports medicine    Date of Service: 5/17/2019    Chief complaint:Left hip and groin pain    HISTORY    HPI: Price Moon 73 y.o. male who presents today for evaluation of low back and left thigh pain.    Mr. Moon reports that about 15 years ago, he was helping to carry a very large beam with about 5 others.  The job required that he go up a large step.  When he did this, he got a large share of this weight and felt a snap and has had a torn ball.  He has had other muscle tears in the past, including in his right calf and a left biceps, and felt like this was similar.    He feels like he has pain that is on the lateral leg and sometimes down into the thigh, occasionally medial leg. His symptoms are getting worse.    In the past, she has had low back pain, but he reports that he has been active with football growing up, martial arts without trouble.  No recent imaging studies.    Currently, his symptoms are making it difficult to walk.   About 1000 steps, the left hip region pain makes him stop.  This is getting worse and he feels like this is causing more trouble.    He gets into a pool 5/7 days.  He has a swim spa.  He has no pain in the pool.       No bowel or bladder changes.  He does have an enlarged prostate.    Medical records review:  I reviewed the note from the referring provider Joann Wakefield M.D. dated 4/25/2019.  He was seen for hypothyroidism, hyperlipidemia, BPH, lung nodule, chronic left hip pain.  Referral was made for hip pain.  He is not interested in use of anti-inflammatories and declined PT at that time.    From review of records from Angel Wakefield MD on 08/14/2016, he had been referred to an Orthopedic surgeon.  This note indicates that symptoms were on the right.  He had been given prednisone for this in Feb 2016 and again at that visit.  I cannot find records from his evaluation with  orthopedic surgeon from 2016.    Previous treatments:    Physical Therapy: No    Medications the patient is tried: None regularly    Previous interventions: none    Previous surgeries to relieve the above pain:  none      ROS:   Eyes: Bad eyesight  : enlarged prostate  Endo: Synthroid    Red Flags ROS:   Fever, Chills, Sweats: Denies  Involuntary Weight Loss: Denies  Bladder Incontinence: Denies  Bowel Incontinence: denies  Saddle Anesthesia: Denies    All other systems reviewed and negative.       PMHx:   Past Medical History:   Diagnosis Date   • BPH (benign prostatic hyperplasia)    • Hyperlipidemia    • Thyroid disease        PSHx:   Past Surgical History:   Procedure Laterality Date   • EYE SURGERY     • HERNIA REPAIR         Family history   Family History   Problem Relation Age of Onset   • Hypertension Mother    • Other Father         ALS   • Hypertension Brother          Medications:   Current Outpatient Prescriptions   Medication   • atorvastatin (LIPITOR) 20 MG Tab   • Saw Palmetto, Serenoa repens, (SAW PALMETTO PO)   • SYNTHROID 100 MCG Tab   • bimatoprost (LUMIGAN) 0.01 % Solution   • triamcinolone acetonide (KENALOG) 0.1 % Cream     No current facility-administered medications for this visit.        Allergies:   No Known Allergies    Social Hx:   Social History     Social History   • Marital status:      Spouse name: N/A   • Number of children: N/A   • Years of education: N/A     Occupational History   • Not on file.     Social History Main Topics   • Smoking status: Former Smoker     Types: Cigarettes     Quit date: 1/20/1985   • Smokeless tobacco: Never Used   • Alcohol use No   • Drug use: No   • Sexual activity: Yes     Partners: Female     Other Topics Concern   •  Service Yes   • Blood Transfusions No   • Caffeine Concern No   • Occupational Exposure Yes   • Hobby Hazards Yes   • Sleep Concern No   • Stress Concern No   • Weight Concern Yes   • Special Diet No   • Back Care No  "  • Exercise Yes   • Bike Helmet No   • Seat Belt Yes   • Self-Exams No     Social History Narrative   • No narrative on file         EXAMINATION     Physical Exam:   Vitals: /62 (BP Location: Left arm, Patient Position: Sitting, BP Cuff Size: Adult long)   Pulse 68   Temp 36.4 °C (97.6 °F) (Temporal)   Ht 1.778 m (5' 10\")   Wt 122.8 kg (270 lb 11.6 oz)   SpO2 92%     Constitutional:   Body Habitus: Body mass index is 38.84 kg/m².  Cooperation: Fully cooperates with exam  Appearance: Well-groomed, well-nourished, not disheveled, in no acute distress    Eyes: No scleral icterus, no proptosis     ENT -no obvious auditory deficits, no facial droop     Skin -no rashes or lesions noted     Respiratory-  breathing comfortable on room air, no audible wheezing    Cardiovascular- capillary refills less than 2 seconds. No lower extremity edema is noted.     Gastrointestinal - no obvious abdominal masses, No tenderness to palpation in the abdomen    Psychiatric- alert and oriented ×3. Normal affect.     Gait - normal gait, no use of ambulatory device, nonantalgic. The patient can toe walk with ease. The patient can heel walk with ease..     Musculoskeletal -   Cervical spine   Inspection: No deformities of the skin over the cervical spine. No rashes or lesions.    Functional ROM of the cervical spine    Thoracic/Lumbar Spine/Sacral Spine/Hips   Inspection: No evidence of atrophy in bilateral lower extremities throughout.     There is a prominent soft mass proximal to the greater trochanter on the left.  There is no significant tenderness over this mass.  Some mild tenderness over the lateral leg and ITB    ROM: Decreased AROM with flexion, extension, lateral flexion, and rotation bilaterally, without pain     Palpation:   No tenderness to palpation in midline at T1-T12 levels. No significant tenderness over the left and right paraspinal muscles  palpation over SI joint: negative bilaterally    palpation over " buttock: negative bilaterally    palpation in hip or over the greater trochanters: negative bilaterally      Lumbar spine Special tests  Neuro tension  Straight leg test negative bilaterally        HIP  Symmetric ROM of the hips in internal and external rotation bilaterally    SI joint tests  Observation patient sits on one buttocks: Negative  TIERRA test negative bilaterally       Neuro       Key points for the international standards for neurological classification of spinal cord injury (ISNCSCI) to light touch.     Dermatome R L   L2 2 2   L3 2 2   L4 2 2   L5 2 2   S1 2 1   S2 2 1         Motor Exam Lower Extremities    ? Myotome R L   Hip flexion L2 5 5   Knee extension L3 5 5   Ankle dorsiflexion L4 5 5   Toe extension L5 5 5   Ankle plantarflexion S1 5 5         Knight’s sign negative bilaterally   Babinski sign negative bilaterally   Clonus of the ankle negative bilaterally     Reflexes  ?  R L   Biceps  2+  2+   Brachioradialis  2+ 2+   Patella  2+ 2+   Achilles   2+ 2+       MEDICAL DECISION MAKING    Medical records review: see under HPI section.     DATA    Labs:   Lab Results   Component Value Date/Time    SODIUM 142 04/19/2019 08:23 AM    POTASSIUM 4.4 04/19/2019 08:23 AM    CHLORIDE 105 04/19/2019 08:23 AM    CO2 26 04/19/2019 08:23 AM    ANION 11.0 04/19/2019 08:23 AM    GLUCOSE 97 04/19/2019 08:23 AM    BUN 20 04/19/2019 08:23 AM    CREATININE 1.02 04/19/2019 08:23 AM    CALCIUM 9.0 04/19/2019 08:23 AM    ASTSGOT 22 04/19/2019 08:23 AM    ALTSGPT 23 04/19/2019 08:23 AM    TBILIRUBIN 0.9 04/19/2019 08:23 AM    ALBUMIN 4.0 04/19/2019 08:23 AM    TOTPROTEIN 6.9 04/19/2019 08:23 AM    GLOBULIN 2.9 04/19/2019 08:23 AM    AGRATIO 1.4 04/19/2019 08:23 AM       No results found for: PROTHROMBTM, INR     Lab Results   Component Value Date/Time    WBC 4.0 (L) 04/19/2019 08:23 AM    RBC 5.25 04/19/2019 08:23 AM    HEMOGLOBIN 16.2 04/19/2019 08:23 AM    HEMATOCRIT 49.3 04/19/2019 08:23 AM    MCV 93.9 04/19/2019  08:23 AM    MCH 30.9 04/19/2019 08:23 AM    MCHC 32.9 (L) 04/19/2019 08:23 AM    MPV 10.0 04/19/2019 08:23 AM    NEUTSPOLYS 55.90 04/19/2019 08:23 AM    LYMPHOCYTES 30.50 04/19/2019 08:23 AM    MONOCYTES 10.80 04/19/2019 08:23 AM    EOSINOPHILS 2.00 04/19/2019 08:23 AM    BASOPHILS 0.50 04/19/2019 08:23 AM        No results found for: HBA1C     Imaging: None to review today                                                                           Diagnosis   Visit Diagnoses     ICD-10-CM   1. Bilateral low back pain, unspecified chronicity, with sciatica presence unspecified M54.5   2. Left groin pain R10.32   3. Paresthesia R20.2           ASSESSMENT:  Price Gilberto Sarai 73 y.o. male  With intermittent low back pain, palpable mass over the lateral left leg, paresthesias and groin pain     Price was seen today for new patient.    Diagnoses and all orders for this visit:    Bilateral low back pain, unspecified chronicity, with sciatica presence unspecified  -     DX-LUMBAR SPINE-2 OR 3 VIEWS; Future  -     REFERRAL TO EMG - PHYSIATRY (PMR)    Left groin pain  -     DX-HIP-UNILATERAL-W/O PELVIS-2/3 VIEWS LEFT; Future  -     REFERRAL TO EMG - PHYSIATRY (PMR)    Paresthesia  -     REFERRAL TO EMG - PHYSIATRY (PMR)      1. Discussed xrays of the lumbar spine and left hip.  He does report that he had xrays of the left hip in the past, but if he has damaged the gluteus medius, for example, could contribute to hip abnormalities.  2. Discussed the findings of decreased sensation in the left leg.  Cannot exclude a lumbar etiology.  EMG of the left lower extremity ordered to further evaluate and possible ultrasound evaluation at the same time.    Follow-up:For EMG      Inder Ponce MD  Physical Medicine and Rehabilitation  Interventional Spine and Sports Physiatry  [facility]

## 2019-05-20 ENCOUNTER — HOSPITAL ENCOUNTER (OUTPATIENT)
Dept: RADIOLOGY | Facility: MEDICAL CENTER | Age: 74
End: 2019-05-20
Attending: PHYSICAL MEDICINE & REHABILITATION
Payer: MEDICARE

## 2019-05-20 DIAGNOSIS — M54.5 BILATERAL LOW BACK PAIN, UNSPECIFIED CHRONICITY, WITH SCIATICA PRESENCE UNSPECIFIED: ICD-10-CM

## 2019-05-20 DIAGNOSIS — R10.32 LEFT GROIN PAIN: ICD-10-CM

## 2019-05-20 PROCEDURE — 72100 X-RAY EXAM L-S SPINE 2/3 VWS: CPT

## 2019-05-20 PROCEDURE — 73502 X-RAY EXAM HIP UNI 2-3 VIEWS: CPT | Mod: LT

## 2019-05-23 ENCOUNTER — OFFICE VISIT (OUTPATIENT)
Dept: PHYSICAL MEDICINE AND REHAB | Facility: MEDICAL CENTER | Age: 74
End: 2019-05-23
Payer: MEDICARE

## 2019-05-23 VITALS
BODY MASS INDEX: 38.47 KG/M2 | HEIGHT: 70 IN | HEART RATE: 66 BPM | DIASTOLIC BLOOD PRESSURE: 70 MMHG | TEMPERATURE: 97.5 F | WEIGHT: 268.74 LBS | OXYGEN SATURATION: 92 % | SYSTOLIC BLOOD PRESSURE: 126 MMHG

## 2019-05-23 DIAGNOSIS — M54.17 LUMBOSACRAL RADICULOPATHY: ICD-10-CM

## 2019-05-23 DIAGNOSIS — M54.5 BILATERAL LOW BACK PAIN, UNSPECIFIED CHRONICITY, WITH SCIATICA PRESENCE UNSPECIFIED: ICD-10-CM

## 2019-05-23 DIAGNOSIS — R20.2 PARESTHESIA: ICD-10-CM

## 2019-05-23 PROCEDURE — 95886 MUSC TEST DONE W/N TEST COMP: CPT | Performed by: PHYSICAL MEDICINE & REHABILITATION

## 2019-05-23 PROCEDURE — 95908 NRV CNDJ TST 3-4 STUDIES: CPT | Performed by: PHYSICAL MEDICINE & REHABILITATION

## 2019-05-23 PROCEDURE — 99214 OFFICE O/P EST MOD 30 MIN: CPT | Mod: 25 | Performed by: PHYSICAL MEDICINE & REHABILITATION

## 2019-05-23 ASSESSMENT — PAIN SCALES - GENERAL: PAINLEVEL: NO PAIN

## 2019-05-23 NOTE — PROGRESS NOTES
"  [facility]  Sports and Spine, Physiatry, EMG  [facility]  [address]  [place] [address]    Test Date:  2019    Patient: Price Fay : 1945 Physician: Inder Ponce MD   Sex: Male Height: 5' 10\" Ref Phys: Inder Ponce MD   MRN#: [id number] Weight: 121.9 lbs. Technician: N/A     Chief complaints:  Low back and left leg pain    Mr. Moon is a 73 year-old male who presents for evaluation of symptoms in the low back and left groin/thigh.  Symptoms are not particularly changed since last visit on 2019.  He does not report significant history of low back pain, but has done physical work for many years and has been active in sports.    PMH/PSH/Meds/SH are unchanged from previous visit.    Past medical history is negative for diabetes mellitus, cervical or lumbar spine surgery.  Positive for hypothyroidism and history of skin cancer and melanoma.    FH is negative for known history of neuromuscular disease.    Exam: see 2019, no changes.      NCV & EMG Findings:  All nerve conduction studies (as indicated in the following tables) were within normal limits.      Needle evaluation of the left gastroc muscle showed widespread spontaneous activity.  The left biceps femoris (short head) muscle showed slightly increased spontaneous activity.  All remaining muscles (as indicated in the following table) showed no evidence of electrical instability.      Imaging studies: The following images imaging studies were reviewed and these are my reports  X-ray lumbar spine May 20, 2019  There is note of moderate degenerative disc disease at L2-3 and L5-S1.  There is also note of grade 1 spondylolisthesis at L5-S1.  There is facet arthropathy greatest at L5-S1 and to a lesser extent at L4-5.    Xray left hip May 20, 2019  There is minimal-mild degenerative change in the hip joints bilaterally without significant joint space narrowing.  Mild spurring at the pubic " symphysis.    I reviewed the following imaging report:  X-ray left hip May 20, 2019  No radiographic evidence of acute traumatic injury.  Parasymphyseal spurring.  Acetabula are shallow but there is no joint space narrowing or bony spurring    Impression/Recommendations:  Abnormal study    1. Today's electrodiagnostic evaluation is suggestive of:      A.  Acute left lumbosacral radiculopathy  2. Today's electrodiagnostic findings point against:      A.  Left fibular neuropathy      B.  Generalized peripheral polyneuropathy, although this was incompletely evaluated  3.  Clinically, his symptoms are consistent with left lumbosacral radiculopathy.  We reviewed his x-rays today and discussed proceeding with MRI of the lumbar spine.  I will plan to see him back in the office after that study is done.    ___________________________  Inder Ponce MD  Physical Medicine and Rehabilitation  Interventional Spine and Sports Physiatry  [facility]          Nerve Conduction Studies  Anti Sensory Summary Table     Stim Site NR Peak (ms) Norm Peak (ms) P-T Amp (µV) Norm P-T Amp Site1 Site2 Delta-P (ms) Dist (cm) Wilfred (m/s) Norm Wilfred (m/s)   Left Sup Fibular Anti Sensory (Ant Lat Mall)   14 cm    4.2 <4.4 6.8 >5.0 14 cm Ant Lat Mall 4.2 14.0 33 >32   Site 2    4.1  6.9          Left Sural Anti Sensory (Lat Mall)   Site 3    4.2  24.3            Motor Summary Table     Stim Site NR Onset (ms) Norm Onset (ms) O-P Amp (mV) Norm O-P Amp Site1 Site2 Delta-0 (ms) Dist (cm) Wilfred (m/s) Norm Wilfred (m/s)   Left Fibular Motor (Ext Dig Brev)   Ankle    4.5 <6.1 5.2 >2.5 B Fib Ankle 7.1 32.0 45 >38   B Fib    11.6  5.2  Poplt B Fib 1.9 10.0 53 >40   Poplt    13.5  5.1            EMG+     Side Muscle Nerve Root Ins Act Fibs Psw Amp Dur Poly Recrt Int Pat Other Comment   Left VastusMed Femoral L2-4 Nml Nml Nml Nml Nml 0 Nml Nml None    Left AntTibialis Dp Br Fibular L4-5 Nml Nml Nml Nml Nml 0 Nml Nml None    Left Fibularis Long Sup Br Fibular  L5-S1 Nml Nml Nml Nml Nml 0 Nml Nml None    Left Gastroc Tibial S1-2 Nml 4+ 4+ Nml Nml 0 Nml Nml None    Left AbdHallucis MedPlantar S1-2 Nml Nml Nml Nml Nml 0 Nml Nml None    Left AdductorLong Obturator L2-4 Nml Nml Nml Nml Nml 0 Nml Nml None    Left BicepsFemS Sciatic L5-S1 Nml 1+ 1+ Nml Nml 0 Nml Nml None    Left Lumbo Parasp Up Rami L1-2 Nml Nml Nml      None    Left Lumbo Parasp Mid Rami L3-4 Nml Nml Nml      None    Left Lumbo Parasp Low Rami L5-S1 Nml Nml Nml      None    Left RectFemoris Femoral L2-4 Nml Nml Nml Nml Nml 0 Nml Nml None    Left TensorFascLat SupGluteal L4-5, S1 Nml Nml Nml Nml Nml 0 Nml Nml None            Waveforms:

## 2019-05-28 ENCOUNTER — APPOINTMENT (OUTPATIENT)
Dept: RADIOLOGY | Facility: MEDICAL CENTER | Age: 74
End: 2019-05-28
Attending: PHYSICAL MEDICINE & REHABILITATION
Payer: MEDICARE

## 2019-05-28 DIAGNOSIS — M54.17 LUMBOSACRAL RADICULOPATHY: ICD-10-CM

## 2019-05-28 PROCEDURE — 72148 MRI LUMBAR SPINE W/O DYE: CPT

## 2019-06-06 ENCOUNTER — OFFICE VISIT (OUTPATIENT)
Dept: PHYSICAL MEDICINE AND REHAB | Facility: MEDICAL CENTER | Age: 74
End: 2019-06-06
Payer: MEDICARE

## 2019-06-06 VITALS
SYSTOLIC BLOOD PRESSURE: 122 MMHG | BODY MASS INDEX: 38.16 KG/M2 | HEIGHT: 70 IN | DIASTOLIC BLOOD PRESSURE: 70 MMHG | OXYGEN SATURATION: 90 % | WEIGHT: 266.54 LBS | TEMPERATURE: 97.3 F | HEART RATE: 83 BPM

## 2019-06-06 DIAGNOSIS — M43.16 SPONDYLOLISTHESIS, LUMBAR REGION: ICD-10-CM

## 2019-06-06 DIAGNOSIS — M47.816 ARTHROPATHY OF LUMBAR FACET JOINT: ICD-10-CM

## 2019-06-06 DIAGNOSIS — M48.062 SPINAL STENOSIS OF LUMBAR REGION WITH NEUROGENIC CLAUDICATION: ICD-10-CM

## 2019-06-06 DIAGNOSIS — M48.061 LUMBAR FORAMINAL STENOSIS: ICD-10-CM

## 2019-06-06 DIAGNOSIS — M54.17 LUMBOSACRAL RADICULOPATHY: ICD-10-CM

## 2019-06-06 PROCEDURE — 99214 OFFICE O/P EST MOD 30 MIN: CPT | Performed by: PHYSICAL MEDICINE & REHABILITATION

## 2019-06-06 ASSESSMENT — PAIN SCALES - GENERAL: PAINLEVEL: 6=MODERATE PAIN

## 2019-06-06 ASSESSMENT — PATIENT HEALTH QUESTIONNAIRE - PHQ9: CLINICAL INTERPRETATION OF PHQ2 SCORE: 0

## 2019-06-06 NOTE — PROGRESS NOTES
Follow-up patient note    Physiatry (physical medicine and  Rehabilitation), interventional spine and sports medicine    Date of Service: 06/06/2019    Chief complaint:Left leg pain    HISTORY    HPI: Price Moon 73 y.o. male who presents today for evaluation of low back and left thigh pain.    Mr. Moon reports that about 15 years ago, he was helping to carry a very large beam with about 5 others.  The job required that he go up a large step.  When he did this, he got a large share of this weight and felt a snap and has had a torn ball.  He has had other muscle tears in the past, including in his right calf and a left biceps, and felt like this was similar.    Since his last visit, he reports that he continues to have pain in the low back that is aching and sometimes stabbing.  It radiates into the left thigh.  Symptoms rarely radiate below the knee. His symptoms are getting worse and symptoms worsen as he walks.  After about 1000 steps, he feels like he needs to rest.  No weakness in the leg.    He gets into a pool 5/7 days.  He has a swim spa.  He has no pain in the pool.   This is unchanged.    He does not regularly take medications for pain.     No bowel or bladder changes.  He does have an enlarged prostate.    Medical records review:  I reviewed the note from the referring provider Joann Wakefield M.D. dated 4/25/2019.  He was seen for hypothyroidism, hyperlipidemia, BPH, lung nodule, chronic left hip pain.  Referral was made for hip pain.  He is not interested in use of anti-inflammatories and declined PT at that time.    From review of records from Angel Wakefield MD on 08/14/2016, he had been referred to an Orthopedic surgeon.  This note indicates that symptoms were on the right.  He had been given prednisone for this in Feb 2016 and again at that visit.  I cannot find records from his evaluation with orthopedic surgeon from 2016.    Previous treatments:    Physical Therapy: No    Medications  the patient is tried: None regularly    Previous interventions: none    Previous surgeries to relieve the above pain:  none      ROS: Unchanged from 05/23/2019  Eyes: Bad eyesight  : enlarged prostate  Endo: Synthroid    Red Flags ROS:   Fever, Chills, Sweats: Denies  Involuntary Weight Loss: Denies  Bladder Incontinence: Denies  Bowel Incontinence: denies  Saddle Anesthesia: Denies    All other systems reviewed and negative.       PMHx:   Past Medical History:   Diagnosis Date   • BPH (benign prostatic hyperplasia)    • Hyperlipidemia    • Thyroid disease        PSHx:   Past Surgical History:   Procedure Laterality Date   • EYE SURGERY     • HERNIA REPAIR         Family history   Family History   Problem Relation Age of Onset   • Hypertension Mother    • Other Father         ALS   • Hypertension Brother          Medications:   Current Outpatient Prescriptions   Medication   • atorvastatin (LIPITOR) 20 MG Tab   • Saw Palmetto, Serenoa repens, (SAW PALMETTO PO)   • SYNTHROID 100 MCG Tab   • bimatoprost (LUMIGAN) 0.01 % Solution   • triamcinolone acetonide (KENALOG) 0.1 % Cream     No current facility-administered medications for this visit.        Allergies:   No Known Allergies    Social Hx:   Social History     Social History   • Marital status:      Spouse name: N/A   • Number of children: N/A   • Years of education: N/A     Occupational History   • Not on file.     Social History Main Topics   • Smoking status: Former Smoker     Types: Cigarettes     Quit date: 1/20/1985   • Smokeless tobacco: Never Used   • Alcohol use No   • Drug use: No   • Sexual activity: Yes     Partners: Female     Other Topics Concern   •  Service Yes   • Blood Transfusions No   • Caffeine Concern No   • Occupational Exposure Yes   • Hobby Hazards Yes   • Sleep Concern No   • Stress Concern No   • Weight Concern Yes   • Special Diet No   • Back Care No   • Exercise Yes   • Bike Helmet No   • Seat Belt Yes   • Self-Exams  "No     Social History Narrative   • No narrative on file         EXAMINATION     Physical Exam:   Vitals: /70 (BP Location: Left arm, Patient Position: Sitting, BP Cuff Size: Adult long)   Pulse 83   Temp 36.3 °C (97.3 °F) (Temporal)   Ht 1.778 m (5' 10\")   Wt 120.9 kg (266 lb 8.6 oz)   SpO2 90%     Constitutional:   Body Habitus: Body mass index is 38.24 kg/m².  Cooperation: Fully cooperates with exam  Appearance: Well-groomed, well-nourished, not disheveled, in no acute distress    Eyes: No scleral icterus, no proptosis     ENT -no obvious auditory deficits, no facial droop     Skin -no rashes or lesions noted     Respiratory-  breathing comfortable on room air, no audible wheezing    Cardiovascular- No lower extremity edema is noted.     Psychiatric- alert and oriented ×3. Normal affect.     Gait - normal gait, no use of ambulatory device, nonantalgic.     Musculoskeletal -     Thoracic/Lumbar Spine/Sacral Spine/Hips   Inspection: No evidence of atrophy in bilateral lower extremities throughout.     There is a prominent soft mass proximal to the greater trochanter on the left.  There is no significant tenderness over this mass.  Some mild tenderness over the lateral leg and ITB    ROM: Decreased AROM with flexion, extension, lateral flexion, and rotation bilaterally, without pain     Palpation:   No tenderness to palpation in midline at T1-T12 levels. No significant tenderness over the left and right paraspinal muscles  palpation over SI joint: negative bilaterally    palpation over buttock: negative bilaterally    palpation in hip or over the greater trochanters: negative bilaterally      Lumbar spine Special tests  Neuro tension  Straight leg test negative bilaterally        HIP  Symmetric ROM of the hips in internal and external rotation bilaterally    SI joint tests  Observation patient sits on one buttocks: Negative  TIERRA test negative bilaterally       Neuro       Key points for the " international standards for neurological classification of spinal cord injury (ISNCSCI) to light touch.     Dermatome R L   L2 2 2   L3 2 2   L4 2 2   L5 2 2   S1 2 1   S2 2 1         Motor Exam Lower Extremities    ? Myotome R L   Hip flexion L2 5 5   Knee extension L3 5 5   Ankle dorsiflexion L4 5 5   Toe extension L5 5 5   Ankle plantarflexion S1 5 5         Knight’s sign negative bilaterally   Babinski sign negative bilaterally   Clonus of the ankle negative bilaterally     Reflexes  ?  R L   Biceps  2+  2+   Brachioradialis  2+ 2+   Patella  2+ 2+   Achilles   2+ 2+       MEDICAL DECISION MAKING    Medical records review: see under HPI section.     DATA    EM2019  Impression/Recommendations:  Abnormal study     1. Today's electrodiagnostic evaluation is suggestive of:      A.  Acute left lumbosacral radiculopathy  2. Today's electrodiagnostic findings point against:      A.  Left fibular neuropathy      B.  Generalized peripheral polyneuropathy, although this was incompletely evaluated  3.  Clinically, his symptoms are consistent with left lumbosacral radiculopathy.  We reviewed his x-rays today and discussed proceeding with MRI of the lumbar spine.  I will plan to see him back in the office after that study is done.    Labs:   Lab Results   Component Value Date/Time    SODIUM 142 2019 08:23 AM    POTASSIUM 4.4 2019 08:23 AM    CHLORIDE 105 2019 08:23 AM    CO2 26 2019 08:23 AM    ANION 11.0 2019 08:23 AM    GLUCOSE 97 2019 08:23 AM    BUN 20 2019 08:23 AM    CREATININE 1.02 2019 08:23 AM    CALCIUM 9.0 2019 08:23 AM    ASTSGOT 22 2019 08:23 AM    ALTSGPT 23 2019 08:23 AM    TBILIRUBIN 0.9 2019 08:23 AM    ALBUMIN 4.0 2019 08:23 AM    TOTPROTEIN 6.9 2019 08:23 AM    GLOBULIN 2.9 2019 08:23 AM    AGRATIO 1.4 2019 08:23 AM       No results found for: PROTHROMBTM, INR     Lab Results   Component Value  Date/Time    WBC 4.0 (L) 04/19/2019 08:23 AM    RBC 5.25 04/19/2019 08:23 AM    HEMOGLOBIN 16.2 04/19/2019 08:23 AM    HEMATOCRIT 49.3 04/19/2019 08:23 AM    MCV 93.9 04/19/2019 08:23 AM    MCH 30.9 04/19/2019 08:23 AM    MCHC 32.9 (L) 04/19/2019 08:23 AM    MPV 10.0 04/19/2019 08:23 AM    NEUTSPOLYS 55.90 04/19/2019 08:23 AM    LYMPHOCYTES 30.50 04/19/2019 08:23 AM    MONOCYTES 10.80 04/19/2019 08:23 AM    EOSINOPHILS 2.00 04/19/2019 08:23 AM    BASOPHILS 0.50 04/19/2019 08:23 AM        No results found for: HBA1C     Imaging:  The following imaging studies were reviewed and these are my interpretations:  MRI lumbar 05/28/2019  There is note of multiple disc bulges throughout the lumbar spine.  Moderate central canal and foraminal stenosis at L2-3   Moderate central and foraminal stenosis at L3-4  Moderate foraminal stenosis and facet arthropathy at L4-5  L5-S1 grade I spondylolisthesis and severe facet arthropathy at L5-S1.  There is moderate foraminal stenosis on the left and severe foraminal stenosis on the right.    The following images studies were reviewed and these are the imaging reports   MRI lumbar 05/28/2019  Multilevel degenerative changes of the lumbar spine as described above                                                                             Diagnosis   Visit Diagnoses     ICD-10-CM   1. Lumbosacral radiculopathy M54.17   2. Spinal stenosis of lumbar region with neurogenic claudication M48.062   3. Arthropathy of lumbar facet joint M47.816   4. Lumbar foraminal stenosis M99.83   5. Spondylolisthesis, lumbar region M43.16           ASSESSMENT:  Price Moon 73 y.o. male  With intermittent low back pain, palpable mass over the lateral left leg, paresthesias and groin pain     Price was seen today for follow-up.    Diagnoses and all orders for this visit:    Lumbosacral radiculopathy  Comments:  Primarily left S1, EMG  Orders:  -     REFERRAL TO PHYSICAL THERAPY Reason for Therapy:  Eval/Treat/Report    Spinal stenosis of lumbar region with neurogenic claudication  -     REFERRAL TO PHYSICAL THERAPY Reason for Therapy: Eval/Treat/Report    Arthropathy of lumbar facet joint    Lumbar foraminal stenosis  -     REFERRAL TO PHYSICAL THERAPY Reason for Therapy: Eval/Treat/Report    Spondylolisthesis, lumbar region  Comments:  L5-S1  Orders:  -     REFERRAL TO PHYSICAL THERAPY Reason for Therapy: Eval/Treat/Report           1. Discussed findings on MRI which include spinal stenosis, multilevel foraminal stenosis and facet arthropathy.    2. Treatment options range from use of medications, injections, physical therapy and possible surgical intervention. He would like to start with a trial of physical therapy.  We can consider other options depending on how he does.    Follow-up: 6 weeks or prn      Inder Ponce MD  Physical Medicine and Rehabilitation  Interventional Spine and Sports Physiatry  [facility]

## 2019-06-17 ENCOUNTER — PHYSICAL THERAPY (OUTPATIENT)
Dept: PHYSICAL THERAPY | Facility: MEDICAL CENTER | Age: 74
End: 2019-06-17
Attending: PHYSICAL MEDICINE & REHABILITATION
Payer: MEDICARE

## 2019-06-17 DIAGNOSIS — M54.17 LUMBOSACRAL RADICULOPATHY: ICD-10-CM

## 2019-06-17 DIAGNOSIS — M48.061 LUMBAR FORAMINAL STENOSIS: ICD-10-CM

## 2019-06-17 DIAGNOSIS — M48.062 SPINAL STENOSIS OF LUMBAR REGION WITH NEUROGENIC CLAUDICATION: ICD-10-CM

## 2019-06-17 DIAGNOSIS — M43.16 SPONDYLOLISTHESIS, LUMBAR REGION: ICD-10-CM

## 2019-06-17 PROCEDURE — 97162 PT EVAL MOD COMPLEX 30 MIN: CPT

## 2019-06-17 NOTE — OP THERAPY EVALUATION
Outpatient Physical Therapy  INITIAL EVALUATION    [facility] Outpatient Physical Therapy  [address]  [place] [address]  Phone:  [phone]  Fax:  [phone]    Date of Evaluation: 06/17/2019    Patient: Price Moon  YOB: 1945  MRN: [id number]     Referring Provider: Inder Ponce M.D.  [address]  [address]  [place] [address]   Referring Diagnosis Lumbosacral radiculopathy [M54.17];Spinal stenosis of lumbar region with neurogenic claudication [M48.062];Lumbar foraminal stenosis [M99.83];Spondylolisthesis, lumbar region [M43.16]     Time Calculation             Physical Therapy Occurrence Codes    Date of onset of impairment:  6/17/18   Date physical therapy care plan established or reviewed:  6/17/19   Date physical therapy treatment started:  6/17/19          Chief Complaint: Hip Injury    Visit Diagnoses     ICD-10-CM   1. Lumbosacral radiculopathy M54.17   2. Spinal stenosis of lumbar region with neurogenic claudication M48.062   3. Lumbar foraminal stenosis M99.83   4. Spondylolisthesis, lumbar region M43.16         Subjective  73 year old male has a chronic L hip problem for the last 10 years after injuring it lifting lumbar has been getting worse this last year  Pt also has a chronic lumbar spine with stenosis and spondylolisthesis but says lumbar spine does not bother him  L lat hip pain if ambulating > 1000 feet  Pt is RTD and likes to do martial arts and swimming  Goals to be able to ambulate painfree  Past Medical History:   Diagnosis Date   • BPH (benign prostatic hyperplasia)    • Hyperlipidemia    • Thyroid disease      Past Surgical History:   Procedure Laterality Date   • EYE SURGERY     • HERNIA REPAIR       Social History   Substance Use Topics   • Smoking status: Former Smoker     Types: Cigarettes     Quit date: 1/20/1985   • Smokeless tobacco: Never Used   • Alcohol use No     Family and Occupational History     Social  History   • Marital status:      Spouse name: N/A   • Number of children: N/A   • Years of education: N/A       Objective  Increased BMI  Significantly elevated L pelvis  5/5 muscle strength all groups  Hip ROM is WNL bi lat  Sensation intact  Gait is slightly antalgic  On palpation tender IT band L   L gluteals    Exercises/Treatment  Time-based treatments/modalities:      HEP for gluteal and psoas stretch    Assessment, Response and Plan:   Prognosis: good    Goals:   Short Term Goals:   Pt to be able to ambulate 2000 feet without pain  Pt to be I with HEP and able to demonstrate  Short term goal time span:  2-4 weeks      Long Term Goals:    Pt to ba able to ambulate 1 mile with no pain  Long term goal time span:  4-6 weeks    Plan:   Planned therapy interventions:  Manual Therapy (CPT 97099), Therapeutic Activities (CPT 91160) and Therapeutic Exercise (CPT 40186)  Frequency:  2x week  Duration in weeks:  6  Duration in visits:  12      Functional Limitations and Severity Modifiers      Current:     Goal:       Referring provider co-signature:  I have reviewed this plan of care and my co-signature certifies the need for services.  Certification Dates:   From 6/17/19    To 7/29/19    Physician Signature: ________________________________ Date: ______________

## 2019-06-19 ENCOUNTER — PHYSICAL THERAPY (OUTPATIENT)
Dept: PHYSICAL THERAPY | Facility: MEDICAL CENTER | Age: 74
End: 2019-06-19
Attending: PHYSICAL MEDICINE & REHABILITATION
Payer: MEDICARE

## 2019-06-19 DIAGNOSIS — M54.17 LUMBOSACRAL RADICULOPATHY: ICD-10-CM

## 2019-06-19 PROCEDURE — 97110 THERAPEUTIC EXERCISES: CPT

## 2019-06-19 PROCEDURE — 97140 MANUAL THERAPY 1/> REGIONS: CPT

## 2019-06-19 NOTE — OP THERAPY DAILY TREATMENT
Outpatient Physical Therapy  DAILY TREATMENT     [facility] Outpatient Physical Therapy  [address]  [place] [address]  Phone:  [phone]  Fax:  [phone]    Date: 06/19/2019    Patient: Price Moon  YOB: 1945  MRN: [id number]     Time Calculation  Start time: 0200  Stop time: 0230 Time Calculation (min): 30 minutes     Chief Complaint: No chief complaint on file.    Visit #: 2    SUBJECTIVE:  No soreness following eval,adductor pain after walking    OBJECTIVE:  Current objective measures:     alignment looks a lot better    Exercises/Treatment  Time-based treatments/modalities:   STM R adductors  R hip mobs  LE stretches gluteals  Stabilisation exc   STM IT band    ASSESSMENT:   Response to treatment:   Less adductor pain following treatment   PLAN/RECOMMENDATIONS:   Plan for treatment: therapy treatment to continue next visit.  Planned interventions for next visit: continue with current treatment.

## 2019-07-02 ENCOUNTER — APPOINTMENT (OUTPATIENT)
Dept: PHYSICAL THERAPY | Facility: MEDICAL CENTER | Age: 74
End: 2019-07-02
Attending: PHYSICAL MEDICINE & REHABILITATION
Payer: MEDICARE

## 2019-07-03 ENCOUNTER — PHYSICAL THERAPY (OUTPATIENT)
Dept: PHYSICAL THERAPY | Facility: MEDICAL CENTER | Age: 74
End: 2019-07-03
Attending: PHYSICAL MEDICINE & REHABILITATION
Payer: MEDICARE

## 2019-07-03 DIAGNOSIS — M54.17 LUMBOSACRAL RADICULOPATHY: ICD-10-CM

## 2019-07-03 PROCEDURE — 97140 MANUAL THERAPY 1/> REGIONS: CPT

## 2019-07-03 NOTE — OP THERAPY DAILY TREATMENT
Outpatient Physical Therapy  DAILY TREATMENT     [facility] Out[facility] Physical Therapy  [address]  [place] [address]  Phone:  [phone]  Fax:  [phone]    Date: 07/03/2019    Patient: Price Moon  YOB: 1945  MRN: [id number]     Time Calculation  Start time: 1000  Stop time: 1030 Time Calculation (min): 30 minutes     Chief Complaint: No chief complaint on file.    Visit #: 3    SUBJECTIVE:  Pt reports only 1 day of hip pain after shopping for long periods    OBJECTIVE:  Current objective measures:     Gait looks normal    Exercises/Treatment  Time-based treatments/modalities:      L hip mobs quadrant,flexion  STM IT band  Stretches gluteals,psoas  L hip strengthening excs    ASSESSMENT:   Response to treatment:   Hip range and STM is improving  PLAN/RECOMMENDATIONS:   Plan for treatment: therapy treatment to continue next visit.  Planned interventions for next visit: continue with current treatment.

## 2019-07-08 ENCOUNTER — PHYSICAL THERAPY (OUTPATIENT)
Dept: PHYSICAL THERAPY | Facility: MEDICAL CENTER | Age: 74
End: 2019-07-08
Attending: PHYSICAL MEDICINE & REHABILITATION
Payer: MEDICARE

## 2019-07-08 DIAGNOSIS — M54.17 LUMBOSACRAL RADICULOPATHY: ICD-10-CM

## 2019-07-08 PROCEDURE — 97140 MANUAL THERAPY 1/> REGIONS: CPT

## 2019-07-08 PROCEDURE — 97110 THERAPEUTIC EXERCISES: CPT

## 2019-07-08 NOTE — OP THERAPY DAILY TREATMENT
Outpatient Physical Therapy  DAILY TREATMENT     [facility] Outpatient Physical Therapy  [address]  [place] [address]  Phone:  [phone]  Fax:  [phone]    Date: 07/08/2019    Patient: Price Moon  YOB: 1945  MRN: [id number]     Time Calculation  Start time: 0900  Stop time: 0930 Time Calculation (min): 30 minutes     Chief Complaint: Hip Problem    Visit #: 4    SUBJECTIVE:  One episode of L hip pain after walking around [place]    OBJECTIVE:  Current objective measures:         Exercises/Treatment  Time-based treatments/modalities:   L hip mobs  Strengthening exc L hip  Gluteal and quads stretch  STM L IT band       ASSESSMENT:   Response to treatment:   Less pain with ambulating    PLAN/RECOMMENDATIONS:   Plan for treatment: therapy treatment to continue next visit.  Planned interventions for next visit: continue with current treatment.

## 2019-07-10 ENCOUNTER — PHYSICAL THERAPY (OUTPATIENT)
Dept: PHYSICAL THERAPY | Facility: MEDICAL CENTER | Age: 74
End: 2019-07-10
Attending: PHYSICAL MEDICINE & REHABILITATION
Payer: MEDICARE

## 2019-07-10 DIAGNOSIS — M54.17 LUMBOSACRAL RADICULOPATHY: ICD-10-CM

## 2019-07-10 PROCEDURE — 97140 MANUAL THERAPY 1/> REGIONS: CPT

## 2019-07-10 NOTE — OP THERAPY DAILY TREATMENT
Outpatient Physical Therapy  DAILY TREATMENT     [facility] Outpatient Physical Therapy  [address]  [place] [address]  Phone:  [phone]  Fax:  [phone]    Date: 07/10/2019    Patient: Price Moon  YOB: 1945  MRN: [id number]     Time Calculation  Start time: 1100  Stop time: 1130 Time Calculation (min): 30 minutes     Chief Complaint: No chief complaint on file.    Visit #: 5    SUBJECTIVE:  No pain with increasing exc    OBJECTIVE:  Current objective measures:     alignment looks good    Exercises/Treatment  Time-based treatments/modalities:   L hip mobs  STM L gluteals  STM IT band  Psoas stretch  Strengthening exc L hip       ASSESSMENT:   Response to treatment:   Minimal pain improved STM  Pt will do exc for 2 weeks and report back for follow up  PLAN/RECOMMENDATIONS:   Plan for treatment: therapy treatment to continue next visit.  Planned interventions for next visit: continue with current treatment.

## 2019-07-18 ENCOUNTER — OFFICE VISIT (OUTPATIENT)
Dept: PHYSICAL MEDICINE AND REHAB | Facility: MEDICAL CENTER | Age: 74
End: 2019-07-18
Payer: MEDICARE

## 2019-07-18 VITALS
OXYGEN SATURATION: 91 % | HEIGHT: 70 IN | BODY MASS INDEX: 38.19 KG/M2 | SYSTOLIC BLOOD PRESSURE: 130 MMHG | WEIGHT: 266.76 LBS | TEMPERATURE: 97.8 F | HEART RATE: 66 BPM | DIASTOLIC BLOOD PRESSURE: 80 MMHG

## 2019-07-18 DIAGNOSIS — M48.061 LUMBAR FORAMINAL STENOSIS: ICD-10-CM

## 2019-07-18 DIAGNOSIS — M43.16 SPONDYLOLISTHESIS, LUMBAR REGION: ICD-10-CM

## 2019-07-18 DIAGNOSIS — M48.062 SPINAL STENOSIS OF LUMBAR REGION WITH NEUROGENIC CLAUDICATION: ICD-10-CM

## 2019-07-18 DIAGNOSIS — M54.17 LUMBOSACRAL RADICULOPATHY: ICD-10-CM

## 2019-07-18 PROCEDURE — 99213 OFFICE O/P EST LOW 20 MIN: CPT | Performed by: PHYSICAL MEDICINE & REHABILITATION

## 2019-07-18 ASSESSMENT — PATIENT HEALTH QUESTIONNAIRE - PHQ9: CLINICAL INTERPRETATION OF PHQ2 SCORE: 0

## 2019-07-18 ASSESSMENT — PAIN SCALES - GENERAL: PAINLEVEL: 4=SLIGHT-MODERATE PAIN

## 2019-07-18 NOTE — PROGRESS NOTES
Follow-up patient note    Physiatry (physical medicine and  Rehabilitation), interventional spine and sports medicine    Date of Service: 07/18/2019    Chief complaint:Left leg pain    HISTORY    HPI: Price Moon 74 y.o. male who presents today for evaluation of low back and left thigh pain.    After walking about 1000 steps, he has increased pain in the left hip and leg.  This then is difficult for the day.  It makes him walk abnormally.  He then gets in the pool and has no pain.     He has been doing his exercises from therapy.  They have reached a level without significant improvement.  He continues these with his home program.    No falls.  No bowel or bladder changes.  Very occasionally, he takes an excedrin, but not regularly.    By history:  Mr. Moon reports that about 15 years ago, he was helping to carry a very large beam with about 5 others.  The job required that he go up a large step.  When he did this, he got a large share of this weight and felt a snap and has had a torn ball.  He has had other muscle tears in the past, including in his right calf and a left biceps, and felt like this was similar.      Medical records review:  I reviewed the note from the referring provider Joann Wakefield M.D. dated 4/25/2019.  He was seen for hypothyroidism, hyperlipidemia, BPH, lung nodule, chronic left hip pain.  Referral was made for hip pain.  He is not interested in use of anti-inflammatories and declined PT at that time.    From review of records from Angel Wakefield MD on 08/14/2016, he had been referred to an Orthopedic surgeon.  This note indicates that symptoms were on the right.  He had been given prednisone for this in Feb 2016 and again at that visit.  I cannot find records from his evaluation with orthopedic surgeon from 2016.    Previous treatments:    Physical Therapy: No    Medications the patient is tried: None regularly    Previous interventions: none    Previous surgeries to relieve  the above pain:  none      ROS: Unchanged from 06/6/2019  Eyes: Bad eyesight  : enlarged prostate  Endo: Synthroid    Red Flags ROS:   Fever, Chills, Sweats: Denies  Involuntary Weight Loss: Denies  Bladder Incontinence: Denies  Bowel Incontinence: denies  Saddle Anesthesia: Denies    All other systems reviewed and negative.       PMHx:   Past Medical History:   Diagnosis Date   • BPH (benign prostatic hyperplasia)    • Hyperlipidemia    • Thyroid disease        PSHx:   Past Surgical History:   Procedure Laterality Date   • EYE SURGERY     • HERNIA REPAIR         Family history   Family History   Problem Relation Age of Onset   • Hypertension Mother    • Other Father         ALS   • Hypertension Brother          Medications:   Current Outpatient Prescriptions   Medication   • atorvastatin (LIPITOR) 20 MG Tab   • Saw Palmetto, Serenoa repens, (SAW PALMETTO PO)   • SYNTHROID 100 MCG Tab   • bimatoprost (LUMIGAN) 0.01 % Solution   • triamcinolone acetonide (KENALOG) 0.1 % Cream     No current facility-administered medications for this visit.        Allergies:   No Known Allergies    Social Hx:   Social History     Social History   • Marital status:      Spouse name: N/A   • Number of children: N/A   • Years of education: N/A     Occupational History   • Not on file.     Social History Main Topics   • Smoking status: Former Smoker     Types: Cigarettes     Quit date: 1/20/1985   • Smokeless tobacco: Never Used   • Alcohol use No   • Drug use: No   • Sexual activity: Yes     Partners: Female     Other Topics Concern   •  Service Yes   • Blood Transfusions No   • Caffeine Concern No   • Occupational Exposure Yes   • Hobby Hazards Yes   • Sleep Concern No   • Stress Concern No   • Weight Concern Yes   • Special Diet No   • Back Care No   • Exercise Yes   • Bike Helmet No   • Seat Belt Yes   • Self-Exams No     Social History Narrative   • No narrative on file         EXAMINATION     Physical Exam:  "  Vitals: /80 (BP Location: Left arm, Patient Position: Sitting, BP Cuff Size: Adult long)   Pulse 66   Temp 36.6 °C (97.8 °F) (Temporal)   Ht 1.778 m (5' 10\")   Wt 121 kg (266 lb 12.1 oz)   SpO2 91%     Constitutional:   Body Habitus: Body mass index is 38.28 kg/m².  Cooperation: Fully cooperates with exam  Appearance: Well-groomed, well-nourished, not disheveled, in no acute distress    Eyes: No scleral icterus, no proptosis     ENT -no obvious auditory deficits, no facial droop     Skin -no rashes or lesions noted     Respiratory-  breathing comfortable on room air, no audible wheezing    Cardiovascular- No lower extremity edema is noted.     Psychiatric- alert and oriented ×3. Normal affect.     Gait - normal gait, no use of ambulatory device       MEDICAL DECISION MAKING    Medical records review: see under HPI section.     DATA    EM2019  Impression/Recommendations:  Abnormal study     1. Today's electrodiagnostic evaluation is suggestive of:      A.  Acute left lumbosacral radiculopathy  2. Today's electrodiagnostic findings point against:      A.  Left fibular neuropathy      B.  Generalized peripheral polyneuropathy, although this was incompletely evaluated  3.  Clinically, his symptoms are consistent with left lumbosacral radiculopathy.  We reviewed his x-rays today and discussed proceeding with MRI of the lumbar spine.  I will plan to see him back in the office after that study is done.    Labs:   Lab Results   Component Value Date/Time    SODIUM 142 2019 08:23 AM    POTASSIUM 4.4 2019 08:23 AM    CHLORIDE 105 2019 08:23 AM    CO2 26 2019 08:23 AM    ANION 11.0 2019 08:23 AM    GLUCOSE 97 2019 08:23 AM    BUN 20 2019 08:23 AM    CREATININE 1.02 2019 08:23 AM    CALCIUM 9.0 2019 08:23 AM    ASTSGOT 22 2019 08:23 AM    ALTSGPT 23 2019 08:23 AM    TBILIRUBIN 0.9 2019 08:23 AM    ALBUMIN 4.0 2019 08:23 AM    " TOTPROTEIN 6.9 04/19/2019 08:23 AM    GLOBULIN 2.9 04/19/2019 08:23 AM    AGRATIO 1.4 04/19/2019 08:23 AM       No results found for: PROTHROMBTM, INR     Lab Results   Component Value Date/Time    WBC 4.0 (L) 04/19/2019 08:23 AM    RBC 5.25 04/19/2019 08:23 AM    HEMOGLOBIN 16.2 04/19/2019 08:23 AM    HEMATOCRIT 49.3 04/19/2019 08:23 AM    MCV 93.9 04/19/2019 08:23 AM    MCH 30.9 04/19/2019 08:23 AM    MCHC 32.9 (L) 04/19/2019 08:23 AM    MPV 10.0 04/19/2019 08:23 AM    NEUTSPOLYS 55.90 04/19/2019 08:23 AM    LYMPHOCYTES 30.50 04/19/2019 08:23 AM    MONOCYTES 10.80 04/19/2019 08:23 AM    EOSINOPHILS 2.00 04/19/2019 08:23 AM    BASOPHILS 0.50 04/19/2019 08:23 AM        No results found for: HBA1C     Imaging:  The following imaging studies were reviewed and these are my interpretations:  MRI lumbar 05/28/2019  There is note of multiple disc bulges throughout the lumbar spine.  Moderate central canal and foraminal stenosis at L2-3   Moderate central and foraminal stenosis at L3-4  Moderate foraminal stenosis and facet arthropathy at L4-5  L5-S1 grade I spondylolisthesis and severe facet arthropathy at L5-S1.  There is moderate foraminal stenosis on the left and severe foraminal stenosis on the right.    The following images studies were reviewed and these are the imaging reports   MRI lumbar 05/28/2019  Multilevel degenerative changes of the lumbar spine as described above                                                                             Diagnosis   Visit Diagnoses     ICD-10-CM   1. Lumbosacral radiculopathy M54.17   2. Spinal stenosis of lumbar region with neurogenic claudication M48.062   3. Spondylolisthesis, lumbar region M43.16   4. Lumbar foraminal stenosis M99.83           ASSESSMENT:  Price Moon 74 y.o. male  With intermittent low back pain, palpable mass over the lateral left leg, paresthesias and groin pain     Price was seen today for follow-up.    Diagnoses and all orders for this  visit:    Lumbosacral radiculopathy    Spinal stenosis of lumbar region with neurogenic claudication    Spondylolisthesis, lumbar region    Lumbar foraminal stenosis         1. Again reviewed findings on MRI which include spinal stenosis, multilevel foraminal stenosis and facet arthropathy.    2. Treatment options range from use of medications, injections, physical therapy and possible surgical intervention.  For now, plan to continue with home exercise program.  He is able to manage his symptoms and is taking medications for pain regularly.    Follow-up: tracey Ponce MD  Physical Medicine and Rehabilitation  Interventional Spine and Sports Physiatry  [facility]

## 2019-07-22 ENCOUNTER — APPOINTMENT (OUTPATIENT)
Dept: PHYSICAL THERAPY | Facility: MEDICAL CENTER | Age: 74
End: 2019-07-22
Attending: PHYSICAL MEDICINE & REHABILITATION
Payer: MEDICARE

## 2019-10-03 ENCOUNTER — APPOINTMENT (RX ONLY)
Dept: URBAN - METROPOLITAN AREA CLINIC 4 | Facility: CLINIC | Age: 74
Setting detail: DERMATOLOGY
End: 2019-10-03

## 2019-10-03 DIAGNOSIS — Z85.820 PERSONAL HISTORY OF MALIGNANT MELANOMA OF SKIN: ICD-10-CM

## 2019-10-03 DIAGNOSIS — L82.1 OTHER SEBORRHEIC KERATOSIS: ICD-10-CM

## 2019-10-03 DIAGNOSIS — D22 MELANOCYTIC NEVI: ICD-10-CM

## 2019-10-03 DIAGNOSIS — D18.0 HEMANGIOMA: ICD-10-CM

## 2019-10-03 DIAGNOSIS — L81.4 OTHER MELANIN HYPERPIGMENTATION: ICD-10-CM

## 2019-10-03 DIAGNOSIS — L57.0 ACTINIC KERATOSIS: ICD-10-CM

## 2019-10-03 PROBLEM — D22.5 MELANOCYTIC NEVI OF TRUNK: Status: ACTIVE | Noted: 2019-10-03

## 2019-10-03 PROBLEM — D22.61 MELANOCYTIC NEVI OF RIGHT UPPER LIMB, INCLUDING SHOULDER: Status: ACTIVE | Noted: 2019-10-03

## 2019-10-03 PROBLEM — D22.71 MELANOCYTIC NEVI OF RIGHT LOWER LIMB, INCLUDING HIP: Status: ACTIVE | Noted: 2019-10-03

## 2019-10-03 PROBLEM — D22.62 MELANOCYTIC NEVI OF LEFT UPPER LIMB, INCLUDING SHOULDER: Status: ACTIVE | Noted: 2019-10-03

## 2019-10-03 PROBLEM — D22.72 MELANOCYTIC NEVI OF LEFT LOWER LIMB, INCLUDING HIP: Status: ACTIVE | Noted: 2019-10-03

## 2019-10-03 PROBLEM — D18.01 HEMANGIOMA OF SKIN AND SUBCUTANEOUS TISSUE: Status: ACTIVE | Noted: 2019-10-03

## 2019-10-03 PROCEDURE — ? LIQUID NITROGEN

## 2019-10-03 PROCEDURE — 99214 OFFICE O/P EST MOD 30 MIN: CPT | Mod: 25

## 2019-10-03 PROCEDURE — 17000 DESTRUCT PREMALG LESION: CPT

## 2019-10-03 PROCEDURE — ? COUNSELING

## 2019-10-03 PROCEDURE — ? OBSERVATION

## 2019-10-03 PROCEDURE — 17003 DESTRUCT PREMALG LES 2-14: CPT

## 2019-10-03 ASSESSMENT — LOCATION DETAILED DESCRIPTION DERM
LOCATION DETAILED: LEFT PROXIMAL DORSAL FOREARM
LOCATION DETAILED: RIGHT ANTERIOR PROXIMAL UPPER ARM
LOCATION DETAILED: LEFT ANTERIOR DISTAL UPPER ARM
LOCATION DETAILED: POSTERIOR MID-PARIETAL SCALP
LOCATION DETAILED: LEFT DISTAL POSTERIOR THIGH
LOCATION DETAILED: RIGHT SUPERIOR PARIETAL SCALP
LOCATION DETAILED: NASAL DORSUM
LOCATION DETAILED: RIGHT VENTRAL DISTAL FOREARM
LOCATION DETAILED: LEFT PROXIMAL CALF
LOCATION DETAILED: RIGHT INFERIOR CENTRAL MALAR CHEEK
LOCATION DETAILED: EPIGASTRIC SKIN
LOCATION DETAILED: LEFT ANTECUBITAL SKIN
LOCATION DETAILED: LEFT SUPERIOR HELIX
LOCATION DETAILED: LEFT MEDIAL SUPERIOR CHEST
LOCATION DETAILED: RIGHT SUPERIOR HELIX
LOCATION DETAILED: LEFT RADIAL DORSAL HAND
LOCATION DETAILED: LEFT VENTRAL DISTAL FOREARM
LOCATION DETAILED: RIGHT POPLITEAL SKIN
LOCATION DETAILED: LEFT SCAPHA
LOCATION DETAILED: LEFT SUPERIOR MEDIAL MIDBACK
LOCATION DETAILED: RIGHT MEDIAL INFERIOR CHEST
LOCATION DETAILED: XIPHOID
LOCATION DETAILED: LEFT ULNAR DORSAL HAND
LOCATION DETAILED: RIGHT INFERIOR MEDIAL MIDBACK
LOCATION DETAILED: LEFT VENTRAL DISTAL FOREARM
LOCATION DETAILED: PERIUMBILICAL SKIN
LOCATION DETAILED: LEFT POPLITEAL SKIN
LOCATION DETAILED: RIGHT PROXIMAL DORSAL FOREARM
LOCATION DETAILED: RIGHT INFERIOR UPPER BACK
LOCATION DETAILED: RIGHT DISTAL POSTERIOR THIGH
LOCATION DETAILED: RIGHT ANTERIOR DISTAL UPPER ARM
LOCATION DETAILED: RIGHT INFERIOR HELIX
LOCATION DETAILED: LEFT SUPERIOR PARIETAL SCALP
LOCATION DETAILED: RIGHT SUPERIOR LATERAL NECK
LOCATION DETAILED: RIGHT PROXIMAL CALF
LOCATION DETAILED: LEFT MEDIAL FOREHEAD
LOCATION DETAILED: LEFT TRAGUS
LOCATION DETAILED: RIGHT RADIAL DORSAL HAND
LOCATION DETAILED: RIGHT MEDIAL UPPER BACK
LOCATION DETAILED: RIGHT VENTRAL PROXIMAL FOREARM

## 2019-10-03 ASSESSMENT — LOCATION SIMPLE DESCRIPTION DERM
LOCATION SIMPLE: LEFT FOREARM
LOCATION SIMPLE: RIGHT POPLITEAL SKIN
LOCATION SIMPLE: RIGHT CALF
LOCATION SIMPLE: RIGHT POSTERIOR THIGH
LOCATION SIMPLE: NOSE
LOCATION SIMPLE: LEFT HAND
LOCATION SIMPLE: LEFT FOREHEAD
LOCATION SIMPLE: LEFT FOREARM
LOCATION SIMPLE: ABDOMEN
LOCATION SIMPLE: RIGHT UPPER ARM
LOCATION SIMPLE: SCALP
LOCATION SIMPLE: RIGHT EAR
LOCATION SIMPLE: RIGHT HAND
LOCATION SIMPLE: LEFT UPPER ARM
LOCATION SIMPLE: RIGHT FOREARM
LOCATION SIMPLE: RIGHT CHEEK
LOCATION SIMPLE: NECK
LOCATION SIMPLE: LEFT POSTERIOR THIGH
LOCATION SIMPLE: LEFT CALF
LOCATION SIMPLE: RIGHT UPPER BACK
LOCATION SIMPLE: RIGHT LOWER BACK
LOCATION SIMPLE: LEFT EAR
LOCATION SIMPLE: POSTERIOR SCALP
LOCATION SIMPLE: LEFT LOWER BACK
LOCATION SIMPLE: CHEST
LOCATION SIMPLE: LEFT POPLITEAL SKIN
LOCATION SIMPLE: LEFT ELBOW

## 2019-10-03 ASSESSMENT — LOCATION ZONE DERM
LOCATION ZONE: TRUNK
LOCATION ZONE: HAND
LOCATION ZONE: SCALP
LOCATION ZONE: LEG
LOCATION ZONE: FACE
LOCATION ZONE: NOSE
LOCATION ZONE: EAR
LOCATION ZONE: ARM
LOCATION ZONE: ARM
LOCATION ZONE: NECK

## 2019-10-03 NOTE — PROCEDURE: COUNSELING
Detail Level: Zone
Quality 137: Melanoma: Continuity Of Care - Recall System: Patient information entered into a recall system that includes: target date for the next exam specified AND a process to follow up with patients regarding missed or unscheduled appointments
When Should The Patient Follow-Up For Their Next Full-Body Skin Exam?: 6 Months

## 2019-10-22 ENCOUNTER — OFFICE VISIT (OUTPATIENT)
Dept: MEDICAL GROUP | Age: 74
End: 2019-10-22
Payer: MEDICARE

## 2019-10-22 VITALS
BODY MASS INDEX: 40.16 KG/M2 | OXYGEN SATURATION: 92 % | WEIGHT: 265 LBS | HEART RATE: 60 BPM | TEMPERATURE: 98.4 F | DIASTOLIC BLOOD PRESSURE: 64 MMHG | SYSTOLIC BLOOD PRESSURE: 112 MMHG | HEIGHT: 68 IN

## 2019-10-22 DIAGNOSIS — E66.01 MORBID OBESITY WITH BMI OF 40.0-44.9, ADULT (HCC): ICD-10-CM

## 2019-10-22 DIAGNOSIS — R91.1 LUNG NODULE: ICD-10-CM

## 2019-10-22 DIAGNOSIS — H57.9 EYE PROBLEM: ICD-10-CM

## 2019-10-22 DIAGNOSIS — E03.4 HYPOTHYROIDISM DUE TO ACQUIRED ATROPHY OF THYROID: ICD-10-CM

## 2019-10-22 DIAGNOSIS — Z00.00 MEDICARE ANNUAL WELLNESS VISIT, SUBSEQUENT: ICD-10-CM

## 2019-10-22 DIAGNOSIS — Z12.5 SCREENING FOR PROSTATE CANCER: ICD-10-CM

## 2019-10-22 DIAGNOSIS — G89.29 CHRONIC LEFT HIP PAIN: ICD-10-CM

## 2019-10-22 DIAGNOSIS — M25.552 CHRONIC LEFT HIP PAIN: ICD-10-CM

## 2019-10-22 DIAGNOSIS — R00.1 BRADYCARDIA WITH 51-60 BEATS PER MINUTE: ICD-10-CM

## 2019-10-22 DIAGNOSIS — Z23 NEED FOR VACCINATION: ICD-10-CM

## 2019-10-22 DIAGNOSIS — N40.1 BENIGN PROSTATIC HYPERPLASIA WITH LOWER URINARY TRACT SYMPTOMS, SYMPTOM DETAILS UNSPECIFIED: ICD-10-CM

## 2019-10-22 DIAGNOSIS — E78.5 HYPERLIPIDEMIA LDL GOAL <100: ICD-10-CM

## 2019-10-22 PROBLEM — R09.81 CONGESTION OF PARANASAL SINUS: Status: RESOLVED | Noted: 2017-06-14 | Resolved: 2019-10-22

## 2019-10-22 PROBLEM — M54.2 NECK PAIN, BILATERAL: Status: RESOLVED | Noted: 2017-04-20 | Resolved: 2019-10-22

## 2019-10-22 PROCEDURE — 90662 IIV NO PRSV INCREASED AG IM: CPT | Performed by: INTERNAL MEDICINE

## 2019-10-22 PROCEDURE — G0439 PPPS, SUBSEQ VISIT: HCPCS | Performed by: INTERNAL MEDICINE

## 2019-10-22 PROCEDURE — G0008 ADMIN INFLUENZA VIRUS VAC: HCPCS | Performed by: INTERNAL MEDICINE

## 2019-10-22 SDOH — HEALTH STABILITY: MENTAL HEALTH: HOW OFTEN DO YOU HAVE 6 OR MORE DRINKS ON ONE OCCASION?: NEVER

## 2019-10-22 SDOH — HEALTH STABILITY: MENTAL HEALTH: HOW OFTEN DO YOU HAVE A DRINK CONTAINING ALCOHOL?: NEVER

## 2019-10-22 ASSESSMENT — PATIENT HEALTH QUESTIONNAIRE - PHQ9: CLINICAL INTERPRETATION OF PHQ2 SCORE: 0

## 2019-10-22 ASSESSMENT — ACTIVITIES OF DAILY LIVING (ADL): BATHING_REQUIRES_ASSISTANCE: 0

## 2019-10-22 NOTE — PROGRESS NOTES
Chief Complaint   Patient presents with   • Annual Wellness Visit         HPI:  Dewayne is a 74 y.o. here for Medicare Annual Wellness Visit. Patient reports feeling well and does not have any new complaints.     Benign prostatic hyperplasia with lower urinary tract symptoms, symptom details unspecified  Patient notes that his symptoms are well controlled at this time with his own efforts.  He is taking over-the-counter saw palmetto supplement regularly.  He denies side effects from taking saw palmetto.    Hyperlipidemia LDL goal <100  Chronic in nature and stable. Patient has been taking Atorvastatin 20 mg as prescribed for his dyslipidemia without myalgias, abdominal pain, dizziness, claudication, or chest pain.  Results for DEWAYNE VILLEGAS (MRN [id number]) as of 10/22/2019 14:51   Ref. Range 4/19/2019 08:23   Cholesterol,Tot Latest Ref Range: 100 - 199 mg/dL 126   Triglycerides Latest Ref Range: 0 - 149 mg/dL 132   HDL Latest Ref Range: >=40 mg/dL 40   LDL Latest Ref Range: <100 mg/dL 60     Hypothyroidism due to acquired atrophy of thyroid  Patient reported that he is taking Synthroid 100 mcg every morning on empty stomach as prescribed. He denies signs and symptoms of hypo-or hyperthyroidism.  He denies side effects from taking Synthroid.    Results for DEWAYNE VILLEGAS (MRN [id number]) as of 10/22/2019 14:51   Ref. Range 4/19/2019 08:23   TSH Latest Ref Range: 0.380 - 5.330 uIU/mL 4.160   Free T-4 Latest Ref Range: 0.53 - 1.43 ng/dL 0.93       Bradycardia with 51-60 beats per minute  Patient has sinus bradycardia and it is stable.  He is asymptomatic.  He is not taking any cardiac blockers currently.  His pulse today in office is 60.     Morbid obesity with BMI of 40.0-44.9, adult (HCC)  Patient endorses participating in regular exercise and watching his diet.    Lung nodule  Patient was previously found to have lung nodules in the right middle lobe and the largest one measuring up to 6 mm.  He has CT lung  twice on 10/26/2017 and 10/5/2018 showed stable lung nodules without new changes.  I discussed with patient to stop doing CT lungs for follow-up unless he has new symptoms.  Patient reportes that he does not have any concerning symptoms and he agrees not to repeat CT lungs.    Chronic left hip pain  Patient has chronic left hip pain. He followed up with physiatry.  He states that he has been controlling his left hip pain with exercise without taking any medication. He endorses participating in weight bearing exercising which has helped alleviate his pain.    Eye problem  Patient reported having pressure high slightly on right eye and he follows with ophthalmologist every 6 months.  Patient uses Lumigan as needed as prescribed by his eye doctor. He notes that his symptoms are well controlled at this time. Continues to follow up with his eye doctor.  He denies vision changes or eyeball pain.      Patient Active Problem List    Diagnosis Date Noted   • Chronic left hip pain 04/25/2019   • Lung nodule 06/14/2017   • Morbid obesity with BMI of 40.0-44.9, adult (HCC) 01/20/2017   • Bradycardia with 51-60 beats per minute 10/07/2016   • Hypothyroidism due to acquired atrophy of thyroid 01/31/2016   • Eye problem 01/20/2016   • BPH (benign prostatic hyperplasia) 01/20/2016   • Hyperlipidemia LDL goal <100 01/20/2016   • Preventative health care 01/20/2016       Current Outpatient Medications   Medication Sig Dispense Refill   • atorvastatin (LIPITOR) 20 MG Tab Take 1 Tab by mouth every bedtime. 90 Tab 3   • Saw Palmetto, Serenoa repens, (SAW PALMETTO PO) Take 600 mg by mouth every day.     • SYNTHROID 100 MCG Tab TAKE 1 TABLET EVERY MORNINGON AN EMPTY STOMACH 90 Tab 3   • triamcinolone acetonide (KENALOG) 0.1 % Cream Apply  to affected area(s) 2 times a day.     • bimatoprost (LUMIGAN) 0.01 % Solution Place 1 Drop in both eyes every bedtime.       No current facility-administered medications for this visit.         Patient  is taking medications as noted in medication list.  Current supplements as per medication list.     Allergies: Patient has no known allergies.    Current social contact/activities: Patient spends time with his three boys.     Is patient current with immunizations? No, due for FLU. Patient is interested in receiving FLU today.    He  reports that he quit smoking about 34 years ago. His smoking use included cigarettes. He has never used smokeless tobacco. He reports that he does not drink alcohol or use drugs.  Counseling given: Yes        DPA/Advanced directive: Patient has Advanced Directive on file.     ROS:    Gait: Uses no assistive device   Ostomy: No   Other tubes: No   Amputations: No  Chronic oxygen use No   Last eye exam 10/2019   Wears hearing aids: No  : Denies any urinary leakage during the last 6 months      Screening:    Depression Screening    Little interest or pleasure in doing things?  0 - not at all  Feeling down, depressed, or hopeless? 0 - not at all  Patient Health Questionnaire Score: 0    If depressive symptoms identified deferred to follow up visit unless specifically addressed in assessment and plan.    Interpretation of PHQ-9 Total Score   Score Severity   1-4 No Depression   5-9 Mild Depression   10-14 Moderate Depression   15-19 Moderately Severe Depression   20-27 Severe Depression    Screening for Cognitive Impairment    Three Minute Recall (village, kitchen, baby)  2/3    Jay clock face with all 12 numbers and set the hands to show 10 past 10.  Yes    If cognitive concerns identified, deferred for follow up unless specifically addressed in assessment and plan.    Fall Risk Assessment    Has the patient had two or more falls in the last year or any fall with injury in the last year?  No  If fall risk identified, deferred for follow up unless specifically addressed in assessment and plan.    Safety Assessment    Throw rugs on floor.  No  Handrails on all stairs.  Yes  Good lighting in  all hallways.  Yes  Difficulty hearing.  No  Patient counseled about all safety risks that were identified.    Functional Assessment ADLs    Are there any barriers preventing you from cooking for yourself or meeting nutritional needs?  No.    Are there any barriers preventing you from driving safely or obtaining transportation?  No.    Are there any barriers preventing you from using a telephone or calling for help?  No.    Are there any barriers preventing you from shopping?  No.    Are there any barriers preventing you from taking care of your own finances?  No.    Are there any barriers preventing you from managing your medications?  No.    Are there any barriers preventing you from showering, bathing or dressing yourself?  No.    Are you currently engaging in any exercise or physical activity?  Yes.  Patient states that he swims 40 minutes a day. Patient also does 30 minutes of resistance training. He also does karate and walks a couple times a week.  What is your perception of your health?   .    Health Maintenance Summary                Annual Wellness Visit Overdue 10/27/2018      Done 10/26/2017 Visit Dx: Medicare annual wellness visit, initial    IMM INFLUENZA Overdue 2019      Done 10/2/2018 Imm Admin: Influenza Vaccine Adult HD     Patient has more history with this topic...    IMM DTaP/Tdap/Td Vaccine Postponed 2023 Originally 2013. Other     Done 2013 Imm Admin: TD Vaccine    COLONOSCOPY Next Due 2024      Done 2019 REFERRAL TO GI FOR COLONOSCOPY     Patient has more history with this topic...          Patient Care Team:  Joann Wakefield M.D. as PCP - General (Internal Medicine)  Rosalva Kelly M.D. as Consulting Physician (Ophthalmology)  Verónica Oviedo M.D. as Consulting Physician (Otolaryngology)    Social History     Tobacco Use   • Smoking status: Former Smoker     Types: Cigarettes     Last attempt to quit: 1985     Years since quittin.7   • Smokeless  "tobacco: Never Used   Substance Use Topics   • Alcohol use: No     Alcohol/week: 0.0 oz     Frequency: Never     Binge frequency: Never   • Drug use: No     Family History   Problem Relation Age of Onset   • Hypertension Mother    • Other Father         ALS   • Hypertension Brother      He  has a past medical history of BPH (benign prostatic hyperplasia), Hyperlipidemia, and Thyroid disease.   Past Surgical History:   Procedure Laterality Date   • EYE SURGERY     • HERNIA REPAIR             Exam:     /64 (BP Location: Left arm, Patient Position: Sitting, BP Cuff Size: Adult long)   Pulse 60   Temp 36.9 °C (98.4 °F) (Temporal)   Ht 1.727 m (5' 8\")   Wt 120.2 kg (265 lb)   SpO2 92%  Body mass index is 40.29 kg/m².    Hearing good.    Dentition good  Alert, oriented in no acute distress.  Eye contact is good, speech goal directed, affect calm      Assessment and Plan. The following treatment and monitoring plan is recommended:    1. Medicare annual wellness visit, subsequent  - Patient is here today for AWV. He reports feeling well and does not have any new medical complaints.  - Subsequent Annual Wellness Visit - Includes PPPS ()    2. Benign prostatic hyperplasia with lower urinary tract symptoms, symptom details unspecified  - Well-controlled with over-the-counter supplements. He will continue to manage this through his own efforts.  He declined to take prescription medication currently.  - Subsequent Annual Wellness Visit - Includes PPPS ()    3. Hyperlipidemia LDL goal <100  - Well-controlled. Continue current regimens, atorvastatin 20 mg every evening. Recheck lab 1-2 weeks before next follow up visit.  - Reviewed the risks and benefits of treatment and potential side effects of medication.  - Advised to eat low fat, low carbohydrate and high fiber diet as well as do cardio physical exercise regularly.    - Subsequent Annual Wellness Visit - Includes PPPS ()    4. Hypothyroidism due to " acquired atrophy of thyroid  - Stable on thyroid replacement medications.  He will continue Synthroid 100 mcg every morning on empty stomach. Labs have been ordered for the next follow up visit.  - Subsequent Annual Wellness Visit - Includes PPPS ()    5. Bradycardia with 51-60 beats per minute  - Stable and asymptomatic.  Continue to monitor.  - Subsequent Annual Wellness Visit - Includes PPPS ()    6. Morbid obesity with BMI of 40.0-44.9, adult ([facility])  - Counseled for healthy diet and regular physical exercise to lose weight.  - Patient identified as having weight management issue.  Appropriate orders and counseling given.  - Subsequent Annual Wellness Visit - Includes PPPS ()    7. Lung nodule  - Stable.  Patient had CT chest twice in 2017 and 2019 and reports showed that his nodule was stable.  - Patient does not want to recheck CT chest and thus he has any new concerning symptoms.  - Subsequent Annual Wellness Visit - Includes PPPS ()    8. Chronic left hip pain  - Chronic and improving. He will continue to manage this through regular exercise. Endorses participating in weight bearing exercises for treatment of pain.  He may take Tylenol as needed for pain.  - Subsequent Annual Wellness Visit - Includes PPPS ()    9. Eye problem  - Well controlled. Continue current regimen. Advised to continue to follow up with ophthalmologist as scheduled.  - Subsequent Annual Wellness Visit - Includes PPPS ()    10. Need for vaccination  - Influenza vaccine was given today after reviewing risks and benefits as well as side effects of vaccine.   - INFLUENZA VACCINE, HIGH DOSE (65+ ONLY)  - Subsequent Annual Wellness Visit - Includes PPPS ()    11. Screening for prostate cancer  - Discussed pros and cons of PSA test as well as sensitivity and specificity of the test with patient. He would like to have early detection and request to order PSA test.  - PROSTATE SPECIFIC AG SCREENING; Future  -  Subsequent Annual Wellness Visit - Includes PPPS ()      Services suggested: No services needed at this time  Health Care Screening recommendations as per orders if indicated.  Referrals offered: PT/OT/Nutrition counseling/Behavioral Health/Smoking cessation as per orders if indicated.    Discussion today about general wellness and lifestyle habits:    · Prevent falls and reduce trip hazards; Cautioned about securing or removing rugs.  · Have a working fire alarm and carbon monoxide detector;   · Engage in regular physical activity and social activities       Follow-up: Return in about 3 months (around 1/22/2020) for BPH, Hyperlipidemia, Hypothyroid, Lab review.

## 2019-11-14 DIAGNOSIS — E03.4 HYPOTHYROIDISM DUE TO ACQUIRED ATROPHY OF THYROID: ICD-10-CM

## 2019-11-14 RX ORDER — LEVOTHYROXINE SODIUM 100 MCG
TABLET ORAL
Qty: 90 TAB | Refills: 4 | Status: SHIPPED | OUTPATIENT
Start: 2019-11-14 | End: 2020-02-28 | Stop reason: SDUPTHER

## 2020-01-27 ENCOUNTER — OFFICE VISIT (OUTPATIENT)
Dept: URGENT CARE | Facility: CLINIC | Age: 75
End: 2020-01-27
Payer: MEDICARE

## 2020-01-27 VITALS
HEIGHT: 69 IN | RESPIRATION RATE: 20 BRPM | DIASTOLIC BLOOD PRESSURE: 70 MMHG | WEIGHT: 267 LBS | TEMPERATURE: 98.9 F | SYSTOLIC BLOOD PRESSURE: 118 MMHG | HEART RATE: 72 BPM | OXYGEN SATURATION: 93 % | BODY MASS INDEX: 39.55 KG/M2

## 2020-01-27 DIAGNOSIS — Z20.828 EXPOSURE TO INFLUENZA: ICD-10-CM

## 2020-01-27 DIAGNOSIS — J11.1 INFLUENZA: ICD-10-CM

## 2020-01-27 DIAGNOSIS — R05.9 COUGH: ICD-10-CM

## 2020-01-27 PROCEDURE — 99214 OFFICE O/P EST MOD 30 MIN: CPT | Performed by: NURSE PRACTITIONER

## 2020-01-27 RX ORDER — OSELTAMIVIR PHOSPHATE 75 MG/1
75 CAPSULE ORAL 2 TIMES DAILY
Qty: 10 CAP | Refills: 0 | Status: SHIPPED | OUTPATIENT
Start: 2020-01-27 | End: 2020-02-28

## 2020-01-27 RX ORDER — BENZONATATE 100 MG/1
100 CAPSULE ORAL 3 TIMES DAILY PRN
Qty: 60 CAP | Refills: 0 | Status: SHIPPED | OUTPATIENT
Start: 2020-01-27 | End: 2020-02-28

## 2020-01-27 ASSESSMENT — PAIN SCALES - GENERAL: PAINLEVEL: NO PAIN

## 2020-01-27 NOTE — PROGRESS NOTES
Chief Complaint   Patient presents with   • Cough     exposed with influenza, cough, runny nose, plugged ears x 1 days       HISTORY OF PRESENT ILLNESS: Patient is a 74 y.o. male who presents today due to symptoms which started yesterday with sudden onset. Pt reports a fever, chills, body aches. Reports associated dry cough, sore throat, nasal congestion, headache, and fatigue. Denies blood in sputum, chest pain, shortness of breath, wheezing, nausea, vomiting, or diarrhea. Denies h/o asthma/copd/CAP. No immunocompromise. Has tried OTC cold/flu medications without significant relief of symptoms. No recent ABX use. No other aggravating or alleviating factors. He did receive a flu vaccination this year.  His wife was diagnosed with influenza yesterday.    Patient Active Problem List    Diagnosis Date Noted   • Chronic left hip pain 04/25/2019   • Lung nodule 06/14/2017   • Morbid obesity with BMI of 40.0-44.9, adult (HCC) 01/20/2017   • Bradycardia with 51-60 beats per minute 10/07/2016   • Hypothyroidism due to acquired atrophy of thyroid 01/31/2016   • Eye problem 01/20/2016   • BPH (benign prostatic hyperplasia) 01/20/2016   • Hyperlipidemia LDL goal <100 01/20/2016   • Preventative health care 01/20/2016       Allergies:Patient has no known allergies.    Current Outpatient Medications Ordered in Epic   Medication Sig Dispense Refill   • SYNTHROID 100 MCG Tab TAKE 1 TABLET EVERY MORNINGON AN EMPTY STOMACH 90 Tab 4   • atorvastatin (LIPITOR) 20 MG Tab Take 1 Tab by mouth every bedtime. 90 Tab 3   • Saw Palmetto, Serenoa repens, (SAW PALMETTO PO) Take 600 mg by mouth every day.     • triamcinolone acetonide (KENALOG) 0.1 % Cream Apply  to affected area(s) 2 times a day.     • bimatoprost (LUMIGAN) 0.01 % Solution Place 1 Drop in both eyes every bedtime.       No current Epic-ordered facility-administered medications on file.        Past Medical History:   Diagnosis Date   • BPH (benign prostatic hyperplasia)    •  "Hyperlipidemia    • Thyroid disease        Social History     Tobacco Use   • Smoking status: Former Smoker     Types: Cigarettes     Last attempt to quit: 1985     Years since quittin.0   • Smokeless tobacco: Never Used   Substance Use Topics   • Alcohol use: No     Alcohol/week: 0.0 oz     Frequency: Never     Binge frequency: Never   • Drug use: No       Family Status   Relation Name Status   • Mo     • Fa     • Bro  Alive   • Son  Alive   • Son  Alive   • Son  Alive   • Son  Alive     Family History   Problem Relation Age of Onset   • Hypertension Mother    • Other Father         ALS   • Hypertension Brother        ROS:  Review of Systems   Constitutional: Positive for subjective fever, chills, fatigue, malaise. Negative for weight loss.  HENT: Positive for congestion and sore throat. Negative for ear pain, nosebleeds, and neck pain.    Eyes: Negative for vision changes.   Cardiovascular: Negative for chest pain, palpitations, orthopnea and leg swelling.   Respiratory: Positive for cough without sputum production. Negative for shortness of breath and wheezing.   Gastrointestinal: Negative for abdominal pain, nausea, vomiting or diarrhea.   Skin: Negative for rash, diaphoresis.     Exam:  /70 (BP Location: Left arm, Patient Position: Sitting, BP Cuff Size: Adult long)   Pulse 72   Temp 37.2 °C (98.9 °F) (Temporal)   Resp 20   Ht 1.753 m (5' 9\")   Wt 121.1 kg (267 lb)   SpO2 93%   General: well-nourished, well-developed male, appears uncomfortable, non-toxic in appearance.   Head: normocephalic, atraumatic.  Eyes: PERRLA, EOM within normal limits, no conjunctival injection, no scleral icterus, visual fields and acuity grossly intact.  Ears: normal shape and symmetry, no tenderness, no discharge. External canals are without any significant edema or erythema. Tympanic membranes are without any inflammation, no effusion. Gross auditory acuity is intact.  Nose: symmetrical without " tenderness, mild discharge, erythema present bilateral nares.  Mouth/Throat: reasonable hygiene, no exudates or tonsillar enlargement. Erythema present.   Neck: no masses, range of motion within normal limits, no tracheal deviation.  Lymph: mild cervical adenopathy. No supraclavicular adenopathy.   Neuro: alert and oriented. Cranial nerves 1-12 grossly intact.   Cardiovascular: Regular rate and regular rhythm without murmurs, rubs, or gallops. No edema.   Pulmonary: no distress. Chest is symmetrical with respiration, no wheezes, crackles, or rhonchi.   Musculoskeletal: appropriate muscle tone, gait is stable.  Skin: warm, dry, intact, no clubbing, no cyanosis.   Psych: appropriate mood, affect, judgement.         Assessment/Plan:  1. Exposure to influenza  oseltamivir (TAMIFLU) 75 MG Cap   2. Influenza  oseltamivir (TAMIFLU) 75 MG Cap   3. Cough  benzonatate (TESSALON) 100 MG Cap             Due to exposure to influenza, prevalence of flu in the community, and flulike presentation, I suspect influenza etiology. Tamiflu provided.   Discussed viral etiology, self limiting illness. Did not see any evidence of a bacterial process. Discussed natural progression and sx care. Rest, increase fluid intake, hand and respiratory hygiene. Tessalon for cough.   Supportive care, differential diagnoses, and indications for immediate follow-up discussed with patient.   Pathogenesis of diagnosis discussed including typical length and natural progression.   Instructed to return to clinic or nearest emergency department for any change in condition, further concerns, or worsening of symptoms.  Patient states understanding of the plan of care and discharge instructions.  Instructed to make an appointment, for follow up, with his primary care provider.            Please note that this dictation was created using voice recognition software. I have made every reasonable attempt to correct obvious errors, but I expect that there are errors  of grammar and possibly content that I did not discover before finalizing the note.  A mask is worn during the entire visit with the patient.     FAHEEM Ko.

## 2020-02-22 ENCOUNTER — HOSPITAL ENCOUNTER (OUTPATIENT)
Dept: LAB | Facility: MEDICAL CENTER | Age: 75
End: 2020-02-22
Attending: INTERNAL MEDICINE
Payer: COMMERCIAL

## 2020-02-22 DIAGNOSIS — E78.5 HYPERLIPIDEMIA LDL GOAL <100: ICD-10-CM

## 2020-02-22 DIAGNOSIS — E03.4 HYPOTHYROIDISM DUE TO ACQUIRED ATROPHY OF THYROID: ICD-10-CM

## 2020-02-22 DIAGNOSIS — Z12.5 SCREENING FOR PROSTATE CANCER: ICD-10-CM

## 2020-02-22 LAB
ALBUMIN SERPL BCP-MCNC: 3.9 G/DL (ref 3.2–4.9)
ALBUMIN/GLOB SERPL: 1.3 G/DL
ALP SERPL-CCNC: 80 U/L (ref 30–99)
ALT SERPL-CCNC: 25 U/L (ref 2–50)
ANION GAP SERPL CALC-SCNC: 7 MMOL/L (ref 0–11.9)
AST SERPL-CCNC: 24 U/L (ref 12–45)
BILIRUB SERPL-MCNC: 0.8 MG/DL (ref 0.1–1.5)
BUN SERPL-MCNC: 23 MG/DL (ref 8–22)
CALCIUM SERPL-MCNC: 8.9 MG/DL (ref 8.5–10.5)
CHLORIDE SERPL-SCNC: 106 MMOL/L (ref 96–112)
CHOLEST SERPL-MCNC: 117 MG/DL (ref 100–199)
CO2 SERPL-SCNC: 28 MMOL/L (ref 20–33)
CREAT SERPL-MCNC: 1.09 MG/DL (ref 0.5–1.4)
FASTING STATUS PATIENT QL REPORTED: NORMAL
GLOBULIN SER CALC-MCNC: 3.1 G/DL (ref 1.9–3.5)
GLUCOSE SERPL-MCNC: 97 MG/DL (ref 65–99)
HDLC SERPL-MCNC: 36 MG/DL
LDLC SERPL CALC-MCNC: 63 MG/DL
POTASSIUM SERPL-SCNC: 4.5 MMOL/L (ref 3.6–5.5)
PROT SERPL-MCNC: 7 G/DL (ref 6–8.2)
PSA SERPL-MCNC: 1.15 NG/ML (ref 0–4)
SODIUM SERPL-SCNC: 141 MMOL/L (ref 135–145)
T4 FREE SERPL-MCNC: 0.99 NG/DL (ref 0.53–1.43)
TRIGL SERPL-MCNC: 90 MG/DL (ref 0–149)
TSH SERPL DL<=0.005 MIU/L-ACNC: 3.5 UIU/ML (ref 0.38–5.33)

## 2020-02-22 PROCEDURE — 36415 COLL VENOUS BLD VENIPUNCTURE: CPT

## 2020-02-22 PROCEDURE — 80053 COMPREHEN METABOLIC PANEL: CPT

## 2020-02-22 PROCEDURE — 84443 ASSAY THYROID STIM HORMONE: CPT

## 2020-02-22 PROCEDURE — 80061 LIPID PANEL: CPT

## 2020-02-22 PROCEDURE — 84153 ASSAY OF PSA TOTAL: CPT

## 2020-02-22 PROCEDURE — 84439 ASSAY OF FREE THYROXINE: CPT

## 2020-02-28 ENCOUNTER — OFFICE VISIT (OUTPATIENT)
Dept: MEDICAL GROUP | Age: 75
End: 2020-02-28
Payer: COMMERCIAL

## 2020-02-28 VITALS
HEART RATE: 55 BPM | HEIGHT: 70 IN | TEMPERATURE: 97.5 F | BODY MASS INDEX: 38.48 KG/M2 | SYSTOLIC BLOOD PRESSURE: 122 MMHG | WEIGHT: 268.8 LBS | OXYGEN SATURATION: 94 % | DIASTOLIC BLOOD PRESSURE: 78 MMHG

## 2020-02-28 DIAGNOSIS — R00.1 BRADYCARDIA WITH 51-60 BEATS PER MINUTE: ICD-10-CM

## 2020-02-28 DIAGNOSIS — G89.29 CHRONIC LEFT HIP PAIN: ICD-10-CM

## 2020-02-28 DIAGNOSIS — M25.552 CHRONIC LEFT HIP PAIN: ICD-10-CM

## 2020-02-28 DIAGNOSIS — E78.5 HYPERLIPIDEMIA LDL GOAL <100: ICD-10-CM

## 2020-02-28 DIAGNOSIS — Z76.89 ENCOUNTER TO ESTABLISH CARE WITH NEW DOCTOR: Primary | ICD-10-CM

## 2020-02-28 DIAGNOSIS — E03.4 HYPOTHYROIDISM DUE TO ACQUIRED ATROPHY OF THYROID: ICD-10-CM

## 2020-02-28 DIAGNOSIS — N40.1 BENIGN PROSTATIC HYPERPLASIA WITH LOWER URINARY TRACT SYMPTOMS, SYMPTOM DETAILS UNSPECIFIED: ICD-10-CM

## 2020-02-28 DIAGNOSIS — E66.01 MORBID OBESITY WITH BMI OF 40.0-44.9, ADULT (HCC): ICD-10-CM

## 2020-02-28 PROBLEM — R91.1 LUNG NODULE: Status: RESOLVED | Noted: 2017-06-14 | Resolved: 2020-02-28

## 2020-02-28 PROCEDURE — 99215 OFFICE O/P EST HI 40 MIN: CPT | Performed by: FAMILY MEDICINE

## 2020-02-28 RX ORDER — ATORVASTATIN CALCIUM 20 MG/1
20 TABLET, FILM COATED ORAL
Qty: 90 TAB | Refills: 3 | Status: SHIPPED | OUTPATIENT
Start: 2020-02-28 | End: 2020-06-25 | Stop reason: SDUPTHER

## 2020-02-28 RX ORDER — LEVOTHYROXINE SODIUM 0.1 MG/1
100 TABLET ORAL
Qty: 90 TAB | Refills: 3 | Status: SHIPPED | OUTPATIENT
Start: 2020-02-28 | End: 2020-06-05 | Stop reason: SDUPTHER

## 2020-02-28 ASSESSMENT — PATIENT HEALTH QUESTIONNAIRE - PHQ9: CLINICAL INTERPRETATION OF PHQ2 SCORE: 0

## 2020-02-28 NOTE — PROGRESS NOTES
CC: establish care.     HPI:     Price Moon is a 74 y.o. male, new patient to the clinic and would like to establish care. This is a former patient of Dr. Joann Wakefield.    The patient is doing overall well. Patient has history of stable lung nodule according to CT chest in October 2017 and 2018. He denies any shortness of breath or new symptoms, and was instructed by previous PCP that another CT is not indicated due to being asymptomatic. Patient has chronic left hip pain due to previous muscle tear, that is improved with daily exercises on his own such as swim spa and weight bearing exercises. He does not attend physical therapy. The patient states he is still having difficulties with weight loss. The patient was a former smoker and quit in 1985. He is , and sexually active with one female partner.     Patient has history of benign prostatic hyperplasia. He reports symptoms are improved with taking OTC Saw Palmetto 600  mg, and states he only wakes up once in the night to void urine instead of 3-4 without medication. He states he is unable to fully void his bladder, however does not need to bear down to void urine. He previously had two biopsies that were normal.     The patient has chronic history of hyperlipidemia and hypothyroidism, currently taking atorvastatin 20 mg and Synthroid 100 mcg. Denies any medication side effects. Reviewed blood work with the patient completed on 2/22/20 which demonstrate cholesterol, thyroid, kidney and liver functions are within normal limits.     Shingrix vaccine is UTD. The patient has history of 7 mm pre-cancerous polyp according to previous colonoscopy.     Current medicines (including changes today)  Current Outpatient Medications   Medication Sig Dispense Refill   • atorvastatin (LIPITOR) 20 MG Tab Take 1 Tab by mouth every bedtime. 90 Tab 3   • levothyroxine (SYNTHROID) 100 MCG Tab Take 1 Tab by mouth Every morning on an empty stomach. 90 Tab 3   • Saw  Palmetto, Serenoa repens, (SAW PALMETTO PO) Take 600 mg by mouth every day.     • triamcinolone acetonide (KENALOG) 0.1 % Cream Apply  to affected area(s) 2 times a day.     • bimatoprost (LUMIGAN) 0.01 % Solution Place 1 Drop in both eyes every bedtime.       No current facility-administered medications for this visit.      He  has a past medical history of BPH (benign prostatic hyperplasia), Hyperlipidemia, and Thyroid disease.  He  has a past surgical history that includes hernia repair and eye surgery.  Social History     Tobacco Use   • Smoking status: Former Smoker     Types: Cigarettes     Last attempt to quit: 1985     Years since quittin.1   • Smokeless tobacco: Never Used   Substance Use Topics   • Alcohol use: No     Alcohol/week: 0.0 oz     Frequency: Never     Binge frequency: Never   • Drug use: No     Social History     Social History Narrative   • Not on file     Family History   Problem Relation Age of Onset   • Hypertension Mother    • Other Father         ALS   • Hypertension Brother      Family Status   Relation Name Status   • Mo     • Fa     • Bro  Alive   • Son  Alive   • Son  Alive   • Son  Alive   • Son  Alive       I personally reviewed patient's problem list, allergies, medications, family hx, social hx with patient and update EPIC.     REVIEW OF SYSTEMS:  CONSTITUTIONAL:  Denies night sweats, fatigue, malaise, lethargy, fever or chills.  RESPIRATORY:  Denies cough, wheeze, hemoptysis, or shortness of breath.  CARDIOVASCULAR:  Denies chest pains, palpitations, pedal edema  GASTROINTESTINAL:  Denies abdominal pain, nausea or vomiting, diarrhea, constipation, hematemesis, hematochezia, melena.  GENITOURINARY:  Denies urinary urgency, frequency, dysuria, or hematuria.  No obstructive symptoms.  Denies unusual discharge.    All other systems reviewed and are negative     Objective:     /78 (BP Location: Right arm, Patient Position: Sitting, BP Cuff Size: Adult)   " Pulse (!) 55   Temp 36.4 °C (97.5 °F) (Temporal)   Ht 1.765 m (5' 9.5\")   Wt 121.9 kg (268 lb 12.8 oz)   SpO2 94%  Body mass index is 39.13 kg/m².  Physical Exam:    Constitutional: Awake, alert, in no apparent distress.  Skin: Warm, dry, good turgor, no rashes/jaundice in visible areas.  Eye: PERRL, intact EOM, conjunctiva clear, lids normal.  ENMT: TM and auditory canal wnl, nasal & oral mucosa wnl, lips without lesions, good dentition, oropharynx clear.  Neck: Trachea midline, no masses, no thyromegaly. No cervical or supraclavicular lymphadenopathy.  Respiratory: Unlabored respiratory effort, lungs clear to auscultation, no wheezes, no rales.  Cardiovascular: Normal S1, S2, no murmur, no rubs, no gallops, no pedal edema.  Psych: Alert and oriented x3, affect and mood wnl, intact judgement and insight.       Assessment and Plan:   The following treatment plan was discussed    1. Hyperlipidemia   Chronic, controlled with Lipitor 20 mg qd, no s/e reported, will continue.    - atorvastatin (LIPITOR) 20 MG Tab; Take 1 Tab by mouth every bedtime.  Dispense: 90 Tab; Refill: 3  - recommended dietary modification, exercise, and weight loss.      2. Hypothyroidism due to acquired atrophy of thyroid  Chronic, controlled with Levothyroxine 100 mcg qd, normal TSH in 2/2020, no s/e reported, will continue.    - levothyroxine (SYNTHROID) 100 MCG Tab; Take 1 Tab by mouth Every morning on an empty stomach.  Dispense: 90 Tab; Refill: 3    3. Benign prostatic hyperplasia with lower urinary tract symptoms, symptom details unspecified  Chronic, controlled with Saw Palmetto, s/p biopsy x2 in the past, both were benign. He declined pharmacotherapy at this time.     4. Morbid obesity with BMI of 40.0-44.9, adult (HCC)  - Patient identified as having weight management issue.  Appropriate orders and counseling given.     5. Bradycardia with 51-60 beats per minute  Asymptomatic, will monitor.     6. Chronic left hip pain  Chronic, " controlled with regular exercise, activity modification, and OTC meds PRN. He is asymptomatic currently.   - cont regular exercises   - OTC analgesics PRN for pain   - weight loss  - icing, rest, activity modification     7. Encounter to establish care with new doctor  General health and wellness counseling provided. Topics might include: diet, exercise, vitamin supplement, mental health, sleep, stress, preventive cares and vaccine recommendations.      Patient was seen for 40 minutes face to face of which greater than 50% of appointment time was spent on counseling and coordination of care regarding the above      Shanda Lu M.D.    Records requested.  Followup: Return in about 6 months (around 8/28/2020).    Please note that this dictation was created using voice recognition software and/or scribes. I have made every reasonable attempt to correct obvious errors, but I expect that there are errors of grammar and possibly content that I did not discover before finalizing the note.     I, Marita Yen (Scribe), am scribing for, and in the presence of, Shanda Lu M.D.    Electronically signed by: Marita Yen (Scribe), 2/28/2020    IShanda M.D. personally performed the services described in this documentation, as scribed by Marita Yen in my presence, and it is both accurate and complete.

## 2020-06-05 DIAGNOSIS — E03.4 HYPOTHYROIDISM DUE TO ACQUIRED ATROPHY OF THYROID: ICD-10-CM

## 2020-06-05 RX ORDER — LEVOTHYROXINE SODIUM 0.1 MG/1
100 TABLET ORAL
Qty: 90 TAB | Refills: 1 | Status: SHIPPED | OUTPATIENT
Start: 2020-06-05 | End: 2020-10-29 | Stop reason: SDUPTHER

## 2020-06-05 NOTE — TELEPHONE ENCOUNTER
Received request via: Patient    Was the patient seen in the last year in this department? Yes    Does the patient have an active prescription (recently filled or refills available) for medication(s) requested? yes but would like sent new pharmacy       Pt stated they would like generic brand name using code 5 so copay is cheaper

## 2020-06-25 DIAGNOSIS — E78.5 HYPERLIPIDEMIA LDL GOAL <100: ICD-10-CM

## 2020-06-25 RX ORDER — ATORVASTATIN CALCIUM 20 MG/1
20 TABLET, FILM COATED ORAL
Qty: 90 TAB | Refills: 3 | Status: SHIPPED | OUTPATIENT
Start: 2020-06-25 | End: 2021-04-26 | Stop reason: SDUPTHER

## 2020-06-25 NOTE — TELEPHONE ENCOUNTER
Received request via: Pharmacy    Was the patient seen in the last year in this department? Yes    Does the patient have an active prescription (recently filled or refills available) for medication(s) requested? yes but would like sent to new pharmacy

## 2020-07-14 NOTE — DISCHARGE INSTRUCTIONS
Bronchitis  You can add OTC NSAIDs, Mucinex, phenylephrine, nasal rinses to your current management.    Please start the antibiotics as previously prescribed      Bronchitis is the body's way of reacting to injury and/or infection (inflammation) of the bronchi. Bronchi are the air tubes that extend from the windpipe into the lungs. If the inflammation becomes severe, it may cause shortness of breath.  CAUSES   Inflammation may be caused by:  · A virus.  · Germs (bacteria).  · Dust.  · Allergens.  · Pollutants and many other irritants.  The cells lining the bronchial tree are covered with tiny hairs (cilia). These constantly beat upward, away from the lungs, toward the mouth. This keeps the lungs free of pollutants. When these cells become too irritated and are unable to do their job, mucus begins to develop. This causes the characteristic cough of bronchitis. The cough clears the lungs when the cilia are unable to do their job. Without either of these protective mechanisms, the mucus would settle in the lungs. Then you would develop pneumonia.  Smoking is a common cause of bronchitis and can contribute to pneumonia. Stopping this habit is the single most important thing you can do to help yourself.  TREATMENT   · Your caregiver may prescribe an antibiotic if the cough is caused by bacteria. Also, medicines that open up your airways make it easier to breathe. Your caregiver may also recommend or prescribe an expectorant. It will loosen the mucus to be coughed up. Only take over-the-counter or prescription medicines for pain, discomfort, or fever as directed by your caregiver.  · Removing whatever causes the problem (smoking, for example) is critical to preventing the problem from getting worse.  · Cough suppressants may be prescribed for relief of cough symptoms.  · Inhaled medicines may be prescribed to help with symptoms now and to help prevent problems from returning.  · For those with recurrent (chronic)  bronchitis, there may be a need for steroid medicines.  SEEK IMMEDIATE MEDICAL CARE IF:   · During treatment, you develop more pus-like mucus (purulent sputum).  · You have a fever.  · Your baby is older than 3 months with a rectal temperature of 102° F (38.9° C) or higher.  · Your baby is 3 months old or younger with a rectal temperature of 100.4° F (38° C) or higher.  · You become progressively more ill.  · You have increased difficulty breathing, wheezing, or shortness of breath.  It is necessary to seek immediate medical care if you are elderly or sick from any other disease.  MAKE SURE YOU:   · Understand these instructions.  · Will watch your condition.  · Will get help right away if you are not doing well or get worse.  Document Released: 12/18/2006 Document Revised: 03/11/2013 Document Reviewed: 10/27/2009  [employer]® Patient Information ©2014 [employer].  Gastroesophageal Reflux Disease, Adult  Gastroesophageal reflux disease (GERD) happens when acid from your stomach goes into your food pipe (esophagus). The acid can cause a burning feeling in your chest. Over time, the acid can make small holes (ulcers) in your food pipe.   HOME CARE  · Ask your doctor for advice about:  ¨ Losing weight.  ¨ Quitting smoking.  ¨ Alcohol use.  · Avoid foods and drinks that make your problems worse. You may want to avoid:  ¨ Caffeine and alcohol.  ¨ Chocolate.  ¨ Mints.  ¨ Garlic and onions.  ¨ Spicy foods.  ¨ Citrus fruits, such as oranges, julian, or limes.  ¨ Foods that contain tomato, such as sauce, chili, salsa, and pizza.  ¨ Fried and fatty foods.  · Avoid lying down for 3 hours before you go to bed or before you take a nap.  · Eat small meals often, instead of large meals.  · Wear loose-fitting clothing. Do not wear anything tight around your waist.  · Raise (elevate) the head of your bed 6 to 8 inches with wood blocks. Using extra pillows does not help.  · Only take medicines as told by your doctor.  · Do not take  aspirin or ibuprofen.  GET HELP RIGHT AWAY IF:   · You have pain in your arms, neck, jaw, teeth, or back.  · Your pain gets worse or changes.  · You feel sick to your stomach (nauseous), throw up (vomit), or sweat (diaphoresis).  · You feel short of breath, or you pass out (faint).  · Your throw up is green, yellow, black, or looks like coffee grounds or blood.  · Your poop (stool) is red, bloody, or black.  MAKE SURE YOU:   · Understand these instructions.  · Will watch your condition.  · Will get help right away if you are not doing well or get worse.     This information is not intended to replace advice given to you by your health care provider. Make sure you discuss any questions you have with your health care provider.     Document Released: 06/05/2009 Document Revised: 03/11/2013 Document Reviewed: 04/13/2016  [employer] Interactive Patient Education ©2016 [employer]  Sinusitis, Adult  Sinusitis is redness, soreness, and inflammation of the paranasal sinuses. Paranasal sinuses are air pockets within the bones of your face. They are located beneath your eyes, in the middle of your forehead, and above your eyes. In healthy paranasal sinuses, mucus is able to drain out, and air is able to circulate through them by way of your nose. However, when your paranasal sinuses are inflamed, mucus and air can become trapped. This can allow bacteria and other germs to grow and cause infection.  Sinusitis can develop quickly and last only a short time (acute) or continue over a long period (chronic). Sinusitis that lasts for more than 12 weeks is considered chronic.  CAUSES  Causes of sinusitis include:  · Allergies.  · Structural abnormalities, such as displacement of the cartilage that separates your nostrils (deviated septum), which can decrease the air flow through your nose and sinuses and affect sinus drainage.  · Functional abnormalities, such as when the small hairs (cilia) that line your sinuses and help remove  mucus do not work properly or are not present.  SIGNS AND SYMPTOMS  Symptoms of acute and chronic sinusitis are the same. The primary symptoms are pain and pressure around the affected sinuses. Other symptoms include:  · Upper toothache.  · Earache.  · Headache.  · Bad breath.  · Decreased sense of smell and taste.  · A cough, which worsens when you are lying flat.  · Fatigue.  · Fever.  · Thick drainage from your nose, which often is green and may contain pus (purulent).  · Swelling and warmth over the affected sinuses.  DIAGNOSIS  Your health care provider will perform a physical exam. During your exam, your health care provider may perform any of the following to help determine if you have acute sinusitis or chronic sinusitis:  · Look in your nose for signs of abnormal growths in your nostrils (nasal polyps).  · Tap over the affected sinus to check for signs of infection.  · View the inside of your sinuses using an imaging device that has a light attached (endoscope).  If your health care provider suspects that you have chronic sinusitis, one or more of the following tests may be recommended:  · Allergy tests.  · Nasal culture. A sample of mucus is taken from your nose, sent to a lab, and screened for bacteria.  · Nasal cytology. A sample of mucus is taken from your nose and examined by your health care provider to determine if your sinusitis is related to an allergy.  TREATMENT  Most cases of acute sinusitis are related to a viral infection and will resolve on their own within 10 days. Sometimes, medicines are prescribed to help relieve symptoms of both acute and chronic sinusitis. These may include pain medicines, decongestants, nasal steroid sprays, or saline sprays.  However, for sinusitis related to a bacterial infection, your health care provider will prescribe antibiotic medicines. These are medicines that will help kill the bacteria causing the infection.  Rarely, sinusitis is caused by a fungal  infection. In these cases, your health care provider will prescribe antifungal medicine.  For some cases of chronic sinusitis, surgery is needed. Generally, these are cases in which sinusitis recurs more than 3 times per year, despite other treatments.  HOME CARE INSTRUCTIONS  · Drink plenty of water. Water helps thin the mucus so your sinuses can drain more easily.  · Use a humidifier.  · Inhale steam 3-4 times a day (for example, sit in the bathroom with the shower running).  · Apply a warm, moist washcloth to your face 3-4 times a day, or as directed by your health care provider.  · Use saline nasal sprays to help moisten and clean your sinuses.  · Take medicines only as directed by your health care provider.  · If you were prescribed either an antibiotic or antifungal medicine, finish it all even if you start to feel better.  SEEK IMMEDIATE MEDICAL CARE IF:  · You have increasing pain or severe headaches.  · You have nausea, vomiting, or drowsiness.  · You have swelling around your face.  · You have vision problems.  · You have a stiff neck.  · You have difficulty breathing.     This information is not intended to replace advice given to you by your health care provider. Make sure you discuss any questions you have with your health care provider.     Document Released: 12/18/2006 Document Revised: 01/08/2016 Document Reviewed: 01/01/2013  [employer] Interactive Patient Education ©2016 [employer]     Bactrim Counseling:  I discussed with the patient the risks of sulfa antibiotics including but not limited to GI upset, allergic reaction, drug rash, diarrhea, dizziness, photosensitivity, and yeast infections.  Rarely, more serious reactions can occur including but not limited to aplastic anemia, agranulocytosis, methemoglobinemia, blood dyscrasias, liver or kidney failure, lung infiltrates or desquamative/blistering drug rashes.

## 2020-10-09 ENCOUNTER — APPOINTMENT (RX ONLY)
Dept: URBAN - METROPOLITAN AREA CLINIC 4 | Facility: CLINIC | Age: 75
Setting detail: DERMATOLOGY
End: 2020-10-09

## 2020-10-09 DIAGNOSIS — D18.0 HEMANGIOMA: ICD-10-CM

## 2020-10-09 DIAGNOSIS — L81.4 OTHER MELANIN HYPERPIGMENTATION: ICD-10-CM

## 2020-10-09 DIAGNOSIS — L82.1 OTHER SEBORRHEIC KERATOSIS: ICD-10-CM

## 2020-10-09 DIAGNOSIS — D22 MELANOCYTIC NEVI: ICD-10-CM

## 2020-10-09 DIAGNOSIS — L57.0 ACTINIC KERATOSIS: ICD-10-CM

## 2020-10-09 DIAGNOSIS — Z85.820 PERSONAL HISTORY OF MALIGNANT MELANOMA OF SKIN: ICD-10-CM

## 2020-10-09 PROBLEM — D48.5 NEOPLASM OF UNCERTAIN BEHAVIOR OF SKIN: Status: ACTIVE | Noted: 2020-10-09

## 2020-10-09 PROBLEM — D22.71 MELANOCYTIC NEVI OF RIGHT LOWER LIMB, INCLUDING HIP: Status: ACTIVE | Noted: 2020-10-09

## 2020-10-09 PROBLEM — D22.5 MELANOCYTIC NEVI OF TRUNK: Status: ACTIVE | Noted: 2020-10-09

## 2020-10-09 PROBLEM — D22.72 MELANOCYTIC NEVI OF LEFT LOWER LIMB, INCLUDING HIP: Status: ACTIVE | Noted: 2020-10-09

## 2020-10-09 PROBLEM — D22.61 MELANOCYTIC NEVI OF RIGHT UPPER LIMB, INCLUDING SHOULDER: Status: ACTIVE | Noted: 2020-10-09

## 2020-10-09 PROBLEM — D22.62 MELANOCYTIC NEVI OF LEFT UPPER LIMB, INCLUDING SHOULDER: Status: ACTIVE | Noted: 2020-10-09

## 2020-10-09 PROBLEM — D18.01 HEMANGIOMA OF SKIN AND SUBCUTANEOUS TISSUE: Status: ACTIVE | Noted: 2020-10-09

## 2020-10-09 PROCEDURE — ? OBSERVATION

## 2020-10-09 PROCEDURE — 99214 OFFICE O/P EST MOD 30 MIN: CPT | Mod: 25

## 2020-10-09 PROCEDURE — ? COUNSELING

## 2020-10-09 PROCEDURE — 11102 TANGNTL BX SKIN SINGLE LES: CPT | Mod: 59

## 2020-10-09 PROCEDURE — 17004 DESTROY PREMAL LESIONS 15/>: CPT

## 2020-10-09 PROCEDURE — ? LIQUID NITROGEN

## 2020-10-09 PROCEDURE — ? BIOPSY BY SHAVE METHOD

## 2020-10-09 ASSESSMENT — LOCATION DETAILED DESCRIPTION DERM
LOCATION DETAILED: RIGHT LATERAL FOREHEAD
LOCATION DETAILED: LEFT VENTRAL DISTAL FOREARM
LOCATION DETAILED: LEFT PROXIMAL CALF
LOCATION DETAILED: LEFT SUPERIOR FOREHEAD
LOCATION DETAILED: RIGHT FOREHEAD
LOCATION DETAILED: LEFT INFERIOR FOREHEAD
LOCATION DETAILED: RIGHT MEDIAL INFERIOR CHEST
LOCATION DETAILED: LEFT CENTRAL ZYGOMA
LOCATION DETAILED: PERIUMBILICAL SKIN
LOCATION DETAILED: LEFT DISTAL POSTERIOR THIGH
LOCATION DETAILED: RIGHT VENTRAL DISTAL FOREARM
LOCATION DETAILED: RIGHT SUPERIOR HELIX
LOCATION DETAILED: LEFT MEDIAL FOREHEAD
LOCATION DETAILED: SUPERIOR THORACIC SPINE
LOCATION DETAILED: LEFT SUPERIOR OCCIPITAL SCALP
LOCATION DETAILED: LEFT VENTRAL DISTAL FOREARM
LOCATION DETAILED: RIGHT INFERIOR HELIX
LOCATION DETAILED: LEFT RADIAL DORSAL HAND
LOCATION DETAILED: LEFT ANTERIOR EARLOBE
LOCATION DETAILED: LEFT ANTERIOR DISTAL UPPER ARM
LOCATION DETAILED: LEFT FOREHEAD
LOCATION DETAILED: LEFT CYMBA CONCHA
LOCATION DETAILED: RIGHT CENTRAL PARIETAL SCALP
LOCATION DETAILED: RIGHT MEDIAL FRONTAL SCALP
LOCATION DETAILED: RIGHT SUPERIOR FOREHEAD
LOCATION DETAILED: RIGHT PROXIMAL CALF
LOCATION DETAILED: RIGHT ANTERIOR PROXIMAL UPPER ARM
LOCATION DETAILED: RIGHT SUPERIOR PARIETAL SCALP
LOCATION DETAILED: LEFT SUPERIOR HELIX
LOCATION DETAILED: RIGHT INFERIOR LATERAL FOREHEAD
LOCATION DETAILED: LEFT SUPERIOR CRUS OF ANTIHELIX
LOCATION DETAILED: RIGHT INFERIOR MEDIAL MIDBACK
LOCATION DETAILED: RIGHT POPLITEAL SKIN
LOCATION DETAILED: RIGHT MEDIAL UPPER BACK
LOCATION DETAILED: LEFT SUPERIOR PARIETAL SCALP
LOCATION DETAILED: RIGHT INFERIOR UPPER BACK
LOCATION DETAILED: LEFT SCAPHA
LOCATION DETAILED: LEFT MEDIAL SUPERIOR CHEST
LOCATION DETAILED: RIGHT ANTERIOR DISTAL UPPER ARM
LOCATION DETAILED: RIGHT SUPERIOR OCCIPITAL SCALP
LOCATION DETAILED: LEFT SUPERIOR MEDIAL MIDBACK
LOCATION DETAILED: LEFT SUPERIOR CENTRAL MALAR CHEEK
LOCATION DETAILED: LEFT ANTECUBITAL SKIN
LOCATION DETAILED: RIGHT VENTRAL PROXIMAL FOREARM
LOCATION DETAILED: LEFT CENTRAL PARIETAL SCALP
LOCATION DETAILED: LEFT SUPERIOR FRONTAL SCALP
LOCATION DETAILED: LEFT POPLITEAL SKIN
LOCATION DETAILED: LEFT LATERAL ZYGOMA
LOCATION DETAILED: EPIGASTRIC SKIN
LOCATION DETAILED: XIPHOID
LOCATION DETAILED: RIGHT DISTAL POSTERIOR THIGH
LOCATION DETAILED: RIGHT SUPERIOR MEDIAL FOREHEAD
LOCATION DETAILED: RIGHT RADIAL DORSAL HAND

## 2020-10-09 ASSESSMENT — LOCATION SIMPLE DESCRIPTION DERM
LOCATION SIMPLE: RIGHT POPLITEAL SKIN
LOCATION SIMPLE: LEFT CALF
LOCATION SIMPLE: LEFT FOREHEAD
LOCATION SIMPLE: RIGHT EAR
LOCATION SIMPLE: RIGHT SCALP
LOCATION SIMPLE: RIGHT OCCIPITAL SCALP
LOCATION SIMPLE: RIGHT FOREARM
LOCATION SIMPLE: LEFT LOWER BACK
LOCATION SIMPLE: CHEST
LOCATION SIMPLE: RIGHT FOREHEAD
LOCATION SIMPLE: RIGHT CALF
LOCATION SIMPLE: LEFT POSTERIOR THIGH
LOCATION SIMPLE: LEFT OCCIPITAL SCALP
LOCATION SIMPLE: LEFT FOREARM
LOCATION SIMPLE: LEFT UPPER ARM
LOCATION SIMPLE: LEFT POPLITEAL SKIN
LOCATION SIMPLE: LEFT HAND
LOCATION SIMPLE: UPPER BACK
LOCATION SIMPLE: LEFT CHEEK
LOCATION SIMPLE: ABDOMEN
LOCATION SIMPLE: LEFT EAR
LOCATION SIMPLE: RIGHT HAND
LOCATION SIMPLE: LEFT FOREARM
LOCATION SIMPLE: RIGHT LOWER BACK
LOCATION SIMPLE: LEFT ELBOW
LOCATION SIMPLE: RIGHT POSTERIOR THIGH
LOCATION SIMPLE: RIGHT UPPER BACK
LOCATION SIMPLE: RIGHT UPPER ARM
LOCATION SIMPLE: LEFT ZYGOMA
LOCATION SIMPLE: SCALP

## 2020-10-09 ASSESSMENT — LOCATION ZONE DERM
LOCATION ZONE: HAND
LOCATION ZONE: ARM
LOCATION ZONE: LEG
LOCATION ZONE: ARM
LOCATION ZONE: SCALP
LOCATION ZONE: TRUNK
LOCATION ZONE: EAR
LOCATION ZONE: FACE

## 2020-10-09 NOTE — PROCEDURE: MIPS QUALITY
Quality 138: Melanoma: Coordination Of Care: A treatment plan was communicated to the physicians providing continuing care within one month of diagnosis outlining: diagnosis, tumor thickness and a plan for surgery or alternate care.
Quality 110: Preventive Care And Screening: Influenza Immunization: Influenza Immunization Administered during Influenza season
Quality 226: Preventive Care And Screening: Tobacco Use: Screening And Cessation Intervention: Patient screened for tobacco use and is an ex/non-smoker
Quality 130: Documentation Of Current Medications In The Medical Record: Current Medications Documented
Quality 111:Pneumonia Vaccination Status For Older Adults: Pneumococcal Vaccination Previously Received
Quality 397: Melanoma: Reporting: The pathology report includes a pT Category and statement on thickness and ulceration for pT1, mitotic rate.
Quality 137: Melanoma: Continuity Of Care - Recall System: Patient information entered into a recall system that includes: target date for the next exam specified AND a process to follow up with patients regarding missed or unscheduled appointments
Detail Level: Detailed

## 2020-10-09 NOTE — PROCEDURE: BIOPSY BY SHAVE METHOD
Detail Level: Detailed
Depth Of Biopsy: dermis
Was A Bandage Applied: Yes
Size Of Lesion In Cm: 0.8
X Size Of Lesion In Cm: 0
Biopsy Type: H and E
Biopsy Method: Personna blade
Anesthesia Type: 1% lidocaine with epinephrine
Anesthesia Volume In Cc: 1
Hemostasis: Electrocautery
Wound Care: Vaseline
Dressing: Band-Aid
Destruction After The Procedure: No
Type Of Destruction Used: Electrodesiccation
Curettage Text: The wound bed was treated with curettage after the biopsy was performed.
Cryotherapy Text: The wound bed was treated with cryotherapy after the biopsy was performed.
Electrodesiccation Text: The wound bed was treated with electrodesiccation after the biopsy was performed.
Electrodesiccation And Curettage Text: The wound bed was treated with electrodesiccation and curettage after the biopsy was performed.
Silver Nitrate Text: The wound bed was treated with silver nitrate after the biopsy was performed.
Lab: [id number]
Lab Facility: 
Consent: Written consent was obtained and risks were reviewed including but not limited to scarring, infection, bleeding, scabbing, incomplete removal, nerve damage and allergy to anesthesia.
Post-Care Instructions: I reviewed with the patient in detail post-care instructions. Patient is to keep the biopsy site dry overnight, and then apply bacitracin/petroleum  twice daily until healed. Patient may apply hydrogen peroxide soaks to remove any crusting.
Notification Instructions: Patient will be notified of biopsy results. However, patient instructed to call the office if not contacted within 2 weeks.
Billing Type: Third-Party Bill
Information: Selecting Yes will display possible errors in your note based on the variables you have selected. This validation is only offered as a suggestion for you. PLEASE NOTE THAT THE VALIDATION TEXT WILL BE REMOVED WHEN YOU FINALIZE YOUR NOTE. IF YOU WANT TO FAX A PRELIMINARY NOTE YOU WILL NEED TO TOGGLE THIS TO 'NO' IF YOU DO NOT WANT IT IN YOUR FAXED NOTE.

## 2020-10-12 ENCOUNTER — TELEPHONE (OUTPATIENT)
Dept: PHYSICAL THERAPY | Facility: REHABILITATION | Age: 75
End: 2020-10-12

## 2020-10-12 NOTE — OP THERAPY DISCHARGE SUMMARY
Outpatient Physical Therapy  DISCHARGE SUMMARY NOTE      [facility] [address]  [address]  [place] [address]  Phone:  [phone]  Fax:  [phone]    Date of Visit: 10/12/2020    Patient: Price Moon  YOB: 1945  MRN: [id number]     Referring Provider: No referring provider defined for this encounter.   Referring Diagnosis No admission diagnoses are documented for this encounter.         Functional Assessment Used        Your patient is being discharged from Physical Therapy with the following comments:   · other    Comments:  This patient was seen by this clinic for therapy with Cesar Gomes PT. The clinician is no longer with this office and the patient has not been seen in more than 30 days.  They will be discharged on his behalf.        Samantha Forte, PT, DPT    Date: 10/12/2020

## 2020-10-29 ENCOUNTER — TELEMEDICINE (OUTPATIENT)
Dept: MEDICAL GROUP | Age: 75
End: 2020-10-29
Payer: COMMERCIAL

## 2020-10-29 VITALS
SYSTOLIC BLOOD PRESSURE: 143 MMHG | DIASTOLIC BLOOD PRESSURE: 93 MMHG | HEART RATE: 61 BPM | BODY MASS INDEX: 37.65 KG/M2 | HEIGHT: 70 IN | WEIGHT: 263 LBS

## 2020-10-29 DIAGNOSIS — Z23 NEED FOR VACCINATION: ICD-10-CM

## 2020-10-29 DIAGNOSIS — N40.1 BENIGN PROSTATIC HYPERPLASIA WITH LOWER URINARY TRACT SYMPTOMS, SYMPTOM DETAILS UNSPECIFIED: ICD-10-CM

## 2020-10-29 DIAGNOSIS — Z01.83 ENCOUNTER FOR BLOOD TYPING: ICD-10-CM

## 2020-10-29 DIAGNOSIS — E78.00 PURE HYPERCHOLESTEROLEMIA: ICD-10-CM

## 2020-10-29 DIAGNOSIS — E03.4 HYPOTHYROIDISM DUE TO ACQUIRED ATROPHY OF THYROID: ICD-10-CM

## 2020-10-29 DIAGNOSIS — Z12.5 ENCOUNTER FOR PROSTATE CANCER SCREENING: ICD-10-CM

## 2020-10-29 DIAGNOSIS — R03.0 ELEVATED BLOOD PRESSURE READING WITHOUT DIAGNOSIS OF HYPERTENSION: ICD-10-CM

## 2020-10-29 DIAGNOSIS — M25.552 CHRONIC LEFT HIP PAIN: ICD-10-CM

## 2020-10-29 DIAGNOSIS — G89.29 CHRONIC LEFT HIP PAIN: ICD-10-CM

## 2020-10-29 DIAGNOSIS — E66.9 OBESITY (BMI 30-39.9): ICD-10-CM

## 2020-10-29 DIAGNOSIS — Z00.00 MEDICARE ANNUAL WELLNESS VISIT, SUBSEQUENT: Primary | ICD-10-CM

## 2020-10-29 PROCEDURE — G0439 PPPS, SUBSEQ VISIT: HCPCS | Mod: 95,CR | Performed by: FAMILY MEDICINE

## 2020-10-29 RX ORDER — LEVOTHYROXINE SODIUM 0.1 MG/1
100 TABLET ORAL
Qty: 90 TAB | Refills: 1 | Status: SHIPPED | OUTPATIENT
Start: 2020-10-29 | End: 2021-04-26 | Stop reason: SDUPTHER

## 2020-10-29 ASSESSMENT — PATIENT HEALTH QUESTIONNAIRE - PHQ9
SUM OF ALL RESPONSES TO PHQ QUESTIONS 1-9: 0
CLINICAL INTERPRETATION OF PHQ2 SCORE: 0

## 2020-10-29 ASSESSMENT — PAIN SCALES - GENERAL: PAINLEVEL: NO PAIN

## 2020-10-29 ASSESSMENT — FIBROSIS 4 INDEX: FIB4 SCORE: 2.352941176470588235

## 2020-10-29 ASSESSMENT — ENCOUNTER SYMPTOMS: GENERAL WELL-BEING: EXCELLENT

## 2020-10-29 ASSESSMENT — ACTIVITIES OF DAILY LIVING (ADL): BATHING_REQUIRES_ASSISTANCE: 0

## 2020-10-29 NOTE — PROGRESS NOTES
Chief Complaint   Patient presents with   • Annual Wellness Visit     This evaluation was conducted via Zoom using secure and encrypted videoconferencing technology. The patient was in a private location in the state of [place].    The patient's identity was confirmed and verbal consent was obtained for this virtual visit.    HPI:  Price is a 75 y.o. here for Medicare Annual Wellness Visit    Patient is doing well.  He is taking all medications as directed.  He tolerated medications well, no side effect reported.        Patient Active Problem List    Diagnosis Date Noted   • Elevated blood pressure reading without diagnosis of hypertension 10/29/2020   • Chronic left hip pain 04/25/2019   • Obesity (BMI 30-39.9) 01/20/2017   • Hypothyroidism due to acquired atrophy of thyroid 01/31/2016   • BPH (benign prostatic hyperplasia) 01/20/2016   • Pure hypercholesterolemia 01/20/2016       Current Outpatient Medications   Medication Sig Dispense Refill   • levothyroxine (SYNTHROID) 100 MCG Tab Take 1 Tab by mouth Every morning on an empty stomach. 90 Tab 1   • atorvastatin (LIPITOR) 20 MG Tab Take 1 Tab by mouth every bedtime. 90 Tab 3   • Saw Palmetto, Serenoa repens, (SAW PALMETTO PO) Take 600 mg by mouth every day.     • triamcinolone acetonide (KENALOG) 0.1 % Cream Apply  to affected area(s) 2 times a day.     • bimatoprost (LUMIGAN) 0.01 % Solution Place 1 Drop in both eyes every bedtime.       No current facility-administered medications for this visit.         Patient is taking medications as noted in medication list.  Current supplements as per medication list.     Allergies: Patient has no known allergies.    Current social contact/activities: Martial arts 2-3 times a week, six days a week swimming for 45 minutes per session, and stretching with meditation 2-3 times a week. House hold chores, yard work, and gardening. Reading, card games, and puzzles. Limited visits with family and friends during global pandemic  COVID-19.    Is patient current with immunizations? Yes.    He  reports that he quit smoking about 35 years ago. His smoking use included cigarettes. He has never used smokeless tobacco. He reports that he does not drink alcohol or use drugs.  Counseling given: Not Answered        DPA/Advanced directive: Patient has Advanced Directive on file.     ROS:    Gait: Uses no assistive device   Ostomy: No   Other tubes: No   Amputations: No   Chronic oxygen use No   Last eye exam: Year 2020  Wears hearing aids: No   : Denies any urinary leakage during the last 6 months      Screening:        Depression Screening    Little interest or pleasure in doing things?  0 - not at all  Feeling down, depressed, or hopeless? 0 - not at all  Patient Health Questionnaire Score: 0      Screening for Cognitive Impairment    Three Minute Recall (river, nation, finger)  3/3 River, Nation, Finger  Draw clock face with all 12 numbers and set the hands to show 10 past 11.  Yes 5/5    Fall Risk Assessment    Has the patient had two or more falls in the last year or any fall with injury in the last year?  Yes      Safety Assessment    Throw rugs on floor.  Yes  Handrails on all stairs.  Yes  Good lighting in all hallways.  Yes  Difficulty hearing.  Yes  Patient counseled about all safety risks that were identified.    Functional Assessment ADLs    Are there any barriers preventing you from cooking for yourself or meeting nutritional needs?  No.    Are there any barriers preventing you from driving safely or obtaining transportation?  No.    Are there any barriers preventing you from using a telephone or calling for help?  No.    Are there any barriers preventing you from shopping?  No.    Are there any barriers preventing you from taking care of your own finances?  No.    Are there any barriers preventing you from managing your medications?    No.    Are there any barriers preventing you from showering, bathing or dressing yourself?  No.     Are you currently engaging in any exercise or physical activity?  Yes.  Martial arts 2-3 times a week, six days a week swimming for 45 minutes per session, and stretching with meditation 2-3 times a week.  What is your perception of your health?  Excellent.    Health Maintenance Summary                IMM INFLUENZA Overdue 2020      Done 10/22/2019 Imm Admin: Influenza Vaccine Adult HD     Patient has more history with this topic...    Annual Wellness Visit Overdue 10/22/2020      Done 10/22/2019 SUBSEQUENT ANNUAL WELLNESS VISIT-INCLUDES PPPS ()     Patient has more history with this topic...    IMM DTaP/Tdap/Td Vaccine Postponed 2023 Originally 1964. Other     Done 2013 Imm Admin: TD Vaccine    COLONOSCOPY Next Due 2024      Done 2019 REFERRAL TO GI FOR COLONOSCOPY     Patient has more history with this topic...          Patient Care Team:  Shanda Lu M.D. as PCP - General (Family Medicine)  Rosalva Kelly M.D. as Consulting Physician (Ophthalmology)  Verónica Oviedo M.D. as Consulting Physician (Otolaryngology)  Cesar Gomes, PT as Physical Therapy (Physical Therapy)      Social History     Tobacco Use   • Smoking status: Former Smoker     Types: Cigarettes     Quit date: 1985     Years since quittin.7   • Smokeless tobacco: Never Used   Substance Use Topics   • Alcohol use: No     Alcohol/week: 0.0 oz     Frequency: Never     Binge frequency: Never   • Drug use: No     Family History   Problem Relation Age of Onset   • Hypertension Mother    • Other Father         ALS   • Hypertension Brother    • No Known Problems Son    • No Known Problems Son    • No Known Problems Son    • No Known Problems Son    • No Known Problems Maternal Grandmother    • No Known Problems Maternal Grandfather    • Heart Disease Paternal Grandmother    • Hypertension Paternal Grandmother    • No Known Problems Paternal Grandfather      He  has a past medical history of BPH (benign  "prostatic hyperplasia), Hyperlipidemia, and Thyroid disease.   Past Surgical History:   Procedure Laterality Date   • EYE SURGERY     • HERNIA REPAIR           Exam:     /93 (BP Location: Left arm, Patient Position: Sitting)   Pulse 61   Ht 1.765 m (5' 9.5\")   Wt 119.3 kg (263 lb)  Body mass index is 38.28 kg/m².    Hearing excellent.    Dentition good  Alert, oriented in no acute distress.  Eye contact is good, speech goal directed, affect calm      Assessment and Plan. The following treatment and monitoring plan is recommended:      1. Hypothyroidism due to acquired atrophy of thyroid  Chronic, controlled with levothyroxine 100 mcg daily, normal thyroid function tests in February 2020.  - levothyroxine (SYNTHROID) 100 MCG Tab; Take 1 Tab by mouth Every morning on an empty stomach.  Dispense: 90 Tab; Refill: 1  - TSH; Future  - FREE THYROXINE; Future    2. Pure hypercholesterolemia  Chronic, controlled with Lipitor 20 mg daily, no side effect reported, will continue.  - CBC WITH DIFFERENTIAL; Future  - Comp Metabolic Panel; Future  - Lipid Profile; Future    3. Obesity   - Patient identified as having weight management issue.  Appropriate orders and counseling given.     4. Chronic left hip pain  Controlled with regular exercise and activity modification.    5. Elevated blood pressure reading without diagnosis of hypertension  Patient recently bought a new blood pressure device.  He stated his home blood pressure has been elevated.  Pressure is reported at 143/93 today.  Patient is asymptomatic.  -Patient was advised to continue to monitor his blood pressure regularly couple times a week and keep logs.  He was also advised to check blood pressure at local pharmacies. If his BP remains elevated, he should schedule appointment with the clinic for manual BP check.     6. Benign prostatic hyperplasia with lower urinary tract symptoms, symptom details unspecified  Chronic, controlled with Saw Palmtto, no s/e " reported, will continue.      7. Encounter for blood typing  - ABO AND RH DETERMINATION; Future    8. Encounter for prostate cancer screening  - PSA TOTAL + %FREE; Future    9. Medicare annual wellness visit, subsequent  General health and wellness counseling provided. Topics might include: diet, exercise, vitamin supplement, mental health, sleep, stress, preventive cares and vaccine recommendations.         Services suggested: No services needed at this time  Health Care Screening recommendations as per orders if indicated.  Referrals offered: PT/OT/Nutrition counseling/Behavioral Health/Smoking cessation as per orders if indicated.    Discussion today about general wellness and lifestyle habits:    · Prevent falls and reduce trip hazards; Cautioned about securing or removing rugs.  · Have a working fire alarm and carbon monoxide detector;   · Engage in regular physical activity and social activities       Follow-up: Return in about 6 months (around 4/29/2021) for Multiple issues.

## 2021-01-11 DIAGNOSIS — Z23 NEED FOR VACCINATION: ICD-10-CM

## 2021-01-21 ENCOUNTER — IMMUNIZATION (OUTPATIENT)
Dept: FAMILY PLANNING/WOMEN'S HEALTH CLINIC | Facility: IMMUNIZATION CENTER | Age: 76
End: 2021-01-21
Attending: INTERNAL MEDICINE
Payer: COMMERCIAL

## 2021-01-21 DIAGNOSIS — Z23 ENCOUNTER FOR VACCINATION: Primary | ICD-10-CM

## 2021-01-21 DIAGNOSIS — Z23 NEED FOR VACCINATION: ICD-10-CM

## 2021-01-21 PROCEDURE — 91300 PFIZER SARS-COV-2 VACCINE: CPT

## 2021-01-21 PROCEDURE — 0001A PFIZER SARS-COV-2 VACCINE: CPT

## 2021-02-11 ENCOUNTER — IMMUNIZATION (OUTPATIENT)
Dept: FAMILY PLANNING/WOMEN'S HEALTH CLINIC | Facility: IMMUNIZATION CENTER | Age: 76
End: 2021-02-11
Attending: INTERNAL MEDICINE
Payer: COMMERCIAL

## 2021-02-11 DIAGNOSIS — Z23 ENCOUNTER FOR VACCINATION: Primary | ICD-10-CM

## 2021-02-11 PROCEDURE — 91300 PFIZER SARS-COV-2 VACCINE: CPT | Performed by: INTERNAL MEDICINE

## 2021-02-11 PROCEDURE — 0002A PFIZER SARS-COV-2 VACCINE: CPT | Performed by: INTERNAL MEDICINE

## 2021-03-15 ENCOUNTER — APPOINTMENT (OUTPATIENT)
Dept: MEDICAL GROUP | Age: 76
End: 2021-03-15
Payer: COMMERCIAL

## 2021-03-26 ENCOUNTER — HOSPITAL ENCOUNTER (OUTPATIENT)
Dept: RADIOLOGY | Facility: MEDICAL CENTER | Age: 76
End: 2021-03-26
Attending: FAMILY MEDICINE
Payer: COMMERCIAL

## 2021-03-26 ENCOUNTER — OFFICE VISIT (OUTPATIENT)
Dept: MEDICAL GROUP | Age: 76
End: 2021-03-26
Payer: COMMERCIAL

## 2021-03-26 VITALS
WEIGHT: 271 LBS | BODY MASS INDEX: 38.8 KG/M2 | HEART RATE: 70 BPM | OXYGEN SATURATION: 95 % | HEIGHT: 70 IN | SYSTOLIC BLOOD PRESSURE: 122 MMHG | TEMPERATURE: 97.8 F | DIASTOLIC BLOOD PRESSURE: 70 MMHG

## 2021-03-26 DIAGNOSIS — G89.29 CHRONIC PAIN OF RIGHT KNEE: Primary | ICD-10-CM

## 2021-03-26 DIAGNOSIS — G89.29 CHRONIC PAIN OF RIGHT KNEE: ICD-10-CM

## 2021-03-26 DIAGNOSIS — M25.561 CHRONIC PAIN OF RIGHT KNEE: Primary | ICD-10-CM

## 2021-03-26 DIAGNOSIS — M25.561 CHRONIC PAIN OF RIGHT KNEE: ICD-10-CM

## 2021-03-26 DIAGNOSIS — Z01.30 BLOOD PRESSURE CHECK: ICD-10-CM

## 2021-03-26 PROBLEM — R03.0 ELEVATED BLOOD PRESSURE READING WITHOUT DIAGNOSIS OF HYPERTENSION: Status: RESOLVED | Noted: 2020-10-29 | Resolved: 2021-03-26

## 2021-03-26 PROCEDURE — 99213 OFFICE O/P EST LOW 20 MIN: CPT | Performed by: FAMILY MEDICINE

## 2021-03-26 PROCEDURE — 73562 X-RAY EXAM OF KNEE 3: CPT | Mod: RT

## 2021-03-26 ASSESSMENT — FIBROSIS 4 INDEX: FIB4 SCORE: 2.352941176470588235

## 2021-03-26 ASSESSMENT — PATIENT HEALTH QUESTIONNAIRE - PHQ9: CLINICAL INTERPRETATION OF PHQ2 SCORE: 0

## 2021-03-26 NOTE — PROGRESS NOTES
Subjective:   CC: blood pressure concerns     HPI:     Price Moon is a 75 y.o. male, established patient of the clinic, presents with the following concerns:     Patient is overall healthy, no major medical or surgical history.  He has hyperlipidemia and hypothyroidism.  Both conditions are under good control with medication.  Patient presents today for concerns of elevated blood pressure at home.  Per blood pressure log, sBP 130-160s, dBP 70-90s. He is asymptomatic. He has no known HYPERTENSION. He exercises 2 times per day. Neg hx of drugs, alcohol, tobacco abuse.  His BMI is 39.45, however, patient has high muscle mass.    Patient also complains of chronic worsening right knee pain.  Pain is mostly felt at anterior knee.  He also complains of pressure sensation with bending down or knee flexion.  Pain is worse with certain activity.  He also complains of mild effusion. His symptoms respond well to Tylenol. Negative fever, chills, joint erythema/warmth.  He had hyperextension trauma to the right knee about 20 years ago leading to tears of the calf muscle. He had surgical surgical repair of the calf muscle.  No other injury to the right knee or leg reported.  Patient is active and exercises 2 times per day.    Current medicines (including changes today)  Current Outpatient Medications   Medication Sig Dispense Refill   • levothyroxine (SYNTHROID) 100 MCG Tab Take 1 Tab by mouth Every morning on an empty stomach. 90 Tab 1   • atorvastatin (LIPITOR) 20 MG Tab Take 1 Tab by mouth every bedtime. 90 Tab 3   • Saw Palmetto, Serenoa repens, (SAW PALMETTO PO) Take 600 mg by mouth every day.     • triamcinolone acetonide (KENALOG) 0.1 % Cream Apply  to affected area(s) 2 times a day.     • bimatoprost (LUMIGAN) 0.01 % Solution Place 1 Drop in both eyes every bedtime.       No current facility-administered medications for this visit.     He  has a past medical history of BPH (benign prostatic hyperplasia),  "Hyperlipidemia, and Thyroid disease.    I personally reviewed patient's problem list, allergies, medications, family hx, social hx with patient and update EPIC.     REVIEW OF SYSTEMS:  CONSTITUTIONAL:  Denies night sweats, fatigue, malaise, lethargy, fever or chills.  RESPIRATORY:  Denies cough, wheeze, hemoptysis, or shortness of breath.  CARDIOVASCULAR:  Denies chest pains, palpitations, pedal edema     Objective:     /70 (BP Location: Right arm, Patient Position: Sitting, BP Cuff Size: Adult long)   Pulse 70   Temp 36.6 °C (97.8 °F) (Temporal)   Ht 1.765 m (5' 9.5\")   Wt 123 kg (271 lb)   SpO2 95%  Body mass index is 39.45 kg/m².    Physical Exam:  Constitutional: awake, alert, in no distress.  Skin: Warm, dry, good turgor, no rashes, bruises, ulcers in visible areas.  Eye: conjunctiva clear, lids neg for edema or lesions.  Respiratory: Unlabored respiratory effort, lungs clear to auscultation, no wheezes, no rales.  Cardiovascular: Normal S1, S2, no murmur, no pedal edema.  Psych: Oriented x3, affect and mood wnl, intact judgement and insight.   MSK:   R knee exam:   No visible deformity. Mild joint effusion, neg erythema/warmth.   Pain to palpation along the joint lines: neg  Lachman's test: neg  Anterior draw test: neg  Posterior draw test: neg  Valgus stress: neg  Varus stress: neg  Renetta: neg      Assessment and Plan:   The following treatment plan was discussed    1. Chronic pain of right knee  Likely osteoarthritis  - DX-KNEE 3 VIEWS LEFT; Future  - REFERRAL TO PHYSICAL THERAPY  - cont Tylenol PRN for pain   - weight loss, activity modification    2. Blood pressure check  Pt has no know hypertension who presents today for BP check due to elevated BP at home. He is asymptomatic. His BP in the clinic was 122/70. I reviewed blood pressure cuff and pt's home device. It appears that the cuff is too small for his arm which could lead to erroneous BP readings. Pt will try to obtain a different " device to monitor home BP. He will f/u with me soon for AWV, we will recheck his BP again then.       Shanda Lu M.D.      Followup: Return for Annual wellness visit.    Please note that this dictation was created using voice recognition software. I have made every reasonable attempt to correct obvious errors, but I expect that there are errors of grammar and possibly content that I did not discover before finalizing the note.

## 2021-04-09 ENCOUNTER — APPOINTMENT (RX ONLY)
Dept: URBAN - METROPOLITAN AREA CLINIC 4 | Facility: CLINIC | Age: 76
Setting detail: DERMATOLOGY
End: 2021-04-09

## 2021-04-09 DIAGNOSIS — L57.0 ACTINIC KERATOSIS: ICD-10-CM

## 2021-04-09 PROCEDURE — ? LIQUID NITROGEN

## 2021-04-09 PROCEDURE — 17004 DESTROY PREMAL LESIONS 15/>: CPT

## 2021-04-09 PROCEDURE — ? COUNSELING

## 2021-04-09 ASSESSMENT — LOCATION DETAILED DESCRIPTION DERM
LOCATION DETAILED: LEFT MEDIAL ZYGOMA
LOCATION DETAILED: RIGHT LATERAL EYEBROW
LOCATION DETAILED: LEFT TRAGUS
LOCATION DETAILED: LEFT SUPERIOR MEDIAL MALAR CHEEK
LOCATION DETAILED: RIGHT INFERIOR LATERAL FOREHEAD
LOCATION DETAILED: RIGHT FOREHEAD
LOCATION DETAILED: LEFT CRUS OF HELIX
LOCATION DETAILED: RIGHT SUPERIOR LATERAL MALAR CHEEK
LOCATION DETAILED: NASAL DORSUM
LOCATION DETAILED: RIGHT SUPERIOR FOREHEAD
LOCATION DETAILED: RIGHT SUPERIOR CENTRAL MALAR CHEEK
LOCATION DETAILED: RIGHT SCAPHA
LOCATION DETAILED: LEFT SUPERIOR FOREHEAD
LOCATION DETAILED: LEFT MEDIAL MALAR CHEEK
LOCATION DETAILED: LEFT MEDIAL TEMPLE
LOCATION DETAILED: RIGHT SUPERIOR TEMPLE

## 2021-04-09 ASSESSMENT — LOCATION SIMPLE DESCRIPTION DERM
LOCATION SIMPLE: RIGHT CHEEK
LOCATION SIMPLE: LEFT FOREHEAD
LOCATION SIMPLE: LEFT CHEEK
LOCATION SIMPLE: LEFT ZYGOMA
LOCATION SIMPLE: LEFT EAR
LOCATION SIMPLE: LEFT TEMPLE
LOCATION SIMPLE: RIGHT FOREHEAD
LOCATION SIMPLE: RIGHT EYEBROW
LOCATION SIMPLE: RIGHT TEMPLE
LOCATION SIMPLE: NOSE
LOCATION SIMPLE: RIGHT EAR

## 2021-04-09 ASSESSMENT — LOCATION ZONE DERM
LOCATION ZONE: EAR
LOCATION ZONE: FACE
LOCATION ZONE: NOSE

## 2021-04-12 ENCOUNTER — APPOINTMENT (OUTPATIENT)
Dept: LAB | Facility: MEDICAL CENTER | Age: 76
End: 2021-04-12
Attending: FAMILY MEDICINE
Payer: COMMERCIAL

## 2021-04-13 LAB
ABO GROUP BLD: NORMAL
ALBUMIN SERPL-MCNC: 4.3 G/DL (ref 3.7–4.7)
ALBUMIN/GLOB SERPL: 1.3 {RATIO} (ref 1.2–2.2)
ALP SERPL-CCNC: 108 IU/L (ref 39–117)
ALT SERPL-CCNC: 29 IU/L (ref 0–44)
AST SERPL-CCNC: 27 IU/L (ref 0–40)
BASOPHILS # BLD AUTO: 0 X10E3/UL (ref 0–0.2)
BASOPHILS NFR BLD AUTO: 1 %
BILIRUB SERPL-MCNC: 0.9 MG/DL (ref 0–1.2)
BUN SERPL-MCNC: 18 MG/DL (ref 8–27)
BUN/CREAT SERPL: 16 (ref 10–24)
CALCIUM SERPL-MCNC: 9.6 MG/DL (ref 8.6–10.2)
CHLORIDE SERPL-SCNC: 101 MMOL/L (ref 96–106)
CHOLEST SERPL-MCNC: 137 MG/DL (ref 100–199)
CO2 SERPL-SCNC: 25 MMOL/L (ref 20–29)
CREAT SERPL-MCNC: 1.12 MG/DL (ref 0.76–1.27)
EOSINOPHIL # BLD AUTO: 0.1 X10E3/UL (ref 0–0.4)
EOSINOPHIL NFR BLD AUTO: 1 %
ERYTHROCYTE [DISTWIDTH] IN BLOOD BY AUTOMATED COUNT: 13.1 % (ref 11.6–15.4)
GLOBULIN SER CALC-MCNC: 3.2 G/DL (ref 1.5–4.5)
GLUCOSE SERPL-MCNC: 92 MG/DL (ref 65–99)
HCT VFR BLD AUTO: 50.2 % (ref 37.5–51)
HDLC SERPL-MCNC: 41 MG/DL
HGB BLD-MCNC: 16.4 G/DL (ref 13–17.7)
IMM GRANULOCYTES # BLD AUTO: 0 X10E3/UL (ref 0–0.1)
IMM GRANULOCYTES NFR BLD AUTO: 0 %
IMMATURE CELLS  115398: NORMAL
LABORATORY COMMENT REPORT: NORMAL
LDLC SERPL CALC-MCNC: 70 MG/DL (ref 0–99)
LYMPHOCYTES # BLD AUTO: 1.6 X10E3/UL (ref 0.7–3.1)
LYMPHOCYTES NFR BLD AUTO: 28 %
MCH RBC QN AUTO: 30.4 PG (ref 26.6–33)
MCHC RBC AUTO-ENTMCNC: 32.7 G/DL (ref 31.5–35.7)
MCV RBC AUTO: 93 FL (ref 79–97)
MONOCYTES # BLD AUTO: 0.5 X10E3/UL (ref 0.1–0.9)
MONOCYTES NFR BLD AUTO: 9 %
MORPHOLOGY BLD-IMP: NORMAL
NEUTROPHILS # BLD AUTO: 3.4 X10E3/UL (ref 1.4–7)
NEUTROPHILS NFR BLD AUTO: 61 %
NRBC BLD AUTO-RTO: NORMAL %
PLATELET # BLD AUTO: 191 X10E3/UL (ref 150–450)
POTASSIUM SERPL-SCNC: 5 MMOL/L (ref 3.5–5.2)
PROT SERPL-MCNC: 7.5 G/DL (ref 6–8.5)
PSA FREE MFR SERPL: 43.1 %
PSA FREE SERPL-MCNC: 0.56 NG/ML
PSA SERPL-MCNC: 1.3 NG/ML (ref 0–4)
RBC # BLD AUTO: 5.4 X10E6/UL (ref 4.14–5.8)
RH BLD: POSITIVE
SODIUM SERPL-SCNC: 139 MMOL/L (ref 134–144)
T4 FREE SERPL-MCNC: 1.28 NG/DL (ref 0.82–1.77)
TRIGL SERPL-MCNC: 149 MG/DL (ref 0–149)
TSH SERPL DL<=0.005 MIU/L-ACNC: 2.69 UIU/ML (ref 0.45–4.5)
VLDLC SERPL CALC-MCNC: 26 MG/DL (ref 5–40)
WBC # BLD AUTO: 5.5 X10E3/UL (ref 3.4–10.8)

## 2021-04-19 SDOH — ECONOMIC STABILITY: HOUSING INSECURITY: IN THE LAST 12 MONTHS, HOW MANY PLACES HAVE YOU LIVED?: 1

## 2021-04-19 SDOH — ECONOMIC STABILITY: HOUSING INSECURITY
IN THE LAST 12 MONTHS, WAS THERE A TIME WHEN YOU DID NOT HAVE A STEADY PLACE TO SLEEP OR SLEPT IN A SHELTER (INCLUDING NOW)?: YES

## 2021-04-19 SDOH — ECONOMIC STABILITY: TRANSPORTATION INSECURITY
IN THE PAST 12 MONTHS, HAS THE LACK OF TRANSPORTATION KEPT YOU FROM MEDICAL APPOINTMENTS OR FROM GETTING MEDICATIONS?: NO

## 2021-04-19 SDOH — ECONOMIC STABILITY: HOUSING INSECURITY
IN THE LAST 12 MONTHS, WAS THERE A TIME WHEN YOU DID NOT HAVE A STEADY PLACE TO SLEEP OR SLEPT IN A SHELTER (INCLUDING NOW)?: NO

## 2021-04-19 SDOH — ECONOMIC STABILITY: INCOME INSECURITY: IN THE LAST 12 MONTHS, WAS THERE A TIME WHEN YOU WERE NOT ABLE TO PAY THE MORTGAGE OR RENT ON TIME?: YES

## 2021-04-19 SDOH — ECONOMIC STABILITY: TRANSPORTATION INSECURITY
IN THE PAST 12 MONTHS, HAS LACK OF RELIABLE TRANSPORTATION KEPT YOU FROM MEDICAL APPOINTMENTS, MEETINGS, WORK OR FROM GETTING THINGS NEEDED FOR DAILY LIVING?: NO

## 2021-04-19 SDOH — HEALTH STABILITY: PHYSICAL HEALTH: ON AVERAGE, HOW MANY MINUTES DO YOU ENGAGE IN EXERCISE AT THIS LEVEL?: 60 MINUTES

## 2021-04-19 SDOH — HEALTH STABILITY: PHYSICAL HEALTH: ON AVERAGE, HOW MANY DAYS PER WEEK DO YOU ENGAGE IN MODERATE TO STRENUOUS EXERCISE (LIKE A BRISK WALK)?: 7 DAYS

## 2021-04-19 SDOH — HEALTH STABILITY: MENTAL HEALTH
STRESS IS WHEN SOMEONE FEELS TENSE, NERVOUS, ANXIOUS, OR CAN'T SLEEP AT NIGHT BECAUSE THEIR MIND IS TROUBLED. HOW STRESSED ARE YOU?: NOT AT ALL

## 2021-04-19 ASSESSMENT — SOCIAL DETERMINANTS OF HEALTH (SDOH)
HOW OFTEN DO YOU ATTENT MEETINGS OF THE CLUB OR ORGANIZATION YOU BELONG TO?: NEVER
WITHIN THE PAST 12 MONTHS, YOU WORRIED THAT YOUR FOOD WOULD RUN OUT BEFORE YOU GOT THE MONEY TO BUY MORE: NEVER TRUE
HOW OFTEN DO YOU GET TOGETHER WITH FRIENDS OR RELATIVES?: ONCE A WEEK
HOW HARD IS IT FOR YOU TO PAY FOR THE VERY BASICS LIKE FOOD, HOUSING, MEDICAL CARE, AND HEATING?: NOT HARD AT ALL
IN A TYPICAL WEEK, HOW MANY TIMES DO YOU TALK ON THE PHONE WITH FAMILY, FRIENDS, OR NEIGHBORS?: THREE TIMES A WEEK
HOW OFTEN DO YOU HAVE SIX OR MORE DRINKS ON ONE OCCASION: NEVER
HOW OFTEN DO YOU ATTEND CHURCH OR RELIGIOUS SERVICES?: NEVER
DO YOU BELONG TO ANY CLUBS OR ORGANIZATIONS SUCH AS CHURCH GROUPS UNIONS, FRATERNAL OR ATHLETIC GROUPS, OR SCHOOL GROUPS?: NO
WITHIN THE PAST 12 MONTHS, THE FOOD YOU BOUGHT JUST DIDN'T LAST AND YOU DIDN'T HAVE MONEY TO GET MORE: NEVER TRUE
HOW OFTEN DO YOU HAVE A DRINK CONTAINING ALCOHOL: NEVER

## 2021-04-26 ENCOUNTER — TELEMEDICINE (OUTPATIENT)
Dept: MEDICAL GROUP | Age: 76
End: 2021-04-26
Payer: COMMERCIAL

## 2021-04-26 VITALS — HEIGHT: 70 IN | WEIGHT: 271 LBS | BODY MASS INDEX: 38.8 KG/M2

## 2021-04-26 DIAGNOSIS — E03.4 HYPOTHYROIDISM DUE TO ACQUIRED ATROPHY OF THYROID: ICD-10-CM

## 2021-04-26 DIAGNOSIS — N40.1 BENIGN PROSTATIC HYPERPLASIA WITH LOWER URINARY TRACT SYMPTOMS, SYMPTOM DETAILS UNSPECIFIED: ICD-10-CM

## 2021-04-26 DIAGNOSIS — E78.00 PURE HYPERCHOLESTEROLEMIA: Primary | ICD-10-CM

## 2021-04-26 DIAGNOSIS — E66.9 OBESITY (BMI 30-39.9): ICD-10-CM

## 2021-04-26 PROCEDURE — 99214 OFFICE O/P EST MOD 30 MIN: CPT | Mod: 95,CR | Performed by: FAMILY MEDICINE

## 2021-04-26 RX ORDER — LEVOTHYROXINE SODIUM 0.1 MG/1
100 TABLET ORAL
Qty: 90 TABLET | Refills: 3 | Status: SHIPPED | OUTPATIENT
Start: 2021-04-26 | End: 2022-05-31

## 2021-04-26 RX ORDER — ATORVASTATIN CALCIUM 20 MG/1
20 TABLET, FILM COATED ORAL
Qty: 90 TABLET | Refills: 3 | Status: SHIPPED | OUTPATIENT
Start: 2021-04-26 | End: 2022-07-29

## 2021-04-26 ASSESSMENT — FIBROSIS 4 INDEX: FIB4 SCORE: 1.97

## 2021-04-26 NOTE — PROGRESS NOTES
Virtual Visit: Established Patient   This visit was conducted via Zoom using secure and encrypted videoconferencing technology. The patient was in a private location in the state of [place].    The patient's identity was confirmed and verbal consent was obtained for this virtual visit.    Subjective:   CC: hyperlipidemia f/u     Price Moon is a 75 y.o. male with chronic hyperlipidemia, hypothyroidism, BPH, and obesity.  Patient is taking all medications as directed.  He tolerates them well, no side effect reported.  Patient is active and try to exercise regularly.  He gains a few pounds over the past few months despite efforts to control his diet. Neg hx of drugs, alcohol, tobacco abuse.     Patient is feeling well, he does not have any acute concerns today.       ROS   Denies any recent fevers or chills. No nausea or vomiting. No chest pains or shortness of breath.     No Known Allergies    Current medicines (including changes today)  Current Outpatient Medications   Medication Sig Dispense Refill   • levothyroxine (SYNTHROID) 100 MCG Tab Take 1 tablet by mouth every morning on an empty stomach. 90 tablet 3   • atorvastatin (LIPITOR) 20 MG Tab Take 1 tablet by mouth at bedtime. 90 tablet 3   • Saw Palmetto, Serenoa repens, (SAW PALMETTO PO) Take 600 mg by mouth every day.     • triamcinolone acetonide (KENALOG) 0.1 % Cream Apply  to affected area(s) 2 times a day.     • bimatoprost (LUMIGAN) 0.01 % Solution Place 1 Drop in both eyes every bedtime.       No current facility-administered medications for this visit.       Patient Active Problem List    Diagnosis Date Noted   • Chronic pain of right knee 03/26/2021   • Chronic left hip pain 04/25/2019   • Obesity (BMI 30-39.9) 01/20/2017   • Hypothyroidism due to acquired atrophy of thyroid 01/31/2016   • BPH (benign prostatic hyperplasia) 01/20/2016   • Pure hypercholesterolemia 01/20/2016       Family History   Problem Relation Age of Onset   • Hypertension  "Mother    • Other Father         ALS   • Hypertension Brother    • No Known Problems Son    • No Known Problems Son    • No Known Problems Son    • No Known Problems Son    • No Known Problems Maternal Grandmother    • No Known Problems Maternal Grandfather    • Heart Disease Paternal Grandmother    • Hypertension Paternal Grandmother    • No Known Problems Paternal Grandfather        He  has a past medical history of BPH (benign prostatic hyperplasia), Hyperlipidemia, and Thyroid disease.  He  has a past surgical history that includes hernia repair and eye surgery.       Objective:   Ht 1.765 m (5' 9.5\")   Wt 123 kg (271 lb)   BMI 39.45 kg/m²     Physical Exam:  Constitutional: Alert, no distress, well-groomed.  Skin: No rashes in visible areas.  Eye: Round. Conjunctiva clear, lids normal. No icterus.   ENMT: Lips pink without lesions, good dentition, moist mucous membranes. Phonation normal.  Neck: No masses, no thyromegaly. Moves freely without pain.  Respiratory: Unlabored respiratory effort, no cough or audible wheeze  Psych: Alert and oriented x3, normal affect and mood.       Assessment and Plan:   The following treatment plan was discussed:     1. Pure hypercholesterolemia  Chronic, controlled with Lipitor 20 mg qd, no s/e reported, will continue.    - atorvastatin (LIPITOR) 20 MG Tab; Take 1 tablet by mouth at bedtime.  Dispense: 90 tablet; Refill: 3  - CBC WITH DIFFERENTIAL; Future  - Comp Metabolic Panel; Future  - Lipid Profile; Future  - dietary modification, exercise, and weight loss.   - pt is interested in discontinuation of Lipitor. I discussed risks/benefits of this medication and guidelines. He decided to continue to take Lipitor at this time.     2. Hypothyroidism due to acquired atrophy of thyroid  Chronic, controlled with Levothyroxine 100 mcg qd, normal TFT in 4/2021, no s/e reported, will continue.    - levothyroxine (SYNTHROID) 100 MCG Tab; Take 1 tablet by mouth every morning on an empty " stomach.  Dispense: 90 tablet; Refill: 3  - TSH; Future  - FREE THYROXINE; Future    3. Benign prostatic hyperplasia with lower urinary tract symptoms, symptom details unspecified  Controlled with Saw Palmetto  He wakes up once every night for urination (3-4 times prior to initiation of Saw Streeter). Denies daytime urinary symptoms.   - cont Saw Streeter.     4. Obesity (BMI 30-39.9)  - Patient identified as having weight management issue.  Appropriate orders and counseling given.          Follow-up: Return in about 1 year (around 4/26/2022) for Annual wellness visit.

## 2021-04-26 NOTE — PROGRESS NOTES
No chief complaint on file.    This evaluation was conducted via {platform:52257} using secure and encrypted videoconferencing technology. {Virtual or Telemedicine:00553}    HPI:  Price is a 75 y.o. here for Medicare Annual Wellness Visit    ***    Patient Active Problem List    Diagnosis Date Noted   • Chronic pain of right knee 03/26/2021   • Chronic left hip pain 04/25/2019   • Obesity (BMI 30-39.9) 01/20/2017   • Hypothyroidism due to acquired atrophy of thyroid 01/31/2016   • BPH (benign prostatic hyperplasia) 01/20/2016   • Pure hypercholesterolemia 01/20/2016       Current Outpatient Medications   Medication Sig Dispense Refill   • levothyroxine (SYNTHROID) 100 MCG Tab Take 1 Tab by mouth Every morning on an empty stomach. 90 Tab 1   • atorvastatin (LIPITOR) 20 MG Tab Take 1 Tab by mouth every bedtime. 90 Tab 3   • Saw Palmetto, Serenoa repens, (SAW PALMETTO PO) Take 600 mg by mouth every day.     • triamcinolone acetonide (KENALOG) 0.1 % Cream Apply  to affected area(s) 2 times a day.     • bimatoprost (LUMIGAN) 0.01 % Solution Place 1 Drop in both eyes every bedtime.       No current facility-administered medications for this visit.        {MEDICATION ADHERENCE:20401}  Current supplements as per medication list.     Allergies: Patient has no known allergies.    Current social contact/activities: *** ***    Is patient current with immunizations? {YES/NO DUE FOR IZ:20402}    He  reports that he quit smoking about 36 years ago. His smoking use included cigarettes. He has never used smokeless tobacco. He reports that he does not drink alcohol and does not use drugs.  Counseling given: Not Answered      DPA/Advanced directive: {MA ADVANCED DIRECTIVE:11607}    ROS:    Gait: Uses {DEVICE:35656} ***  Ostomy: {YES / NO:48490} ***  Other tubes: {YES / NO:23912} ***  Amputations: {YES / NO:23912} ***  Chronic oxygen use {YES / NO:47240} ***  Last eye exam *** ***  Wears hearing aids: {YES / NO:03414} ***  : {Urinary  leakage?:95390}  ***    Screening:  ***  Depression Screening  Little interest or pleasure in doing things?     Feeling down, depressed, or hopeless?    Trouble falling or staying asleep, or sleeping too much?     Feeling tired or having little energy?     Poor appetite or overeating?     Feeling bad about yourself - or that you are a failure or have let yourself or your family down?    Trouble concentrating on things, such as reading the newspaper or watching television?    Moving or speaking so slowly that other people could have noticed.  Or the opposite - being so fidgety or restless that you have been moving around a lot more than usual?     Thoughts that you would be better off dead, or of hurting yourself?     Patient Health Questionnaire Score:      If depressive symptoms identified deferred to follow up visit unless specifically addressed in assessment and plan.    Interpretation of PHQ-9 Total Score   Score Severity   1-4 No Depression   5-9 Mild Depression   10-14 Moderate Depression   15-19 Moderately Severe Depression   20-27 Severe Depression      Screening for Cognitive Impairment  Three Minute Recall (captain, yash, picture)   /3    Draw clock face with all 12 numbers and set the hands to show 5 past 8.       If cognitive concerns identified, deferred for follow up unless specifically addressed in assessment and plan.    Fall Risk Assessment  Has the patient had two or more falls in the last year or any fall with injury in the last year?     If fall risk identified, deferred for follow up unless specifically addressed in assessment and plan.    Safety Assessment  Throw rugs on floor.     Handrails on all stairs.     Good lighting in all hallways.     Difficulty hearing.     Patient counseled about all safety risks that were identified.    Functional Assessment ADLs  Are there any barriers preventing you from cooking for yourself or meeting nutritional needs?   .    Are there any barriers preventing  you from driving safely or obtaining transportation?   .    Are there any barriers preventing you from using a telephone or calling for help?   .    Are there any barriers preventing you from shopping?   .    Are there any barriers preventing you from taking care of your own finances?   .    Are there any barriers preventing you from managing your medications?     .    Are there any barriers preventing you from showering, bathing or dressing yourself?   .    Are you currently engaging in any exercise or physical activity?   .     What is your perception of your health?   .    Health Maintenance Summary                IMM DTaP/Tdap/Td Vaccine Postponed 2023 Originally 1964. Other     Done 2013 Imm Admin: TD Vaccine    Annual Wellness Visit Next Due 10/30/2021      Done 10/29/2020 LOS: [facility] ANNUAL WELLNESS VISIT-INCLUDES PPPS SUBSEQUE*     Patient has more history with this topic...    COLONOSCOPY Next Due 2024      Done 2019 REFERRAL TO GI FOR COLONOSCOPY     Patient has more history with this topic...          Patient Care Team:  Shanda Lu M.D. as PCP - General (Family Medicine)  Rosalva Kelly M.D. as Consulting Physician (Ophthalmology)  Verónica Oviedo M.D. as Consulting Physician (Otolaryngology)  Cesar Gomes, PT as Physical Therapist (Physical Therapy)    Social History     Tobacco Use   • Smoking status: Former Smoker     Types: Cigarettes     Quit date: 1985     Years since quittin.2   • Smokeless tobacco: Never Used   Substance Use Topics   • Alcohol use: No     Alcohol/week: 0.0 oz   • Drug use: No     Family History   Problem Relation Age of Onset   • Hypertension Mother    • Other Father         ALS   • Hypertension Brother    • No Known Problems Son    • No Known Problems Son    • No Known Problems Son    • No Known Problems Son    • No Known Problems Maternal Grandmother    • No Known Problems Maternal Grandfather    • Heart Disease Paternal Grandmother     • Hypertension Paternal Grandmother    • No Known Problems Paternal Grandfather      He  has a past medical history of BPH (benign prostatic hyperplasia), Hyperlipidemia, and Thyroid disease.   Past Surgical History:   Procedure Laterality Date   • EYE SURGERY     • HERNIA REPAIR           Exam:   There were no vitals taken for this visit. There is no height or weight on file to calculate BMI.    Hearing {GOOD/FAIR/POOR/EXCELLENT:44255}.    Dentition {DENTITION:35471}  Alert, oriented in no acute distress.  Eye contact is good, speech goal directed, affect calm  ***    Assessment and Plan. The following treatment and monitoring plan is recommended:  ***  1. Pure hypercholesterolemia    2. Hypothyroidism due to acquired atrophy of thyroid    3. Benign prostatic hyperplasia with lower urinary tract symptoms, symptom details unspecified    4. Chronic left hip pain    5. Chronic pain of right knee    6. Obesity (BMI 30-39.9)       Services suggested: { AWV COORDINATION OF SERVICES:20405}  Health Care Screening recommendations as per orders if indicated.  Referrals offered: PT/OT/Nutrition counseling/Behavioral Health/Smoking cessation as per orders if indicated.    Discussion today about general wellness and lifestyle habits:    · Prevent falls and reduce trip hazards; Cautioned about securing or removing rugs.  · Have a working fire alarm and carbon monoxide detector;   · Engage in regular physical activity and social activities     Follow-up: No follow-ups on file.

## 2021-06-16 ENCOUNTER — NURSE TRIAGE (OUTPATIENT)
Dept: HEALTH INFORMATION MANAGEMENT | Facility: OTHER | Age: 76
End: 2021-06-16

## 2021-06-16 NOTE — TELEPHONE ENCOUNTER
1. Caller Name: Jon                 Call Back Number: [phone] (home)   2.   [facility] PCP or Specialty Provider: Yes Vu        2. Has the patient previously tested positive for COVID-19? No    3.  In the last two weeks, has the patient had any new or worsening symptoms (not explained by alternative diagnosis)? No.    4.  Does patient have any comoribidities? None     5.  Has the patient had any known contact with someone who is suspected or confirmed to have COVID-19? No.    5. Disposition: Cleared by RN Triage as potential is low for COVID-19; OK to keep/schedule appointment    Note routed to [facility] Provider: LAURIE only.     Headaches, cough, and GI problems for multiple months and pt has not gotten any better.

## 2021-06-16 NOTE — TELEPHONE ENCOUNTER
----- Message from Dee Holland sent at 6/16/2021  4:46 PM PDT -----  COUGH/ STOMACH PAIN/ HEADACHES/ PER WIFE DECLINES VV

## 2021-06-17 ENCOUNTER — OFFICE VISIT (OUTPATIENT)
Dept: MEDICAL GROUP | Age: 76
End: 2021-06-17
Payer: COMMERCIAL

## 2021-06-17 VITALS
SYSTOLIC BLOOD PRESSURE: 120 MMHG | HEIGHT: 70 IN | OXYGEN SATURATION: 92 % | TEMPERATURE: 98.7 F | BODY MASS INDEX: 37.65 KG/M2 | DIASTOLIC BLOOD PRESSURE: 72 MMHG | HEART RATE: 83 BPM | WEIGHT: 263 LBS

## 2021-06-17 DIAGNOSIS — R05.3 PERSISTENT COUGH: Primary | ICD-10-CM

## 2021-06-17 DIAGNOSIS — J30.9 ALLERGIC RHINITIS, UNSPECIFIED SEASONALITY, UNSPECIFIED TRIGGER: ICD-10-CM

## 2021-06-17 PROCEDURE — 99213 OFFICE O/P EST LOW 20 MIN: CPT | Performed by: FAMILY MEDICINE

## 2021-06-17 RX ORDER — FEXOFENADINE HCL 180 MG/1
180 TABLET ORAL DAILY
Qty: 90 TABLET | Refills: 0 | Status: SHIPPED | OUTPATIENT
Start: 2021-06-17 | End: 2021-10-25 | Stop reason: SDUPTHER

## 2021-06-17 RX ORDER — FLUTICASONE PROPIONATE 50 MCG
2 SPRAY, SUSPENSION (ML) NASAL DAILY
Qty: 16 G | Refills: 1 | Status: SHIPPED | OUTPATIENT
Start: 2021-06-17 | End: 2021-08-12

## 2021-06-17 ASSESSMENT — FIBROSIS 4 INDEX: FIB4 SCORE: 1.97

## 2021-06-17 NOTE — PROGRESS NOTES
Subjective:   CC: persistent cough    HPI:     Price Moon is a 75 y.o. male, established patient of the clinic, presents with the following concerns:     Patient was in his normal state of health until a few months ago when he developes acute hacking cough, profuse rhinorrhea, needs to constantly clear his throat without fever, chills, nausea, sore throat, ear symptoms, SOB, PRICE. He is former smoker, but quit in his 30s. He does not have any chronic lung diseases. He tries to rinse his nose which appears to help. He does not cough with talking, with meals, or at night. Occasionally, he does have epigastric discomfort after eating, but no GI problems. He feels well otherwise. He presents to the clinic today per his wife's and son's request. Symptoms do not bother him at all. He is retired. He does lots of gardening, taking care of his lawn, and picking up dirt/dry leaves in his garden regularly.       Current medicines (including changes today)  Current Outpatient Medications   Medication Sig Dispense Refill   • fexofenadine (ALLEGRA) 180 MG tablet Take 1 tablet by mouth every day. 90 tablet 0   • fluticasone (FLONASE) 50 MCG/ACT nasal spray Administer 2 Sprays into affected nostril(S) every day. 16 g 1   • levothyroxine (SYNTHROID) 100 MCG Tab Take 1 tablet by mouth every morning on an empty stomach. 90 tablet 3   • atorvastatin (LIPITOR) 20 MG Tab Take 1 tablet by mouth at bedtime. 90 tablet 3   • Saw Palmetto, Serenoa repens, (SAW PALMETTO PO) Take 600 mg by mouth every day.     • triamcinolone acetonide (KENALOG) 0.1 % Cream Apply  to affected area(s) 2 times a day.     • bimatoprost (LUMIGAN) 0.01 % Solution Place 1 Drop in both eyes every bedtime.       No current facility-administered medications for this visit.     He  has a past medical history of BPH (benign prostatic hyperplasia), Hyperlipidemia, and Thyroid disease.    I personally reviewed patient's problem list, allergies, medications, family  "hx, social hx with patient and update EPIC.     REVIEW OF SYSTEMS:  CONSTITUTIONAL:  Denies night sweats, fatigue, malaise, lethargy, fever or chills.  RESPIRATORY:  Denies cough, wheeze, hemoptysis, or shortness of breath.  CARDIOVASCULAR:  Denies chest pains, palpitations, pedal edema     Objective:     /72 (BP Location: Right arm, Patient Position: Sitting, BP Cuff Size: Adult long)   Pulse 83   Temp 37.1 °C (98.7 °F) (Temporal)   Ht 1.765 m (5' 9.5\")   Wt 119 kg (263 lb)   SpO2 92%  Body mass index is 38.28 kg/m².    Physical Exam:  Constitutional: awake, alert, in no distress.  Skin: Warm, dry, good turgor, no rashes, bruises, ulcers in visible areas.  Eye: conjunctiva clear, lids neg for edema or lesions.  ENMT: mild bulging TMs bilaterally. Pale nasal mucosa with inferior nasal turbinate hypertrophy. Lips without lesions, hyperemic oropharynx.  Neck: Trachea midline, no masses, no thyromegaly. No cervical or supraclavicular lymphadenopathy  Respiratory: Unlabored respiratory effort, lungs clear to auscultation, no wheezes, no rales.  Cardiovascular: Normal S1, S2, no murmur, no pedal edema.  Psych: Oriented x3, affect and mood wnl, intact judgement and insight.       Assessment and Plan:   The following treatment plan was discussed    1. Allergic rhinitis, unspecified seasonality, unspecified trigger  2. Persistent cough  Hx and exam are concerning for untreated allergic rhinitis. GERD is possible as well.   - nasal saline irrigation.   - fexofenadine (ALLEGRA) 180 MG tablet; Take 1 tablet by mouth every day.  Dispense: 90 tablet; Refill: 0  - fluticasone (FLONASE) 50 MCG/ACT nasal spray; Administer 2 Sprays into affected nostril(S) every day.  Dispense: 16 g; Refill: 1  - pt is to email me in a few weeks if symptoms fail to improve. Can start a trial of PPI for persistent cough.     Shanda Lu M.D.      Followup: Return for As needed.    Please note that this dictation was created using voice " recognition software. I have made every reasonable attempt to correct obvious errors, but I expect that there are errors of grammar and possibly content that I did not discover before finalizing the note.

## 2021-08-12 DIAGNOSIS — J30.9 ALLERGIC RHINITIS, UNSPECIFIED SEASONALITY, UNSPECIFIED TRIGGER: ICD-10-CM

## 2021-08-12 RX ORDER — FLUTICASONE PROPIONATE 50 MCG
2 SPRAY, SUSPENSION (ML) NASAL DAILY
Qty: 16 ML | Refills: 3 | Status: SHIPPED | OUTPATIENT
Start: 2021-08-12 | End: 2021-10-25

## 2021-09-28 ENCOUNTER — PATIENT MESSAGE (OUTPATIENT)
Dept: MEDICAL GROUP | Age: 76
End: 2021-09-28

## 2021-09-28 DIAGNOSIS — R09.89 RESPIRATORY SYMPTOMS: ICD-10-CM

## 2021-10-05 ENCOUNTER — HOSPITAL ENCOUNTER (OUTPATIENT)
Dept: LAB | Facility: MEDICAL CENTER | Age: 76
End: 2021-10-05
Attending: FAMILY MEDICINE
Payer: MEDICARE

## 2021-10-05 DIAGNOSIS — R09.89 RESPIRATORY SYMPTOMS: ICD-10-CM

## 2021-10-05 LAB — COVID ORDER STATUS COVID19: NORMAL

## 2021-10-05 PROCEDURE — U0003 INFECTIOUS AGENT DETECTION BY NUCLEIC ACID (DNA OR RNA); SEVERE ACUTE RESPIRATORY SYNDROME CORONAVIRUS 2 (SARS-COV-2) (CORONAVIRUS DISEASE [COVID-19]), AMPLIFIED PROBE TECHNIQUE, MAKING USE OF HIGH THROUGHPUT TECHNOLOGIES AS DESCRIBED BY CMS-2020-01-R: HCPCS

## 2021-10-05 PROCEDURE — U0005 INFEC AGEN DETEC AMPLI PROBE: HCPCS

## 2021-10-05 PROCEDURE — C9803 HOPD COVID-19 SPEC COLLECT: HCPCS

## 2021-10-06 LAB
SARS-COV-2 RNA RESP QL NAA+PROBE: NOTDETECTED
SPECIMEN SOURCE: NORMAL

## 2021-10-19 LAB
ALBUMIN SERPL-MCNC: 4.1 G/DL (ref 3.7–4.7)
ALBUMIN/GLOB SERPL: 1.4 {RATIO} (ref 1.2–2.2)
ALP SERPL-CCNC: 104 IU/L (ref 44–121)
ALT SERPL-CCNC: 21 IU/L (ref 0–44)
AST SERPL-CCNC: 22 IU/L (ref 0–40)
BASOPHILS # BLD AUTO: 0 X10E3/UL (ref 0–0.2)
BASOPHILS NFR BLD AUTO: 1 %
BILIRUB SERPL-MCNC: 0.8 MG/DL (ref 0–1.2)
BUN SERPL-MCNC: 21 MG/DL (ref 8–27)
BUN/CREAT SERPL: 19 (ref 10–24)
CALCIUM SERPL-MCNC: 9.2 MG/DL (ref 8.6–10.2)
CHLORIDE SERPL-SCNC: 103 MMOL/L (ref 96–106)
CHOLEST SERPL-MCNC: 130 MG/DL (ref 100–199)
CO2 SERPL-SCNC: 24 MMOL/L (ref 20–29)
CREAT SERPL-MCNC: 1.08 MG/DL (ref 0.76–1.27)
EOSINOPHIL # BLD AUTO: 0 X10E3/UL (ref 0–0.4)
EOSINOPHIL NFR BLD AUTO: 1 %
ERYTHROCYTE [DISTWIDTH] IN BLOOD BY AUTOMATED COUNT: 13 % (ref 11.6–15.4)
GLOBULIN SER CALC-MCNC: 3 G/DL (ref 1.5–4.5)
GLUCOSE SERPL-MCNC: 94 MG/DL (ref 65–99)
HCT VFR BLD AUTO: 49 % (ref 37.5–51)
HDLC SERPL-MCNC: 41 MG/DL
HGB BLD-MCNC: 16.3 G/DL (ref 13–17.7)
IMM GRANULOCYTES # BLD AUTO: 0 X10E3/UL (ref 0–0.1)
IMM GRANULOCYTES NFR BLD AUTO: 0 %
IMMATURE CELLS  115398: NORMAL
LABORATORY COMMENT REPORT: NORMAL
LDLC SERPL CALC-MCNC: 68 MG/DL (ref 0–99)
LYMPHOCYTES # BLD AUTO: 1.8 X10E3/UL (ref 0.7–3.1)
LYMPHOCYTES NFR BLD AUTO: 30 %
MCH RBC QN AUTO: 30.6 PG (ref 26.6–33)
MCHC RBC AUTO-ENTMCNC: 33.3 G/DL (ref 31.5–35.7)
MCV RBC AUTO: 92 FL (ref 79–97)
MONOCYTES # BLD AUTO: 0.5 X10E3/UL (ref 0.1–0.9)
MONOCYTES NFR BLD AUTO: 8 %
MORPHOLOGY BLD-IMP: NORMAL
NEUTROPHILS # BLD AUTO: 3.5 X10E3/UL (ref 1.4–7)
NEUTROPHILS NFR BLD AUTO: 60 %
NRBC BLD AUTO-RTO: NORMAL %
PLATELET # BLD AUTO: 202 X10E3/UL (ref 150–450)
POTASSIUM SERPL-SCNC: 4.6 MMOL/L (ref 3.5–5.2)
PROT SERPL-MCNC: 7.1 G/DL (ref 6–8.5)
RBC # BLD AUTO: 5.33 X10E6/UL (ref 4.14–5.8)
SODIUM SERPL-SCNC: 142 MMOL/L (ref 134–144)
T4 FREE SERPL-MCNC: 1.21 NG/DL (ref 0.82–1.77)
TRIGL SERPL-MCNC: 118 MG/DL (ref 0–149)
TSH SERPL DL<=0.005 MIU/L-ACNC: 3.8 UIU/ML (ref 0.45–4.5)
VLDLC SERPL CALC-MCNC: 21 MG/DL (ref 5–40)
WBC # BLD AUTO: 5.8 X10E3/UL (ref 3.4–10.8)

## 2021-10-25 ENCOUNTER — OFFICE VISIT (OUTPATIENT)
Dept: MEDICAL GROUP | Age: 76
End: 2021-10-25
Payer: MEDICARE

## 2021-10-25 VITALS
SYSTOLIC BLOOD PRESSURE: 120 MMHG | TEMPERATURE: 97.4 F | HEIGHT: 70 IN | DIASTOLIC BLOOD PRESSURE: 72 MMHG | WEIGHT: 267 LBS | BODY MASS INDEX: 38.22 KG/M2 | HEART RATE: 56 BPM | OXYGEN SATURATION: 93 %

## 2021-10-25 DIAGNOSIS — E78.00 PURE HYPERCHOLESTEROLEMIA: ICD-10-CM

## 2021-10-25 DIAGNOSIS — J30.9 ALLERGIC RHINITIS, UNSPECIFIED SEASONALITY, UNSPECIFIED TRIGGER: ICD-10-CM

## 2021-10-25 DIAGNOSIS — E66.9 OBESITY (BMI 30-39.9): ICD-10-CM

## 2021-10-25 DIAGNOSIS — E03.4 HYPOTHYROIDISM DUE TO ACQUIRED ATROPHY OF THYROID: ICD-10-CM

## 2021-10-25 DIAGNOSIS — N40.1 BENIGN PROSTATIC HYPERPLASIA WITH LOWER URINARY TRACT SYMPTOMS, SYMPTOM DETAILS UNSPECIFIED: ICD-10-CM

## 2021-10-25 PROCEDURE — 99214 OFFICE O/P EST MOD 30 MIN: CPT | Performed by: FAMILY MEDICINE

## 2021-10-25 RX ORDER — FEXOFENADINE HCL 180 MG/1
180 TABLET ORAL DAILY
Qty: 90 TABLET | Refills: 1 | Status: SHIPPED | OUTPATIENT
Start: 2021-10-25 | End: 2022-10-27

## 2021-10-25 RX ORDER — IPRATROPIUM BROMIDE 42 UG/1
2 SPRAY, METERED NASAL 4 TIMES DAILY
Qty: 15 ML | Refills: 5 | Status: SHIPPED | OUTPATIENT
Start: 2021-10-25 | End: 2022-10-27

## 2021-10-25 RX ORDER — AZELASTINE 1 MG/ML
2 SPRAY, METERED NASAL 2 TIMES DAILY
Qty: 30 ML | Refills: 5 | Status: SHIPPED | OUTPATIENT
Start: 2021-10-25 | End: 2022-10-27

## 2021-10-25 ASSESSMENT — FIBROSIS 4 INDEX: FIB4 SCORE: 1.81

## 2021-10-27 NOTE — PROGRESS NOTES
Subjective:   CC: Hypothyroid follow-up    HPI:     Price Moon is a 76 y.o. male, established patient of the clinic.     Patient is currently hypothyroidism, hyperlipidemia, BPH, obesity, allergies.  ..  Patient is taking all medications as directed.  He tolerates them well, no side effects reported.  Allergic rhinitis is partially controlled with Allegra as well as start standing nasal spray.  Patient states that Flonase was ineffective.  He continues to have copious nasal discharge that is bothersome.  He is active and continues to exercise regularly to control chronic osteoarthritic pain.  Negative history of drugs, alcohol, tobacco abuse.    Current medicines (including changes today)  Current Outpatient Medications   Medication Sig Dispense Refill   • azelastine (ASTELIN) 137 MCG/SPRAY nasal spray Administer 2 Sprays into affected nostril(S) 2 times a day. 30 mL 5   • fexofenadine (ALLEGRA) 180 MG tablet Take 1 Tablet by mouth every day. 90 Tablet 1   • ipratropium (ATROVENT) 0.06 % Solution Administer 2 Sprays into affected nostril(S) 4 times a day. 15 mL 5   • levothyroxine (SYNTHROID) 100 MCG Tab Take 1 tablet by mouth every morning on an empty stomach. 90 tablet 3   • atorvastatin (LIPITOR) 20 MG Tab Take 1 tablet by mouth at bedtime. 90 tablet 3   • Saw Palmetto, Serenoa repens, (SAW PALMETTO PO) Take 600 mg by mouth every day.     • triamcinolone acetonide (KENALOG) 0.1 % Cream Apply  to affected area(s) 2 times a day.     • bimatoprost (LUMIGAN) 0.01 % Solution Place 1 Drop in both eyes every bedtime.       No current facility-administered medications for this visit.     He  has a past medical history of BPH (benign prostatic hyperplasia), Hyperlipidemia, and Thyroid disease.    I personally reviewed patient's problem list, allergies, medications, family hx, social hx with patient and update EPIC.     REVIEW OF SYSTEMS:  CONSTITUTIONAL:  Denies night sweats, fatigue, malaise, lethargy,  "fever or chills.  RESPIRATORY:  Denies cough, wheeze, hemoptysis, or shortness of breath.  CARDIOVASCULAR:  Denies chest pains, palpitations, pedal edema     Objective:     /72 (BP Location: Right arm, Patient Position: Sitting, BP Cuff Size: Adult long)   Pulse (!) 56   Temp 36.3 °C (97.4 °F) (Temporal)   Ht 1.765 m (5' 9.5\")   Wt 121 kg (267 lb)   SpO2 93%  Body mass index is 38.86 kg/m².    Physical Exam:  Constitutional: awake, alert, in no distress.  Skin: Warm, dry, good turgor, no rashes, bruises, ulcers in visible areas.  Eye: conjunctiva clear, lids neg for edema or lesions.  Neck: Trachea midline, no masses, no thyromegaly. No cervical or supraclavicular lymphadenopathy  Respiratory: Unlabored respiratory effort, lungs clear to auscultation, no wheezes, no rales.  Cardiovascular: Normal S1, S2, no murmur, no pedal edema.  Psych: Oriented x3, affect and mood wnl, intact judgement and insight.       Assessment and Plan:   The following treatment plan was discussed    1. Allergic rhinitis, unspecified seasonality, unspecified trigger  - nasal saline irrigation   - azelastine (ASTELIN) 137 MCG/SPRAY nasal spray; Administer 2 Sprays into affected nostril(S) 2 times a day.  Dispense: 30 mL; Refill: 5  - fexofenadine (ALLEGRA) 180 MG tablet; Take 1 Tablet by mouth every day.  Dispense: 90 Tablet; Refill: 1  - ipratropium (ATROVENT) 0.06 % Solution; Administer 2 Sprays into affected nostril(S) 4 times a day.  Dispense: 15 mL; Refill: 5    2. Obesity (BMI 30-39.9)  - Patient identified as having weight management issue.  Appropriate orders and counseling given.     3. Benign prostatic hyperplasia with lower urinary tract symptoms, symptom details unspecified  Controlled with saw palmetto.  We will continue to monitor.    4. Pure hypercholesterolemia  Chronic, controlled with Lipitor 20 mg nightly, no s/e reported, will continue.    - dietary modification, exercise, and weight loss.   - avoid alcohol, " drugs, tobacco products   - CBC WITH DIFFERENTIAL; Future  - Comp Metabolic Panel; Future  - Lipid Profile; Future    5. Hypothyroidism due to acquired atrophy of thyroid  Chronic, controlled with levothyroxine 100 mcg daily.  Normal thyroid function tests in October 2021.  -Continue levothyroxine 100 mcg daily.  - TSH; Future  - FREE THYROXINE; Future      Ly AZAEL Lu M.D.      Followup: Return in about 1 year (around 10/25/2022) for Annual wellness visit.    Please note that this dictation was created using voice recognition software. I have made every reasonable attempt to correct obvious errors, but I expect that there are errors of grammar and possibly content that I did not discover before finalizing the note.

## 2022-01-13 ENCOUNTER — APPOINTMENT (RX ONLY)
Dept: URBAN - METROPOLITAN AREA CLINIC 4 | Facility: CLINIC | Age: 77
Setting detail: DERMATOLOGY
End: 2022-01-13

## 2022-01-13 DIAGNOSIS — L81.4 OTHER MELANIN HYPERPIGMENTATION: ICD-10-CM

## 2022-01-13 DIAGNOSIS — L82.1 OTHER SEBORRHEIC KERATOSIS: ICD-10-CM

## 2022-01-13 DIAGNOSIS — D22 MELANOCYTIC NEVI: ICD-10-CM

## 2022-01-13 DIAGNOSIS — L57.0 ACTINIC KERATOSIS: ICD-10-CM

## 2022-01-13 DIAGNOSIS — Z71.89 OTHER SPECIFIED COUNSELING: ICD-10-CM

## 2022-01-13 DIAGNOSIS — D18.0 HEMANGIOMA: ICD-10-CM

## 2022-01-13 DIAGNOSIS — Z85.820 PERSONAL HISTORY OF MALIGNANT MELANOMA OF SKIN: ICD-10-CM

## 2022-01-13 PROBLEM — D22.72 MELANOCYTIC NEVI OF LEFT LOWER LIMB, INCLUDING HIP: Status: ACTIVE | Noted: 2022-01-13

## 2022-01-13 PROBLEM — D22.62 MELANOCYTIC NEVI OF LEFT UPPER LIMB, INCLUDING SHOULDER: Status: ACTIVE | Noted: 2022-01-13

## 2022-01-13 PROBLEM — D18.01 HEMANGIOMA OF SKIN AND SUBCUTANEOUS TISSUE: Status: ACTIVE | Noted: 2022-01-13

## 2022-01-13 PROBLEM — D22.61 MELANOCYTIC NEVI OF RIGHT UPPER LIMB, INCLUDING SHOULDER: Status: ACTIVE | Noted: 2022-01-13

## 2022-01-13 PROBLEM — D22.71 MELANOCYTIC NEVI OF RIGHT LOWER LIMB, INCLUDING HIP: Status: ACTIVE | Noted: 2022-01-13

## 2022-01-13 PROBLEM — D22.5 MELANOCYTIC NEVI OF TRUNK: Status: ACTIVE | Noted: 2022-01-13

## 2022-01-13 PROCEDURE — ? COUNSELING

## 2022-01-13 PROCEDURE — 99213 OFFICE O/P EST LOW 20 MIN: CPT | Mod: 25

## 2022-01-13 PROCEDURE — ? LIQUID NITROGEN

## 2022-01-13 PROCEDURE — 17004 DESTROY PREMAL LESIONS 15/>: CPT

## 2022-01-13 PROCEDURE — ? OBSERVATION

## 2022-01-13 ASSESSMENT — LOCATION DETAILED DESCRIPTION DERM
LOCATION DETAILED: LEFT INFERIOR LATERAL FOREHEAD
LOCATION DETAILED: LEFT SUPERIOR LATERAL UPPER BACK
LOCATION DETAILED: RIGHT PROXIMAL CALF
LOCATION DETAILED: LEFT PROXIMAL CALF
LOCATION DETAILED: RIGHT CENTRAL ZYGOMA
LOCATION DETAILED: LEFT PROXIMAL DORSAL FOREARM
LOCATION DETAILED: LEFT CENTRAL TEMPLE
LOCATION DETAILED: RIGHT SUPERIOR PARIETAL SCALP
LOCATION DETAILED: RIGHT LATERAL SUPERIOR CHEST
LOCATION DETAILED: LEFT LATERAL EYEBROW
LOCATION DETAILED: LEFT PROXIMAL POSTERIOR UPPER ARM
LOCATION DETAILED: RIGHT INFERIOR FOREHEAD
LOCATION DETAILED: RIGHT SUPERIOR CRUS OF ANTIHELIX
LOCATION DETAILED: INFERIOR THORACIC SPINE
LOCATION DETAILED: LEFT MEDIAL FOREHEAD
LOCATION DETAILED: LEFT SUPERIOR OCCIPITAL SCALP
LOCATION DETAILED: LEFT DISTAL POSTERIOR THIGH
LOCATION DETAILED: RIGHT MEDIAL INFERIOR CHEST
LOCATION DETAILED: RIGHT MEDIAL TEMPLE
LOCATION DETAILED: LEFT POSTERIOR SHOULDER
LOCATION DETAILED: LEFT MEDIAL FRONTAL SCALP
LOCATION DETAILED: LEFT VENTRAL DISTAL FOREARM
LOCATION DETAILED: RIGHT ANTERIOR DISTAL THIGH
LOCATION DETAILED: LEFT VENTRAL PROXIMAL FOREARM
LOCATION DETAILED: LEFT SUPERIOR HELIX
LOCATION DETAILED: LEFT ANTERIOR DISTAL THIGH
LOCATION DETAILED: LEFT CLAVICULAR NECK
LOCATION DETAILED: LEFT SUPERIOR PARIETAL SCALP
LOCATION DETAILED: LEFT MEDIAL SUPERIOR CHEST
LOCATION DETAILED: LEFT INFERIOR FOREHEAD
LOCATION DETAILED: RIGHT DISTAL POSTERIOR UPPER ARM
LOCATION DETAILED: NASAL SUPRATIP
LOCATION DETAILED: RIGHT VENTRAL DISTAL FOREARM
LOCATION DETAILED: RIGHT DISTAL POSTERIOR THIGH
LOCATION DETAILED: RIGHT VENTRAL PROXIMAL FOREARM
LOCATION DETAILED: RIGHT POSTERIOR SHOULDER
LOCATION DETAILED: RIGHT PROXIMAL DORSAL FOREARM
LOCATION DETAILED: LEFT LATERAL SUPERIOR CHEST

## 2022-01-13 ASSESSMENT — LOCATION SIMPLE DESCRIPTION DERM
LOCATION SIMPLE: RIGHT TEMPLE
LOCATION SIMPLE: LEFT FOREARM
LOCATION SIMPLE: LEFT ANTERIOR NECK
LOCATION SIMPLE: RIGHT THIGH
LOCATION SIMPLE: LEFT UPPER BACK
LOCATION SIMPLE: RIGHT POSTERIOR THIGH
LOCATION SIMPLE: LEFT SHOULDER
LOCATION SIMPLE: RIGHT ZYGOMA
LOCATION SIMPLE: LEFT EYEBROW
LOCATION SIMPLE: UPPER BACK
LOCATION SIMPLE: LEFT CALF
LOCATION SIMPLE: LEFT SCALP
LOCATION SIMPLE: LEFT THIGH
LOCATION SIMPLE: RIGHT CALF
LOCATION SIMPLE: LEFT EAR
LOCATION SIMPLE: SCALP
LOCATION SIMPLE: RIGHT UPPER ARM
LOCATION SIMPLE: LEFT TEMPLE
LOCATION SIMPLE: RIGHT FOREHEAD
LOCATION SIMPLE: RIGHT FOREARM
LOCATION SIMPLE: CHEST
LOCATION SIMPLE: LEFT POSTERIOR THIGH
LOCATION SIMPLE: RIGHT EAR
LOCATION SIMPLE: LEFT OCCIPITAL SCALP
LOCATION SIMPLE: RIGHT SHOULDER
LOCATION SIMPLE: LEFT UPPER ARM
LOCATION SIMPLE: NOSE
LOCATION SIMPLE: LEFT FOREHEAD

## 2022-01-13 ASSESSMENT — LOCATION ZONE DERM
LOCATION ZONE: LEG
LOCATION ZONE: NOSE
LOCATION ZONE: TRUNK
LOCATION ZONE: ARM
LOCATION ZONE: EAR
LOCATION ZONE: NECK
LOCATION ZONE: FACE
LOCATION ZONE: SCALP

## 2022-01-13 NOTE — PROCEDURE: LIQUID NITROGEN
Show Applicator Variable?: Yes
Consent: The patient's consent was obtained including but not limited to risks of crusting, scabbing, blistering, scarring, darker or lighter pigmentary change, recurrence, incomplete removal and infection.
Detail Level: Detailed
Render Post-Care Instructions In Note?: no
Duration Of Freeze Thaw-Cycle (Seconds): 0
Post-Care Instructions: I reviewed with the patient in detail post-care instructions. Patient is to wear sunprotection, and avoid picking at any of the treated lesions. Pt may apply Vaseline to crusted or scabbing areas.
Medical Necessity Information: It is in your best interest to select a reason for this procedure from the list below. All of these items fulfill various CMS LCD requirements except the new and changing color options.
Medical Necessity Clause: This procedure was medically necessary because the lesions that were treated were:  If lesion does not resolve, bx is needed.
Spray Paint Text: The liquid nitrogen was applied to the skin utilizing a spray paint frosting technique.

## 2022-03-10 ENCOUNTER — PATIENT MESSAGE (OUTPATIENT)
Dept: MEDICAL GROUP | Age: 77
End: 2022-03-10
Payer: MEDICARE

## 2022-03-10 DIAGNOSIS — M25.552 CHRONIC LEFT HIP PAIN: ICD-10-CM

## 2022-03-10 DIAGNOSIS — G89.29 CHRONIC LEFT HIP PAIN: ICD-10-CM

## 2022-03-10 NOTE — TELEPHONE ENCOUNTER
From: Price Moon  To: Physician Shanda Lu  Sent: 3/10/2022 9:37 AM PST  Subject: Damaged hip muscles    I have had two examinations, x-rays by two orthopedic doctors and Dr. Ponce, a physiatrist. All indicated the hip joint is normal and no sign of arthritis. Despite continued attempts to self heal by exercise, the pain continues and is intensifying. I would like to see a physical therapist who has been recommended by family members. The name is Bert Arguello who works at [employer] on [address] in [facility]. Could you please give me a referral for their office? Thank you.

## 2022-03-11 NOTE — PROGRESS NOTES
Medardo, please make sure to copy the name of the requested provider (Bert Arguello ) into the note of the referral to avoid delay patient care.   Shanda Lu M.D.

## 2022-05-27 DIAGNOSIS — E03.4 HYPOTHYROIDISM DUE TO ACQUIRED ATROPHY OF THYROID: ICD-10-CM

## 2022-05-31 RX ORDER — LEVOTHYROXINE SODIUM 100 MCG
TABLET ORAL
Qty: 90 TABLET | Refills: 1 | Status: SHIPPED | OUTPATIENT
Start: 2022-05-31 | End: 2022-10-27 | Stop reason: SDUPTHER

## 2022-07-29 DIAGNOSIS — E78.00 PURE HYPERCHOLESTEROLEMIA: ICD-10-CM

## 2022-07-29 RX ORDER — ATORVASTATIN CALCIUM 20 MG/1
TABLET, FILM COATED ORAL
Qty: 90 TABLET | Refills: 0 | Status: SHIPPED | OUTPATIENT
Start: 2022-07-29 | End: 2022-10-27

## 2022-10-19 ENCOUNTER — HOSPITAL ENCOUNTER (OUTPATIENT)
Dept: LAB | Facility: MEDICAL CENTER | Age: 77
End: 2022-10-19
Attending: FAMILY MEDICINE
Payer: MEDICARE

## 2022-10-19 DIAGNOSIS — E03.4 HYPOTHYROIDISM DUE TO ACQUIRED ATROPHY OF THYROID: ICD-10-CM

## 2022-10-19 DIAGNOSIS — E78.00 PURE HYPERCHOLESTEROLEMIA: ICD-10-CM

## 2022-10-19 LAB
ALBUMIN SERPL BCP-MCNC: 4.1 G/DL (ref 3.2–4.9)
ALBUMIN/GLOB SERPL: 1.2 G/DL
ALP SERPL-CCNC: 98 U/L (ref 30–99)
ALT SERPL-CCNC: 18 U/L (ref 2–50)
ANION GAP SERPL CALC-SCNC: 9 MMOL/L (ref 7–16)
AST SERPL-CCNC: 15 U/L (ref 12–45)
BASOPHILS # BLD AUTO: 0.8 % (ref 0–1.8)
BASOPHILS # BLD: 0.04 K/UL (ref 0–0.12)
BILIRUB SERPL-MCNC: 0.6 MG/DL (ref 0.1–1.5)
BUN SERPL-MCNC: 23 MG/DL (ref 8–22)
CALCIUM SERPL-MCNC: 9.5 MG/DL (ref 8.5–10.5)
CHLORIDE SERPL-SCNC: 104 MMOL/L (ref 96–112)
CHOLEST SERPL-MCNC: 172 MG/DL (ref 100–199)
CO2 SERPL-SCNC: 28 MMOL/L (ref 20–33)
CREAT SERPL-MCNC: 0.96 MG/DL (ref 0.5–1.4)
EOSINOPHIL # BLD AUTO: 0.05 K/UL (ref 0–0.51)
EOSINOPHIL NFR BLD: 1.1 % (ref 0–6.9)
ERYTHROCYTE [DISTWIDTH] IN BLOOD BY AUTOMATED COUNT: 48.6 FL (ref 35.9–50)
FASTING STATUS PATIENT QL REPORTED: NORMAL
GFR SERPLBLD CREATININE-BSD FMLA CKD-EPI: 81 ML/MIN/1.73 M 2
GLOBULIN SER CALC-MCNC: 3.3 G/DL (ref 1.9–3.5)
GLUCOSE SERPL-MCNC: 97 MG/DL (ref 65–99)
HCT VFR BLD AUTO: 47.5 % (ref 42–52)
HDLC SERPL-MCNC: 43 MG/DL
HGB BLD-MCNC: 15.5 G/DL (ref 14–18)
IMM GRANULOCYTES # BLD AUTO: 0.01 K/UL (ref 0–0.11)
IMM GRANULOCYTES NFR BLD AUTO: 0.2 % (ref 0–0.9)
LDLC SERPL CALC-MCNC: 115 MG/DL
LYMPHOCYTES # BLD AUTO: 1.49 K/UL (ref 1–4.8)
LYMPHOCYTES NFR BLD: 31.5 % (ref 22–41)
MCH RBC QN AUTO: 30.9 PG (ref 27–33)
MCHC RBC AUTO-ENTMCNC: 32.6 G/DL (ref 33.7–35.3)
MCV RBC AUTO: 94.8 FL (ref 81.4–97.8)
MONOCYTES # BLD AUTO: 0.41 K/UL (ref 0–0.85)
MONOCYTES NFR BLD AUTO: 8.7 % (ref 0–13.4)
NEUTROPHILS # BLD AUTO: 2.73 K/UL (ref 1.82–7.42)
NEUTROPHILS NFR BLD: 57.7 % (ref 44–72)
NRBC # BLD AUTO: 0 K/UL
NRBC BLD-RTO: 0 /100 WBC
PLATELET # BLD AUTO: 189 K/UL (ref 164–446)
PMV BLD AUTO: 9.7 FL (ref 9–12.9)
POTASSIUM SERPL-SCNC: 4.8 MMOL/L (ref 3.6–5.5)
PROT SERPL-MCNC: 7.4 G/DL (ref 6–8.2)
RBC # BLD AUTO: 5.01 M/UL (ref 4.7–6.1)
SODIUM SERPL-SCNC: 141 MMOL/L (ref 135–145)
T4 FREE SERPL-MCNC: 1.18 NG/DL (ref 0.93–1.7)
TRIGL SERPL-MCNC: 69 MG/DL (ref 0–149)
TSH SERPL DL<=0.005 MIU/L-ACNC: 5.09 UIU/ML (ref 0.38–5.33)
WBC # BLD AUTO: 4.7 K/UL (ref 4.8–10.8)

## 2022-10-19 PROCEDURE — 84443 ASSAY THYROID STIM HORMONE: CPT

## 2022-10-19 PROCEDURE — 80053 COMPREHEN METABOLIC PANEL: CPT

## 2022-10-19 PROCEDURE — 85025 COMPLETE CBC W/AUTO DIFF WBC: CPT

## 2022-10-19 PROCEDURE — 84439 ASSAY OF FREE THYROXINE: CPT

## 2022-10-19 PROCEDURE — 36415 COLL VENOUS BLD VENIPUNCTURE: CPT

## 2022-10-19 PROCEDURE — 80061 LIPID PANEL: CPT

## 2022-10-26 SDOH — ECONOMIC STABILITY: FOOD INSECURITY: WITHIN THE PAST 12 MONTHS, THE FOOD YOU BOUGHT JUST DIDN'T LAST AND YOU DIDN'T HAVE MONEY TO GET MORE.: NEVER TRUE

## 2022-10-26 SDOH — ECONOMIC STABILITY: HOUSING INSECURITY: IN THE LAST 12 MONTHS, HOW MANY PLACES HAVE YOU LIVED?: 1

## 2022-10-26 SDOH — HEALTH STABILITY: PHYSICAL HEALTH: ON AVERAGE, HOW MANY MINUTES DO YOU ENGAGE IN EXERCISE AT THIS LEVEL?: 40 MIN

## 2022-10-26 SDOH — ECONOMIC STABILITY: FOOD INSECURITY: WITHIN THE PAST 12 MONTHS, YOU WORRIED THAT YOUR FOOD WOULD RUN OUT BEFORE YOU GOT MONEY TO BUY MORE.: NEVER TRUE

## 2022-10-26 SDOH — HEALTH STABILITY: PHYSICAL HEALTH: ON AVERAGE, HOW MANY DAYS PER WEEK DO YOU ENGAGE IN MODERATE TO STRENUOUS EXERCISE (LIKE A BRISK WALK)?: 7 DAYS

## 2022-10-26 SDOH — ECONOMIC STABILITY: TRANSPORTATION INSECURITY
IN THE PAST 12 MONTHS, HAS LACK OF TRANSPORTATION KEPT YOU FROM MEETINGS, WORK, OR FROM GETTING THINGS NEEDED FOR DAILY LIVING?: NO

## 2022-10-26 SDOH — ECONOMIC STABILITY: INCOME INSECURITY: HOW HARD IS IT FOR YOU TO PAY FOR THE VERY BASICS LIKE FOOD, HOUSING, MEDICAL CARE, AND HEATING?: NOT HARD AT ALL

## 2022-10-26 SDOH — ECONOMIC STABILITY: INCOME INSECURITY: IN THE LAST 12 MONTHS, WAS THERE A TIME WHEN YOU WERE NOT ABLE TO PAY THE MORTGAGE OR RENT ON TIME?: NO

## 2022-10-26 ASSESSMENT — SOCIAL DETERMINANTS OF HEALTH (SDOH)
HOW OFTEN DO YOU ATTENT MEETINGS OF THE CLUB OR ORGANIZATION YOU BELONG TO?: NEVER
DO YOU BELONG TO ANY CLUBS OR ORGANIZATIONS SUCH AS CHURCH GROUPS UNIONS, FRATERNAL OR ATHLETIC GROUPS, OR SCHOOL GROUPS?: NO
HOW OFTEN DO YOU GET TOGETHER WITH FRIENDS OR RELATIVES?: ONCE A WEEK
IN A TYPICAL WEEK, HOW MANY TIMES DO YOU TALK ON THE PHONE WITH FAMILY, FRIENDS, OR NEIGHBORS?: MORE THAN THREE TIMES A WEEK
HOW OFTEN DO YOU ATTENT MEETINGS OF THE CLUB OR ORGANIZATION YOU BELONG TO?: NEVER
HOW MANY DRINKS CONTAINING ALCOHOL DO YOU HAVE ON A TYPICAL DAY WHEN YOU ARE DRINKING: PATIENT DOES NOT DRINK
HOW OFTEN DO YOU ATTEND CHURCH OR RELIGIOUS SERVICES?: NEVER
HOW OFTEN DO YOU ATTEND CHURCH OR RELIGIOUS SERVICES?: NEVER
IN A TYPICAL WEEK, HOW MANY TIMES DO YOU TALK ON THE PHONE WITH FAMILY, FRIENDS, OR NEIGHBORS?: MORE THAN THREE TIMES A WEEK
HOW HARD IS IT FOR YOU TO PAY FOR THE VERY BASICS LIKE FOOD, HOUSING, MEDICAL CARE, AND HEATING?: NOT HARD AT ALL
HOW OFTEN DO YOU HAVE SIX OR MORE DRINKS ON ONE OCCASION: NEVER
WITHIN THE PAST 12 MONTHS, YOU WORRIED THAT YOUR FOOD WOULD RUN OUT BEFORE YOU GOT THE MONEY TO BUY MORE: NEVER TRUE
HOW OFTEN DO YOU GET TOGETHER WITH FRIENDS OR RELATIVES?: ONCE A WEEK
HOW OFTEN DO YOU HAVE A DRINK CONTAINING ALCOHOL: NEVER
DO YOU BELONG TO ANY CLUBS OR ORGANIZATIONS SUCH AS CHURCH GROUPS UNIONS, FRATERNAL OR ATHLETIC GROUPS, OR SCHOOL GROUPS?: NO

## 2022-10-26 ASSESSMENT — LIFESTYLE VARIABLES
AUDIT-C TOTAL SCORE: 0
HOW OFTEN DO YOU HAVE A DRINK CONTAINING ALCOHOL: NEVER
HOW OFTEN DO YOU HAVE SIX OR MORE DRINKS ON ONE OCCASION: NEVER
SKIP TO QUESTIONS 9-10: 1
HOW MANY STANDARD DRINKS CONTAINING ALCOHOL DO YOU HAVE ON A TYPICAL DAY: PATIENT DOES NOT DRINK

## 2022-10-27 ENCOUNTER — OFFICE VISIT (OUTPATIENT)
Dept: MEDICAL GROUP | Age: 77
End: 2022-10-27
Payer: MEDICARE

## 2022-10-27 VITALS
TEMPERATURE: 96.8 F | WEIGHT: 249 LBS | DIASTOLIC BLOOD PRESSURE: 68 MMHG | HEART RATE: 62 BPM | HEIGHT: 70 IN | BODY MASS INDEX: 35.65 KG/M2 | OXYGEN SATURATION: 99 % | SYSTOLIC BLOOD PRESSURE: 122 MMHG

## 2022-10-27 DIAGNOSIS — M25.552 CHRONIC LEFT HIP PAIN: ICD-10-CM

## 2022-10-27 DIAGNOSIS — Z00.00 MEDICARE ANNUAL WELLNESS VISIT, SUBSEQUENT: Primary | ICD-10-CM

## 2022-10-27 DIAGNOSIS — E66.9 OBESITY (BMI 30-39.9): ICD-10-CM

## 2022-10-27 DIAGNOSIS — M25.561 CHRONIC PAIN OF RIGHT KNEE: ICD-10-CM

## 2022-10-27 DIAGNOSIS — G89.29 CHRONIC PAIN OF RIGHT KNEE: ICD-10-CM

## 2022-10-27 DIAGNOSIS — G89.29 CHRONIC LEFT HIP PAIN: ICD-10-CM

## 2022-10-27 DIAGNOSIS — E55.9 VITAMIN D INSUFFICIENCY: ICD-10-CM

## 2022-10-27 DIAGNOSIS — E78.00 PURE HYPERCHOLESTEROLEMIA: ICD-10-CM

## 2022-10-27 DIAGNOSIS — M79.10 MYALGIA DUE TO STATIN: ICD-10-CM

## 2022-10-27 DIAGNOSIS — T46.6X5A MYALGIA DUE TO STATIN: ICD-10-CM

## 2022-10-27 DIAGNOSIS — E03.4 HYPOTHYROIDISM DUE TO ACQUIRED ATROPHY OF THYROID: ICD-10-CM

## 2022-10-27 DIAGNOSIS — N40.1 BENIGN PROSTATIC HYPERPLASIA WITH LOWER URINARY TRACT SYMPTOMS, SYMPTOM DETAILS UNSPECIFIED: ICD-10-CM

## 2022-10-27 DIAGNOSIS — Z23 NEED FOR VACCINATION: ICD-10-CM

## 2022-10-27 PROCEDURE — G0439 PPPS, SUBSEQ VISIT: HCPCS | Performed by: FAMILY MEDICINE

## 2022-10-27 RX ORDER — LEVOTHYROXINE SODIUM 0.1 MG/1
100 TABLET ORAL
Qty: 90 TABLET | Refills: 3 | Status: SHIPPED | OUTPATIENT
Start: 2022-10-27 | End: 2022-11-03

## 2022-10-27 ASSESSMENT — ENCOUNTER SYMPTOMS: GENERAL WELL-BEING: EXCELLENT

## 2022-10-27 ASSESSMENT — PATIENT HEALTH QUESTIONNAIRE - PHQ9: CLINICAL INTERPRETATION OF PHQ2 SCORE: 0

## 2022-10-27 ASSESSMENT — FIBROSIS 4 INDEX: FIB4 SCORE: 1.44

## 2022-10-27 ASSESSMENT — ACTIVITIES OF DAILY LIVING (ADL): BATHING_REQUIRES_ASSISTANCE: 0

## 2022-10-27 NOTE — PROGRESS NOTES
Chief Complaint   Patient presents with    Medicare Annual Wellness    Medication Reaction     Muscle pain from atorvastatin       HPI:  Price Moon is a 77 y.o. here for Medicare Annual Wellness Visit     Patient stopped taking statin a few weeks ago secondary to sudden development of diffuse muscle pain. His symptoms resolved after 3-4 days of discontinuation of Atorvastatin. He is not interested in trying other statin.     Patient Active Problem List    Diagnosis Date Noted    Myalgia due to statin 10/27/2022    Chronic pain of right knee 03/26/2021    Chronic left hip pain 04/25/2019    Obesity (BMI 30-39.9) 01/20/2017    Hypothyroidism due to acquired atrophy of thyroid 01/31/2016    BPH (benign prostatic hyperplasia) 01/20/2016    Pure hypercholesterolemia 01/20/2016       Current Outpatient Medications   Medication Sig Dispense Refill    levothyroxine (SYNTHROID) 100 MCG Tab Take 1 Tablet by mouth every morning on an empty stomach. 90 Tablet 3    Saw Palmetto, Serenoa repens, (SAW PALMETTO PO) Take 600 mg by mouth every day.      triamcinolone acetonide (KENALOG) 0.1 % Cream Apply  to affected area(s) 2 times a day.      bimatoprost (LUMIGAN) 0.01 % Solution Place 1 Drop in both eyes every bedtime.       No current facility-administered medications for this visit.          Current supplements as per medication list.     Allergies: Patient has no known allergies.    Current social contact/activities: family     He  reports that he quit smoking about 37 years ago. His smoking use included cigarettes. He has never used smokeless tobacco. He reports that he does not drink alcohol and does not use drugs.  Counseling given: Not Answered      ROS:    Gait: Uses no assistive device  Ostomy: No  Other tubes: No  Amputations: No  Chronic oxygen use: No  Last eye exam: 1 month ago  Wears hearing aids: No   : Denies any urinary leakage during the last 6 months    Screening:  Depression Screening  Little  interest or pleasure in doing things?  0 - not at all  Feeling down, depressed , or hopeless? 0 - not at all  Patient Health Questionnaire Score: 0     Screening for Cognitive Impairment  Three Minute Recall (daughter, heaven, mountain) 3/3    Jay clock face with all 12 numbers and set the hands to show 10 past 11.       Cognitive concerns identified deferred for follow up unless specifically addressed in assessment and plan.    Fall Risk Assessment  Has the patient had two or more falls in the last year or any fall with injury in the last year?  No    Safety Assessment  Throw rugs on floor.  No  Handrails on all stairs.  Yes  Good lighting in all hallways.  Yes  Difficulty hearing.  No  Patient counseled about all safety risks that were identified.    Functional Assessment ADLs  Are there any barriers preventing you from cooking for yourself or meeting nutritional needs?  No.    Are there any barriers preventing you from driving safely or obtaining transportation?  No.    Are there any barriers preventing you from using a telephone or calling for help?  No.    Are there any barriers preventing you from shopping?  No.    Are there any barriers preventing you from taking care of your own finances?  No.    Are there any barriers preventing you from managing your medications?  No.    Are there any barriers preventing you from showering, bathing or dressing yourself?  No.    Are you currently engaging in any exercise or physical activity?  Yes.     What is your perception of your health?  Excellent    Advance Care Planning  Do you have an Advance Directive, Living Will, Durable Power of , or POLST? Yes  Advance Directive Living Will Durable Power of    is on file      Health Maintenance Summary            Ordered - IMM DTaP/Tdap/Td Vaccine (1 - Tdap) Ordered on 10/27/2022      02/27/2013  Imm Admin: TD Vaccine              Annual Wellness Visit (Every 366 Days) Next due on 10/28/2023      10/27/2022   Visit Dx: Medicare annual wellness visit, subsequent    10/29/2020  Level of Service: [place] ANNUAL WELLNESS VISIT-INCLUDES PPPS SUBSEQUE*    10/29/2020  Visit Dx: Medicare annual wellness visit, subsequent    10/22/2019  Subsequent Annual Wellness Visit - Includes PPPS ()    10/22/2019  Visit Dx: Medicare annual wellness visit, subsequent    Only the first 5 history entries have been loaded, but more history exists.              IMM PNEUMOCOCCAL VACCINE: 65+ Years (Series Information) Completed      01/20/2017  Imm Admin: Pneumococcal Conjugate Vaccine (Prevnar/PCV-13)    05/24/2012  Imm Admin: Pneumococcal polysaccharide vaccine (PPSV-23)              HEPATITIS C SCREENING  Completed      04/20/2018  HEP C VIRUS ANTIBODY              IMM ZOSTER VACCINES (Series Information) Completed      07/01/2019  Imm Admin: Zoster Vaccine Recombinant (RZV) (SHINGRIX)    04/16/2019  Imm Admin: Zoster Vaccine Recombinant (RZV) (SHINGRIX)    08/06/2015  Imm Admin: Zoster Vaccine Live (ZVL) (Zostavax) - HISTORICAL DATA              COVID-19 Vaccine (Series Information) Completed      09/20/2022  Imm Admin: PFIZER BIVALENT BOOSTER SARS-COV-2 VACCINE (12+)    04/20/2022  Imm Admin: PFIZER PURPLE CAP SARS-COV-2 VACCINATION (12+)    10/23/2021  Imm Admin: PFIZER PURPLE CAP SARS-COV-2 VACCINATION (12+)    10/23/2021  Imm Admin: PFIZER PURPLE CAP SARS-COV-2 VACCINATION (12+)    02/11/2021  Imm Admin: PFIZER PURPLE CAP SARS-COV-2 VACCINATION (12+)    Only the first 5 history entries have been loaded, but more history exists.              IMM INFLUENZA (Series Information) Completed      10/10/2022  Imm Admin: Influenza Vaccine Adult HD    10/09/2021  Imm Admin: Influenza Vaccine Adult HD    10/05/2020  Imm Admin: Influenza Vaccine Adult HD    09/01/2020  Imm Admin: Influenza Vaccine Adult HD    10/22/2019  Imm Admin: Influenza Vaccine Adult HD    Only the first 5 history entries have been loaded, but more history exists.               "IMM MENINGOCOCCAL ACWY VACCINE (Series Information) Aged Out      No completion history exists for this topic.              Discontinued - COLORECTAL CANCER SCREENING  Discontinued      2019  REFERRAL TO GI FOR COLONOSCOPY    2014  COLONOSCOPY (Previously completed)              Discontinued - IMM HEP B VACCINE  Discontinued      No completion history exists for this topic.                    Patient Care Team:  Shanda Lu M.D. as PCP - General (Family Medicine)  Rosalva Kelly M.D. as Consulting Physician (Ophthalmology)  Verónica Oveido M.D. as Consulting Physician (Otolaryngology)  Cesar Gomes, PT as Physical Therapist (Physical Therapy)        Social History     Tobacco Use    Smoking status: Former     Types: Cigarettes     Quit date: 1985     Years since quittin.7    Smokeless tobacco: Never   Vaping Use    Vaping Use: Never used   Substance Use Topics    Alcohol use: No     Alcohol/week: 0.0 oz    Drug use: No     Family History   Problem Relation Age of Onset    Hypertension Mother     Other Father         ALS    Hypertension Brother     No Known Problems Son     No Known Problems Son     No Known Problems Son     No Known Problems Son     No Known Problems Maternal Grandmother     No Known Problems Maternal Grandfather     Heart Disease Paternal Grandmother     Hypertension Paternal Grandmother     No Known Problems Paternal Grandfather      He  has a past medical history of BPH (benign prostatic hyperplasia), Hyperlipidemia, and Thyroid disease.   Past Surgical History:   Procedure Laterality Date    EYE SURGERY      HERNIA REPAIR         Exam:   /68 (BP Location: Right arm, Patient Position: Sitting, BP Cuff Size: Large adult)   Pulse 62   Temp 36 °C (96.8 °F) (Temporal)   Ht 1.765 m (5' 9.5\")   Wt 113 kg (249 lb)   SpO2 99%  Body mass index is 36.24 kg/m².    Hearing excellent.    Dentition good  Alert, oriented in no acute distress.  Eye contact is good, " speech goal directed, affect calm    Assessment and Plan. The following treatment and monitoring plan is recommended:      1. Hypothyroidism due to acquired atrophy of thyroid  Chronic, controlled with 100 mcg qd, no s/e reported, will continue.    - TSH; Future  - levothyroxine (SYNTHROID) 100 MCG Tab; Take 1 Tablet by mouth every morning on an empty stomach.  Dispense: 90 Tablet; Refill: 3    2. Obesity (BMI 30-39.9)  - Patient identified as having weight management issue.  Appropriate orders and counseling given.    3. Pure hypercholesterolemia  4. Myalgia due to statin  Chronic, previously controlled with Lipitor 20 mg qd.   He discontinued Lipitor a few weeks ago due to development of statin diffuse myalgia.  His symptoms appear to improve after few days of discontinuation of atorvastatin.  He is [place].  He is active and try to exercise regularly.  Negative history of cardiovascular disease or CVA.  Patient is not interested in trying a different statin at this time.  - dietary modification, exercise, and weight loss.   - avoid alcohol, drugs, tobacco products   - CBC WITH DIFFERENTIAL; Future  - Comp Metabolic Panel; Future  - Lipid Profile; Future      5. Benign prostatic hyperplasia with lower urinary tract symptoms, symptom details unspecified  Chronic, mild, controlled with saw palmetto.  Will continue to monitor.    6. Chronic left hip pain  7. Chronic pain of right knee  Controlled with stretching exercises, chiropractic adjustment, activity modification.  Will continue to monitor.    8. Vitamin D insufficiency  - VITAMIN D,25 HYDROXY (DEFICIENCY); Future    9. Need for vaccination  - Tdap Vaccine =>6YO IM    10. Medicare annual wellness visit, subsequent  General health and wellness counseling provided.        Services suggested: No services needed at this time  Health Care Screening: Age-appropriate preventive services recommended by USPTF and ACIP covered by Medicare were discussed today. Services  ordered if indicated and agreed upon by the patient.  Referrals offered: Community-based lifestyle interventions to reduce health risks and promote self-management and wellness, fall prevention, nutrition, physical activity, tobacco-use cessation, weight loss, and mental health services as per orders if indicated.    Discussion today about general wellness and lifestyle habits:    Prevent falls and reduce trip hazards; Cautioned about securing or removing rugs.  Have a working fire alarm and carbon monoxide detector;   Engage in regular physical activity and social activities     Follow-up: Return in about 1 year (around 10/27/2023) for Annual wellness visit.

## 2022-11-03 DIAGNOSIS — E03.4 HYPOTHYROIDISM DUE TO ACQUIRED ATROPHY OF THYROID: ICD-10-CM

## 2022-11-03 RX ORDER — LEVOTHYROXINE SODIUM 100 MCG
TABLET ORAL
Qty: 90 TABLET | Refills: 1 | Status: SHIPPED | OUTPATIENT
Start: 2022-11-03 | End: 2023-06-17

## 2022-11-03 NOTE — TELEPHONE ENCOUNTER
Received request via: Pharmacy    Was the patient seen in the last year in this department? Yes    Does the patient have an active prescription (recently filled or refills available) for medication(s) requested?  Different pharmacy requested.

## 2022-11-10 ENCOUNTER — PATIENT MESSAGE (OUTPATIENT)
Dept: HEALTH INFORMATION MANAGEMENT | Facility: OTHER | Age: 77
End: 2022-11-10

## 2023-01-19 ENCOUNTER — APPOINTMENT (RX ONLY)
Dept: URBAN - METROPOLITAN AREA CLINIC 4 | Facility: CLINIC | Age: 78
Setting detail: DERMATOLOGY
End: 2023-01-19

## 2023-01-19 DIAGNOSIS — D18.0 HEMANGIOMA: ICD-10-CM

## 2023-01-19 DIAGNOSIS — L81.4 OTHER MELANIN HYPERPIGMENTATION: ICD-10-CM

## 2023-01-19 DIAGNOSIS — Z71.89 OTHER SPECIFIED COUNSELING: ICD-10-CM

## 2023-01-19 DIAGNOSIS — Z85.820 PERSONAL HISTORY OF MALIGNANT MELANOMA OF SKIN: ICD-10-CM

## 2023-01-19 DIAGNOSIS — L82.1 OTHER SEBORRHEIC KERATOSIS: ICD-10-CM

## 2023-01-19 DIAGNOSIS — D22 MELANOCYTIC NEVI: ICD-10-CM

## 2023-01-19 DIAGNOSIS — L21.8 OTHER SEBORRHEIC DERMATITIS: ICD-10-CM

## 2023-01-19 PROBLEM — D18.01 HEMANGIOMA OF SKIN AND SUBCUTANEOUS TISSUE: Status: ACTIVE | Noted: 2023-01-19

## 2023-01-19 PROBLEM — D22.71 MELANOCYTIC NEVI OF RIGHT LOWER LIMB, INCLUDING HIP: Status: ACTIVE | Noted: 2023-01-19

## 2023-01-19 PROBLEM — D22.5 MELANOCYTIC NEVI OF TRUNK: Status: ACTIVE | Noted: 2023-01-19

## 2023-01-19 PROBLEM — D22.72 MELANOCYTIC NEVI OF LEFT LOWER LIMB, INCLUDING HIP: Status: ACTIVE | Noted: 2023-01-19

## 2023-01-19 PROBLEM — D22.62 MELANOCYTIC NEVI OF LEFT UPPER LIMB, INCLUDING SHOULDER: Status: ACTIVE | Noted: 2023-01-19

## 2023-01-19 PROBLEM — D22.61 MELANOCYTIC NEVI OF RIGHT UPPER LIMB, INCLUDING SHOULDER: Status: ACTIVE | Noted: 2023-01-19

## 2023-01-19 PROCEDURE — ? COUNSELING

## 2023-01-19 PROCEDURE — 99213 OFFICE O/P EST LOW 20 MIN: CPT

## 2023-01-19 PROCEDURE — ? OBSERVATION

## 2023-01-19 PROCEDURE — ? PRESCRIPTION

## 2023-01-19 RX ORDER — KETOCONAZOLE 20 MG/G
1 CREAM TOPICAL BID
Qty: 60 | Refills: 12 | Status: ERX | COMMUNITY
Start: 2023-01-19

## 2023-01-19 RX ADMIN — KETOCONAZOLE 1: 20 CREAM TOPICAL at 00:00

## 2023-01-19 ASSESSMENT — LOCATION SIMPLE DESCRIPTION DERM
LOCATION SIMPLE: RIGHT CHEEK
LOCATION SIMPLE: RIGHT FOREARM
LOCATION SIMPLE: LEFT POPLITEAL SKIN
LOCATION SIMPLE: LEFT FOREARM
LOCATION SIMPLE: RIGHT CALF
LOCATION SIMPLE: RIGHT POPLITEAL SKIN
LOCATION SIMPLE: LEFT HAND
LOCATION SIMPLE: LEFT LOWER BACK
LOCATION SIMPLE: LEFT POSTERIOR THIGH
LOCATION SIMPLE: LEFT FOREARM
LOCATION SIMPLE: LEFT EAR
LOCATION SIMPLE: LEFT FOREHEAD
LOCATION SIMPLE: LEFT ELBOW
LOCATION SIMPLE: RIGHT UPPER ARM
LOCATION SIMPLE: UPPER BACK
LOCATION SIMPLE: RIGHT EAR
LOCATION SIMPLE: SCALP
LOCATION SIMPLE: LEFT CHEEK
LOCATION SIMPLE: CHEST
LOCATION SIMPLE: RIGHT LOWER BACK
LOCATION SIMPLE: RIGHT POSTERIOR THIGH
LOCATION SIMPLE: LEFT UPPER ARM
LOCATION SIMPLE: RIGHT HAND
LOCATION SIMPLE: RIGHT UPPER BACK
LOCATION SIMPLE: ABDOMEN
LOCATION SIMPLE: LEFT CALF

## 2023-01-19 ASSESSMENT — LOCATION DETAILED DESCRIPTION DERM
LOCATION DETAILED: LEFT SUPERIOR MEDIAL MIDBACK
LOCATION DETAILED: LEFT VENTRAL DISTAL FOREARM
LOCATION DETAILED: RIGHT PROXIMAL CALF
LOCATION DETAILED: LEFT MEDIAL FOREHEAD
LOCATION DETAILED: EPIGASTRIC SKIN
LOCATION DETAILED: RIGHT VENTRAL PROXIMAL FOREARM
LOCATION DETAILED: LEFT ANTECUBITAL SKIN
LOCATION DETAILED: RIGHT DISTAL POSTERIOR THIGH
LOCATION DETAILED: LEFT POPLITEAL SKIN
LOCATION DETAILED: RIGHT MEDIAL INFERIOR CHEST
LOCATION DETAILED: RIGHT ANTERIOR DISTAL UPPER ARM
LOCATION DETAILED: LEFT RADIAL DORSAL HAND
LOCATION DETAILED: RIGHT RADIAL DORSAL HAND
LOCATION DETAILED: PERIUMBILICAL SKIN
LOCATION DETAILED: LEFT SUPERIOR MEDIAL BUCCAL CHEEK
LOCATION DETAILED: RIGHT POPLITEAL SKIN
LOCATION DETAILED: RIGHT SUPERIOR MEDIAL BUCCAL CHEEK
LOCATION DETAILED: LEFT MEDIAL SUPERIOR CHEST
LOCATION DETAILED: RIGHT SUPERIOR PARIETAL SCALP
LOCATION DETAILED: INFERIOR THORACIC SPINE
LOCATION DETAILED: RIGHT VENTRAL DISTAL FOREARM
LOCATION DETAILED: LEFT PROXIMAL CALF
LOCATION DETAILED: XIPHOID
LOCATION DETAILED: RIGHT SUPERIOR HELIX
LOCATION DETAILED: RIGHT INFERIOR MEDIAL MIDBACK
LOCATION DETAILED: LEFT VENTRAL DISTAL FOREARM
LOCATION DETAILED: LEFT SCAPHA
LOCATION DETAILED: RIGHT ANTERIOR PROXIMAL UPPER ARM
LOCATION DETAILED: LEFT ANTERIOR DISTAL UPPER ARM
LOCATION DETAILED: LEFT DISTAL POSTERIOR THIGH
LOCATION DETAILED: RIGHT MEDIAL UPPER BACK
LOCATION DETAILED: RIGHT INFERIOR UPPER BACK

## 2023-01-19 ASSESSMENT — LOCATION ZONE DERM
LOCATION ZONE: EAR
LOCATION ZONE: FACE
LOCATION ZONE: SCALP
LOCATION ZONE: ARM
LOCATION ZONE: HAND
LOCATION ZONE: TRUNK
LOCATION ZONE: ARM
LOCATION ZONE: LEG

## 2023-03-07 ENCOUNTER — OFFICE VISIT (OUTPATIENT)
Dept: MEDICAL GROUP | Age: 78
End: 2023-03-07
Payer: MEDICARE

## 2023-03-07 VITALS
HEIGHT: 70 IN | OXYGEN SATURATION: 96 % | WEIGHT: 241 LBS | SYSTOLIC BLOOD PRESSURE: 122 MMHG | BODY MASS INDEX: 34.5 KG/M2 | HEART RATE: 70 BPM | DIASTOLIC BLOOD PRESSURE: 84 MMHG | TEMPERATURE: 97 F | RESPIRATION RATE: 16 BRPM

## 2023-03-07 DIAGNOSIS — S69.91XA INJURY OF FINGER OF RIGHT HAND, INITIAL ENCOUNTER: ICD-10-CM

## 2023-03-07 DIAGNOSIS — E66.9 OBESITY (BMI 30-39.9): ICD-10-CM

## 2023-03-07 DIAGNOSIS — M25.552 CHRONIC LEFT HIP PAIN: ICD-10-CM

## 2023-03-07 DIAGNOSIS — E78.00 PURE HYPERCHOLESTEROLEMIA: ICD-10-CM

## 2023-03-07 DIAGNOSIS — M79.644 FINGER PAIN, RIGHT: ICD-10-CM

## 2023-03-07 DIAGNOSIS — G89.29 CHRONIC LEFT HIP PAIN: ICD-10-CM

## 2023-03-07 PROCEDURE — 99214 OFFICE O/P EST MOD 30 MIN: CPT | Performed by: FAMILY MEDICINE

## 2023-03-07 ASSESSMENT — FIBROSIS 4 INDEX: FIB4 SCORE: 1.44

## 2023-03-07 ASSESSMENT — PATIENT HEALTH QUESTIONNAIRE - PHQ9: CLINICAL INTERPRETATION OF PHQ2 SCORE: 0
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
## 2023-05-12 ENCOUNTER — OFFICE VISIT (OUTPATIENT)
Dept: URGENT CARE | Facility: CLINIC | Age: 78
End: 2023-05-12
Payer: MEDICARE

## 2023-05-12 ENCOUNTER — APPOINTMENT (OUTPATIENT)
Dept: RADIOLOGY | Facility: IMAGING CENTER | Age: 78
End: 2023-05-12
Attending: NURSE PRACTITIONER
Payer: MEDICARE

## 2023-05-12 VITALS
HEIGHT: 70 IN | TEMPERATURE: 98.8 F | OXYGEN SATURATION: 92 % | HEART RATE: 72 BPM | BODY MASS INDEX: 33.64 KG/M2 | DIASTOLIC BLOOD PRESSURE: 84 MMHG | RESPIRATION RATE: 14 BRPM | WEIGHT: 235 LBS | SYSTOLIC BLOOD PRESSURE: 124 MMHG

## 2023-05-12 DIAGNOSIS — S99.921A FOOT INJURY, RIGHT, INITIAL ENCOUNTER: ICD-10-CM

## 2023-05-12 DIAGNOSIS — M19.079 ARTHRITIS OF FOOT: ICD-10-CM

## 2023-05-12 DIAGNOSIS — L03.115 CELLULITIS OF RIGHT LOWER EXTREMITY: ICD-10-CM

## 2023-05-12 PROCEDURE — 73630 X-RAY EXAM OF FOOT: CPT | Mod: TC,FY,RT | Performed by: NURSE PRACTITIONER

## 2023-05-12 PROCEDURE — 99213 OFFICE O/P EST LOW 20 MIN: CPT | Performed by: NURSE PRACTITIONER

## 2023-05-12 PROCEDURE — 3079F DIAST BP 80-89 MM HG: CPT | Performed by: NURSE PRACTITIONER

## 2023-05-12 PROCEDURE — 3074F SYST BP LT 130 MM HG: CPT | Performed by: NURSE PRACTITIONER

## 2023-05-12 RX ORDER — NAPROXEN 375 MG/1
375 TABLET ORAL 2 TIMES DAILY WITH MEALS
Qty: 28 TABLET | Refills: 0 | Status: SHIPPED | OUTPATIENT
Start: 2023-05-12 | End: 2023-05-26

## 2023-05-12 RX ORDER — CEPHALEXIN 500 MG/1
500 CAPSULE ORAL 4 TIMES DAILY
Qty: 28 CAPSULE | Refills: 0 | Status: SHIPPED | OUTPATIENT
Start: 2023-05-12 | End: 2023-05-19

## 2023-05-12 ASSESSMENT — FIBROSIS 4 INDEX: FIB4 SCORE: 1.44

## 2023-05-13 NOTE — PATIENT INSTRUCTIONS
-RICE Therapy: Rest, Ice, Compression, Elevation  -Can also take tylenol as directed for pain.   -Ace wrap    Follow up with PCP. Follow up for persistent or worsening symptoms, fever, chills, signs of infection. Emergently for severe uncontrolled pain, neurovascular compromise (decreased sensation, motion, or circulation).

## 2023-05-13 NOTE — PROGRESS NOTES
Subjective:     Price Moon is a 77 y.o. male who presents for Foot Swelling (X 9 days, dropped a battery RT foot, swelling, pain.)      Injury occurred last Thursday. Dropped weed eater battery on his right foot, 5 lbs. Has dorsal lateral foot pain. No numbness. Ha been hot. States he dropped the item on the distal medial aspect of his foot. Swelling started soon after. Redness x 2 days. States it's stiff at first, but once he starts walking on it, it's fine.  Pain eases as it looses up. Excedrine.      Foot Swelling  This is a new problem. Associated symptoms include joint swelling. Pertinent negatives include no fever or rash. He has tried NSAIDs for the symptoms.       Past Medical History:   Diagnosis Date    BPH (benign prostatic hyperplasia)     Hyperlipidemia     Thyroid disease        Past Surgical History:   Procedure Laterality Date    EYE SURGERY      HERNIA REPAIR         Social History     Socioeconomic History    Marital status:      Spouse name: Not on file    Number of children: Not on file    Years of education: Not on file    Highest education level: Bachelor's degree (e.g., BA, AB, BS)   Occupational History    Not on file   Tobacco Use    Smoking status: Former     Types: Cigarettes     Quit date: 1985     Years since quittin.3    Smokeless tobacco: Never   Vaping Use    Vaping Use: Never used   Substance and Sexual Activity    Alcohol use: No     Alcohol/week: 0.0 oz    Drug use: No    Sexual activity: Yes     Partners: Female   Other Topics Concern     Service Yes    Blood Transfusions No    Caffeine Concern No    Occupational Exposure Yes    Hobby Hazards Yes    Sleep Concern No    Stress Concern No    Weight Concern Yes    Special Diet No    Back Care No    Exercise Yes    Bike Helmet No    Seat Belt Yes    Self-Exams No   Social History Narrative    Not on file     Social Determinants of Health     Financial Resource Strain: Low Risk  (10/26/2022)     Overall Financial Resource Strain (CARDIA)     Difficulty of Paying Living Expenses: Not hard at all   Food Insecurity: No Food Insecurity (10/26/2022)    Hunger Vital Sign     Worried About Running Out of Food in the Last Year: Never true     Ran Out of Food in the Last Year: Never true   Transportation Needs: No Transportation Needs (10/26/2022)    PRAPARE - Transportation     Lack of Transportation (Medical): No     Lack of Transportation (Non-Medical): No   Physical Activity: Sufficiently Active (10/26/2022)    Exercise Vital Sign     Days of Exercise per Week: 7 days     Minutes of Exercise per Session: 40 min   Stress: No Stress Concern Present (10/26/2022)    [employer] - Occupational Stress Questionnaire     Feeling of Stress : Not at all   Social Connections: Moderately Isolated (10/26/2022)    Social Connection and Isolation Panel [NHANES]     Frequency of Communication with Friends and Family: More than three times a week     Frequency of Social Gatherings with Friends and Family: Once a week     Attends Shinto Services: Never     Active Member of Clubs or Organizations: No     Attends Club or Organization Meetings: Never     Marital Status:    Intimate Partner Violence: Not on file   Housing Stability: Low Risk  (10/26/2022)    Housing Stability Vital Sign     Unable to Pay for Housing in the Last Year: No     Number of Places Lived in the Last Year: 1     Unstable Housing in the Last Year: No        Family History   Problem Relation Age of Onset    Hypertension Mother     Other Father         ALS    Hypertension Brother     No Known Problems Son     No Known Problems Son     No Known Problems Son     No Known Problems Son     No Known Problems Maternal Grandmother     No Known Problems Maternal Grandfather     Heart Disease Paternal Grandmother     Hypertension Paternal Grandmother     No Known Problems Paternal Grandfather         No Known Allergies    Review of  "Systems   Constitutional:  Negative for fever.   Musculoskeletal:  Positive for joint pain and joint swelling.   Skin:  Negative for rash.   All other systems reviewed and are negative.       Objective:   /84   Pulse 72   Temp 37.1 °C (98.8 °F) (Temporal)   Resp 14   Ht 1.778 m (5' 10\")   Wt 107 kg (235 lb)   SpO2 92%   BMI 33.72 kg/m²     Physical Exam  Vitals reviewed.   Cardiovascular:      Rate and Rhythm: Normal rate.   Pulmonary:      Effort: Pulmonary effort is normal. No respiratory distress.   Musculoskeletal:         General: Swelling and tenderness present.   Feet:      Right foot:      Skin integrity: Skin integrity normal.      Comments: Generalized foot and ankle swelling. Erythema to dorsal foot, No skin breakdown. Tenderness to palpation of mid to proximal 4-5th metatarsal.   Skin:     General: Skin is warm and dry.      Capillary Refill: Capillary refill takes less than 2 seconds.      Findings: Erythema present.   Neurological:      Mental Status: He is alert and oriented to person, place, and time.   Psychiatric:         Behavior: Behavior normal.         Assessment/Plan:   1. Foot injury, right, initial encounter  - DX-FOOT-COMPLETE 3+ RIGHT; Future  - Referral to Podiatry  - naproxen (NAPROSYN) 375 MG Tab; Take 1 Tablet by mouth 2 times a day with meals for 14 days.  Dispense: 28 Tablet; Refill: 0    2. Cellulitis of right lower extremity  - cephALEXin (KEFLEX) 500 MG Cap; Take 1 Capsule by mouth 4 times a day for 7 days.  Dispense: 28 Capsule; Refill: 0    3. Arthritis of foot  - DX-FOOT-COMPLETE 3+ RIGHT; Future  - Referral to Podiatry  - naproxen (NAPROSYN) 375 MG Tab; Take 1 Tablet by mouth 2 times a day with meals for 14 days.  Dispense: 28 Tablet; Refill: 0    DX-FOOT-COMPLETE 3+ RIGHT    Result Date: 5/12/2023 5/12/2023 6:08 PM HISTORY/REASON FOR EXAM:  Pain/Deformity Following Trauma TECHNIQUE/EXAM DESCRIPTION AND NUMBER OF VIEWS: 3 views of the RIGHT foot. COMPARISON: " FINDINGS: The alignment is grossly normal. There is no evidence of displaced fracture or dislocation. There is no dorsal soft tissue swelling. There is severe arthritis affecting the first metatarsophalangeal joint. There is joint space narrowing with cortical irregularity large osteophytes. There is plantar calcaneal spurring.     Dorsal soft tissue swelling without evidence of fracture. Severe arthritis of the first metatarsophalangeal joint could be related to osteoarthritis or perhaps gout.    -RICE Therapy: Rest, Ice, Compression, Elevation  -Can also take tylenol as directed for pain.   -Ace wrap    Follow up with PCP. Follow up for persistent or worsening symptoms, fever, chills, signs of infection. Emergently for severe uncontrolled pain, neurovascular compromise (decreased sensation, motion, or circulation).    -Discussed concerns for secondary cellulitis, antibiotic coverage initiated.     Differential diagnosis, natural history, supportive care, and indications for immediate follow-up discussed.

## 2023-05-19 ASSESSMENT — ENCOUNTER SYMPTOMS
FEVER: 0
JOINT SWELLING: 1

## 2023-06-17 ENCOUNTER — OFFICE VISIT (OUTPATIENT)
Dept: URGENT CARE | Facility: CLINIC | Age: 78
End: 2023-06-17
Payer: MEDICARE

## 2023-06-17 VITALS
OXYGEN SATURATION: 94 % | TEMPERATURE: 98 F | BODY MASS INDEX: 32.2 KG/M2 | WEIGHT: 230 LBS | HEIGHT: 71 IN | SYSTOLIC BLOOD PRESSURE: 130 MMHG | RESPIRATION RATE: 16 BRPM | HEART RATE: 100 BPM | DIASTOLIC BLOOD PRESSURE: 80 MMHG

## 2023-06-17 DIAGNOSIS — K04.7 DENTAL ABSCESS: ICD-10-CM

## 2023-06-17 DIAGNOSIS — R22.0 RIGHT FACIAL SWELLING: ICD-10-CM

## 2023-06-17 PROCEDURE — 99213 OFFICE O/P EST LOW 20 MIN: CPT | Mod: 25 | Performed by: NURSE PRACTITIONER

## 2023-06-17 PROCEDURE — 3079F DIAST BP 80-89 MM HG: CPT | Performed by: NURSE PRACTITIONER

## 2023-06-17 PROCEDURE — 3075F SYST BP GE 130 - 139MM HG: CPT | Performed by: NURSE PRACTITIONER

## 2023-06-17 RX ORDER — AMOXICILLIN AND CLAVULANATE POTASSIUM 875; 125 MG/1; MG/1
1 TABLET, FILM COATED ORAL 2 TIMES DAILY
Qty: 14 TABLET | Refills: 0 | Status: SHIPPED | OUTPATIENT
Start: 2023-06-17 | End: 2023-06-24

## 2023-06-17 RX ORDER — DEXAMETHASONE SODIUM PHOSPHATE 10 MG/ML
10 INJECTION INTRAMUSCULAR; INTRAVENOUS ONCE
Status: COMPLETED | OUTPATIENT
Start: 2023-06-17 | End: 2023-06-17

## 2023-06-17 RX ADMIN — DEXAMETHASONE SODIUM PHOSPHATE 10 MG: 10 INJECTION INTRAMUSCULAR; INTRAVENOUS at 16:46

## 2023-06-17 ASSESSMENT — ENCOUNTER SYMPTOMS
CHILLS: 0
CONSTITUTIONAL NEGATIVE: 1
FEVER: 0
SINUS PAIN: 0

## 2023-06-17 ASSESSMENT — FIBROSIS 4 INDEX: FIB4 SCORE: 1.44

## 2023-06-17 ASSESSMENT — VISUAL ACUITY: OU: 1

## 2023-06-17 NOTE — PROGRESS NOTES
Subjective:     Price Moon is a 77 y.o. male who presents for Abscess (1 month, dental abscess on RT side, bursted today.)       Dental Problems   This is a new problem. Episode onset: 1 month ago. The problem has been gradually worsening. The pain is mild. Pertinent negatives include no fever. Associated symptoms comments: Right upper gum/facial swelling.     Went to dentistry 2 weeks ago, had x-rays, informed normal and advised to follow up with ENT. Has appointment in 1 week with ENT. Today, experienced purulent drainage coming from the right upper gums and swelling has decreased. Consulted his son who is an ER physician who recommends I&D, antibiotics, and steroids, Decadron specifically.    Review of Systems   Constitutional: Negative.  Negative for chills, fever and malaise/fatigue.   HENT:  Positive for dental problem. Negative for congestion and sinus pain.    All other systems reviewed and are negative.    Refer to HPI for additional details.    During this visit, appropriate PPE was worn, and hand hygiene was performed.    PMH:  has a past medical history of BPH (benign prostatic hyperplasia), Hyperlipidemia, and Thyroid disease.    MEDS:   Current Outpatient Medications:     amoxicillin-clavulanate (AUGMENTIN) 875-125 MG Tab, Take 1 Tablet by mouth 2 times a day for 7 days., Disp: 14 Tablet, Rfl: 0    bimatoprost (LUMIGAN) 0.01 % Solution, Place 1 Drop in both eyes every bedtime., Disp: , Rfl:     Current Facility-Administered Medications:     cefTRIAXone (ROCEPHIN) 500 mg, lidocaine (XYLOCAINE) 1 % 2 mL for IM use, 500 mg, Intramuscular, Once, Memo I. [place], A.P.R.N.    dexamethasone (DECADRON) injection (check route below) 10 mg, 10 mg, Intramuscular, Once, Memo I. [place], A.P.R.N.    ALLERGIES: No Known Allergies  SURGHX:   Past Surgical History:   Procedure Laterality Date    EYE SURGERY      HERNIA REPAIR       SOCHX:  reports that he quit smoking about 38 years ago. His  "smoking use included cigarettes. He has never used smokeless tobacco. He reports that he does not drink alcohol and does not use drugs.    FH: Per HPI as applicable/pertinent.      Objective:     /80   Pulse 100   Temp 36.7 °C (98 °F) (Temporal)   Resp 16   Ht 1.791 m (5' 10.5\")   Wt 104 kg (230 lb)   SpO2 94%   BMI 32.54 kg/m²     Physical Exam  Nursing note reviewed.   Constitutional:       General: He is not in acute distress.     Appearance: He is well-developed. He is not ill-appearing or toxic-appearing.   HENT:      Head:      Jaw: No trismus, tenderness or swelling.        Comments: Mild right facial swelling     Mouth/Throat:      Mouth: Mucous membranes are moist.      Dentition: Dental tenderness, gingival swelling and dental abscesses (Mild erythema, swelling, tenderness where indicated, no fluctuance, no active discharge) present.     Eyes:      General: Vision grossly intact.   Neck:      Trachea: Phonation normal.   Cardiovascular:      Rate and Rhythm: Normal rate.   Pulmonary:      Effort: Pulmonary effort is normal. No respiratory distress.   Musculoskeletal:         General: No deformity. Normal range of motion.   Skin:     General: Skin is warm and dry.      Coloration: Skin is not pale.   Neurological:      Mental Status: He is alert and oriented to person, place, and time.      Motor: No weakness.   Psychiatric:         Behavior: Behavior normal. Behavior is cooperative.       Assessment/Plan:     1. Dental abscess  - cefTRIAXone (ROCEPHIN) 500 mg, lidocaine (XYLOCAINE) 1 % 2 mL for IM use  - amoxicillin-clavulanate (AUGMENTIN) 875-125 MG Tab; Take 1 Tablet by mouth 2 times a day for 7 days.  Dispense: 14 Tablet; Refill: 0    2. Right facial swelling  - dexamethasone (DECADRON) injection (check route below) 10 mg    Rx as above. Monitor. OTC ibuprofen/APAP PRN for pain. Warm compress PRN. Advised if swelling reoccurs or symptoms worsen, patient will need to go to the ER for " incision and drainage. Follow up with ENT as scheduled.    Differential diagnosis, natural history, supportive care, over-the-counter symptom management per 's instructions, close monitoring, and indications for immediate follow-up discussed.     All questions answered. Patient agrees with the plan of care.    Discharge summary.

## 2023-06-19 ENCOUNTER — OFFICE VISIT (OUTPATIENT)
Dept: MEDICAL GROUP | Age: 78
End: 2023-06-19
Payer: MEDICARE

## 2023-06-19 VITALS
HEIGHT: 71 IN | TEMPERATURE: 96.7 F | BODY MASS INDEX: 34.19 KG/M2 | HEART RATE: 75 BPM | OXYGEN SATURATION: 94 % | DIASTOLIC BLOOD PRESSURE: 70 MMHG | WEIGHT: 244.2 LBS | SYSTOLIC BLOOD PRESSURE: 120 MMHG

## 2023-06-19 DIAGNOSIS — J32.9 SINUS ABSCESS: ICD-10-CM

## 2023-06-19 DIAGNOSIS — Z02.9 ADMINISTRATIVE ENCOUNTER: ICD-10-CM

## 2023-06-19 DIAGNOSIS — N40.1 BENIGN PROSTATIC HYPERPLASIA WITH LOWER URINARY TRACT SYMPTOMS, SYMPTOM DETAILS UNSPECIFIED: ICD-10-CM

## 2023-06-19 PROCEDURE — 3078F DIAST BP <80 MM HG: CPT | Performed by: FAMILY MEDICINE

## 2023-06-19 PROCEDURE — 3074F SYST BP LT 130 MM HG: CPT | Performed by: FAMILY MEDICINE

## 2023-06-19 PROCEDURE — 99214 OFFICE O/P EST MOD 30 MIN: CPT | Performed by: FAMILY MEDICINE

## 2023-06-19 RX ORDER — LEVOTHYROXINE SODIUM 0.1 MG/1
100 TABLET ORAL
COMMUNITY
End: 2023-06-22 | Stop reason: SDUPTHER

## 2023-06-19 ASSESSMENT — FIBROSIS 4 INDEX: FIB4 SCORE: 1.44

## 2023-06-19 NOTE — PROGRESS NOTES
"Subjective:   CC: Oral abscess    HPI:     Price Moon is a 77 y.o. male, established patient of the clinic.     Patient was recently seen by his dentist and urgent care for dental abscess of the right upper jaw.  He was prescribed Augmentin on June 17, 2023.    Yesterday, the abscess ruptured with purulent discharge.  Today, patient states that the pain and pressure sensation at the concern area are improving.  Right-sided facial swelling is also improving.  Patient has appointment  with Dr. Oviedo on 6/21/2023.  Patient needs referral.  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    He has chronic BPH that is controlled with over-the-counter for medication.  Patient is doing well.  Negative fever, chills, nausea, vomiting, dysphagia, dysphonia.    Current medicines (including changes today)  Current Outpatient Medications   Medication Sig Dispense Refill    Bioflavonoid Products (SUPER C-500 PO) Take  by mouth.      Multiple Vitamins-Minerals (MULTIVITAMIN MEN PO) Take  by mouth.      levothyroxine (SYNTHROID) 100 MCG Tab Take 100 mcg by mouth every morning on an empty stomach.      Zn-Pyg Afri-Nettle-Saw Palmet (SAW PALMETTO COMPLEX PO) Take 600 mcg by mouth.      amoxicillin-clavulanate (AUGMENTIN) 875-125 MG Tab Take 1 Tablet by mouth 2 times a day for 7 days. 14 Tablet 0    bimatoprost (LUMIGAN) 0.01 % Solution Place 1 Drop in both eyes every bedtime.       No current facility-administered medications for this visit.     He  has a past medical history of BPH (benign prostatic hyperplasia), Hyperlipidemia, and Thyroid disease.    I reviewed patient's problem list, allergies, medications, family hx, social hx with patient and update EPIC.        Objective:     /70 (BP Location: Right arm, Patient Position: Sitting, BP Cuff Size: Adult)   Pulse 75   Temp 35.9 °C (96.7 °F) (Temporal)   Ht 1.791 m (5' 10.5\")   Wt 111 kg (244 lb 3.2 oz)   SpO2 94%  Body " mass index is 34.54 kg/m².    Physical Exam:  Constitutional: awake, alert, in no distress.  Skin: Warm, dry, good turgor, no rashes, bruises, ulcers in visible areas.  Eye: conjunctiva clear, lids neg for edema or lesions.  ENMT: TM and auditory canals wnl. Oral and nasal mucosa wnl. Lips without lesions, good dentition, oropharynx clear.  -Ruptured dental abscess with purulent discharge noted at the right upper jaw.  Neck: Trachea midline, no masses, no thyromegaly. No cervical or supraclavicular lymphadenopathy  Respiratory: Unlabored respiratory effort, lungs clear to auscultation, no wheezes, no rales.  Cardiovascular: Normal S1, S2, no murmur, no pedal edema.  Neuro: CN2-12 grossly intact. Strength 5/5, reflexes 2/4 in all extremities, no sensory deficits.   Psych: Oriented x3, affect and mood wnl, intact judgement and insight.       Assessment and Plan:   The following treatment plan was discussed    1. Sinus abscess  History and exam are concerning for ruptured dental abscess.  Facial pain, swelling are improving after the rupture.  - Continue Augmentin  - Warm compresses  - Over-the-counter analgesic.  For pain  - Referral to ENT.  Patient had appointment with Dr. Yang on June 21, 2023.    2. Administrative encounter  -DMV renewal of  license approved    3. Benign prostatic hyperplasia with lower urinary tract symptoms, symptom details unspecified  Chronic, controlled with over-the-counter saw palmetto.  No side effect reported, will continue with    Shanda Lu M.D.      Followup: Return for As needed.    Please note that this dictation was created using voice recognition software. I have made every reasonable attempt to correct obvious errors, but I expect that there are errors of grammar and possibly content that I did not discover before finalizing the note.

## 2023-06-22 ENCOUNTER — PATIENT MESSAGE (OUTPATIENT)
Dept: MEDICAL GROUP | Age: 78
End: 2023-06-22
Payer: MEDICARE

## 2023-06-23 RX ORDER — LEVOTHYROXINE SODIUM 0.1 MG/1
100 TABLET ORAL
Qty: 90 TABLET | Refills: 3 | Status: SHIPPED | OUTPATIENT
Start: 2023-06-23

## 2023-10-23 ENCOUNTER — HOSPITAL ENCOUNTER (OUTPATIENT)
Dept: LAB | Facility: MEDICAL CENTER | Age: 78
End: 2023-10-23
Attending: FAMILY MEDICINE
Payer: MEDICARE

## 2023-10-23 DIAGNOSIS — E03.4 HYPOTHYROIDISM DUE TO ACQUIRED ATROPHY OF THYROID: ICD-10-CM

## 2023-10-23 DIAGNOSIS — E78.00 PURE HYPERCHOLESTEROLEMIA: ICD-10-CM

## 2023-10-23 LAB
ALBUMIN SERPL BCP-MCNC: 4.2 G/DL (ref 3.2–4.9)
ALBUMIN/GLOB SERPL: 1.2 G/DL
ALP SERPL-CCNC: 82 U/L (ref 30–99)
ALT SERPL-CCNC: 15 U/L (ref 2–50)
ANION GAP SERPL CALC-SCNC: 11 MMOL/L (ref 7–16)
AST SERPL-CCNC: 16 U/L (ref 12–45)
BASOPHILS # BLD AUTO: 0.9 % (ref 0–1.8)
BASOPHILS # BLD: 0.04 K/UL (ref 0–0.12)
BILIRUB SERPL-MCNC: 0.7 MG/DL (ref 0.1–1.5)
BUN SERPL-MCNC: 22 MG/DL (ref 8–22)
CALCIUM ALBUM COR SERPL-MCNC: 9.3 MG/DL (ref 8.5–10.5)
CALCIUM SERPL-MCNC: 9.5 MG/DL (ref 8.5–10.5)
CHLORIDE SERPL-SCNC: 102 MMOL/L (ref 96–112)
CHOLEST SERPL-MCNC: 193 MG/DL (ref 100–199)
CO2 SERPL-SCNC: 27 MMOL/L (ref 20–33)
CREAT SERPL-MCNC: 1.05 MG/DL (ref 0.5–1.4)
EOSINOPHIL # BLD AUTO: 0.03 K/UL (ref 0–0.51)
EOSINOPHIL NFR BLD: 0.7 % (ref 0–6.9)
ERYTHROCYTE [DISTWIDTH] IN BLOOD BY AUTOMATED COUNT: 47.2 FL (ref 35.9–50)
FASTING STATUS PATIENT QL REPORTED: NORMAL
GFR SERPLBLD CREATININE-BSD FMLA CKD-EPI: 73 ML/MIN/1.73 M 2
GLOBULIN SER CALC-MCNC: 3.6 G/DL (ref 1.9–3.5)
GLUCOSE SERPL-MCNC: 90 MG/DL (ref 65–99)
HCT VFR BLD AUTO: 48.2 % (ref 42–52)
HDLC SERPL-MCNC: 42 MG/DL
HGB BLD-MCNC: 15.8 G/DL (ref 14–18)
IMM GRANULOCYTES # BLD AUTO: 0.01 K/UL (ref 0–0.11)
IMM GRANULOCYTES NFR BLD AUTO: 0.2 % (ref 0–0.9)
LDLC SERPL CALC-MCNC: 128 MG/DL
LYMPHOCYTES # BLD AUTO: 1.57 K/UL (ref 1–4.8)
LYMPHOCYTES NFR BLD: 35.9 % (ref 22–41)
MCH RBC QN AUTO: 30.8 PG (ref 27–33)
MCHC RBC AUTO-ENTMCNC: 32.8 G/DL (ref 32.3–36.5)
MCV RBC AUTO: 94 FL (ref 81.4–97.8)
MONOCYTES # BLD AUTO: 0.37 K/UL (ref 0–0.85)
MONOCYTES NFR BLD AUTO: 8.5 % (ref 0–13.4)
NEUTROPHILS # BLD AUTO: 2.35 K/UL (ref 1.82–7.42)
NEUTROPHILS NFR BLD: 53.8 % (ref 44–72)
NRBC # BLD AUTO: 0 K/UL
NRBC BLD-RTO: 0 /100 WBC (ref 0–0.2)
PLATELET # BLD AUTO: 191 K/UL (ref 164–446)
PMV BLD AUTO: 9.5 FL (ref 9–12.9)
POTASSIUM SERPL-SCNC: 4.4 MMOL/L (ref 3.6–5.5)
PROT SERPL-MCNC: 7.8 G/DL (ref 6–8.2)
RBC # BLD AUTO: 5.13 M/UL (ref 4.7–6.1)
SODIUM SERPL-SCNC: 140 MMOL/L (ref 135–145)
TRIGL SERPL-MCNC: 116 MG/DL (ref 0–149)
TSH SERPL DL<=0.005 MIU/L-ACNC: 3.32 UIU/ML (ref 0.38–5.33)
WBC # BLD AUTO: 4.4 K/UL (ref 4.8–10.8)

## 2023-10-23 PROCEDURE — 84443 ASSAY THYROID STIM HORMONE: CPT

## 2023-10-23 PROCEDURE — 80053 COMPREHEN METABOLIC PANEL: CPT

## 2023-10-23 PROCEDURE — 80061 LIPID PANEL: CPT

## 2023-10-23 PROCEDURE — 85025 COMPLETE CBC W/AUTO DIFF WBC: CPT

## 2023-10-23 PROCEDURE — 36415 COLL VENOUS BLD VENIPUNCTURE: CPT

## 2023-10-29 SDOH — HEALTH STABILITY: PHYSICAL HEALTH: ON AVERAGE, HOW MANY MINUTES DO YOU ENGAGE IN EXERCISE AT THIS LEVEL?: 60 MIN

## 2023-10-29 SDOH — HEALTH STABILITY: PHYSICAL HEALTH: ON AVERAGE, HOW MANY DAYS PER WEEK DO YOU ENGAGE IN MODERATE TO STRENUOUS EXERCISE (LIKE A BRISK WALK)?: 6 DAYS

## 2023-10-29 SDOH — ECONOMIC STABILITY: FOOD INSECURITY: WITHIN THE PAST 12 MONTHS, YOU WORRIED THAT YOUR FOOD WOULD RUN OUT BEFORE YOU GOT MONEY TO BUY MORE.: NEVER TRUE

## 2023-10-29 SDOH — ECONOMIC STABILITY: FOOD INSECURITY: WITHIN THE PAST 12 MONTHS, THE FOOD YOU BOUGHT JUST DIDN'T LAST AND YOU DIDN'T HAVE MONEY TO GET MORE.: NEVER TRUE

## 2023-10-29 SDOH — ECONOMIC STABILITY: INCOME INSECURITY: IN THE LAST 12 MONTHS, WAS THERE A TIME WHEN YOU WERE NOT ABLE TO PAY THE MORTGAGE OR RENT ON TIME?: NO

## 2023-10-29 SDOH — ECONOMIC STABILITY: HOUSING INSECURITY: IN THE LAST 12 MONTHS, HOW MANY PLACES HAVE YOU LIVED?: 1

## 2023-10-29 ASSESSMENT — SOCIAL DETERMINANTS OF HEALTH (SDOH)
HOW MANY DRINKS CONTAINING ALCOHOL DO YOU HAVE ON A TYPICAL DAY WHEN YOU ARE DRINKING: PATIENT DOES NOT DRINK
HOW OFTEN DO YOU ATTEND CHURCH OR RELIGIOUS SERVICES?: PATIENT DECLINED
HOW OFTEN DO YOU HAVE SIX OR MORE DRINKS ON ONE OCCASION: NEVER
HOW OFTEN DO YOU ATTENT MEETINGS OF THE CLUB OR ORGANIZATION YOU BELONG TO?: NEVER
HOW OFTEN DO YOU ATTEND CHURCH OR RELIGIOUS SERVICES?: PATIENT DECLINED
IN A TYPICAL WEEK, HOW MANY TIMES DO YOU TALK ON THE PHONE WITH FAMILY, FRIENDS, OR NEIGHBORS?: MORE THAN THREE TIMES A WEEK
DO YOU BELONG TO ANY CLUBS OR ORGANIZATIONS SUCH AS CHURCH GROUPS UNIONS, FRATERNAL OR ATHLETIC GROUPS, OR SCHOOL GROUPS?: NO
DO YOU BELONG TO ANY CLUBS OR ORGANIZATIONS SUCH AS CHURCH GROUPS UNIONS, FRATERNAL OR ATHLETIC GROUPS, OR SCHOOL GROUPS?: NO
HOW OFTEN DO YOU GET TOGETHER WITH FRIENDS OR RELATIVES?: ONCE A WEEK
WITHIN THE PAST 12 MONTHS, YOU WORRIED THAT YOUR FOOD WOULD RUN OUT BEFORE YOU GOT THE MONEY TO BUY MORE: NEVER TRUE
HOW OFTEN DO YOU GET TOGETHER WITH FRIENDS OR RELATIVES?: ONCE A WEEK
IN A TYPICAL WEEK, HOW MANY TIMES DO YOU TALK ON THE PHONE WITH FAMILY, FRIENDS, OR NEIGHBORS?: MORE THAN THREE TIMES A WEEK
HOW OFTEN DO YOU ATTENT MEETINGS OF THE CLUB OR ORGANIZATION YOU BELONG TO?: NEVER

## 2023-10-29 ASSESSMENT — LIFESTYLE VARIABLES
HOW OFTEN DO YOU HAVE SIX OR MORE DRINKS ON ONE OCCASION: NEVER
HOW MANY STANDARD DRINKS CONTAINING ALCOHOL DO YOU HAVE ON A TYPICAL DAY: PATIENT DOES NOT DRINK

## 2023-10-30 ENCOUNTER — OFFICE VISIT (OUTPATIENT)
Dept: MEDICAL GROUP | Facility: MEDICAL CENTER | Age: 78
End: 2023-10-30
Payer: MEDICARE

## 2023-10-30 VITALS
DIASTOLIC BLOOD PRESSURE: 74 MMHG | TEMPERATURE: 97.4 F | WEIGHT: 245.04 LBS | HEIGHT: 70 IN | BODY MASS INDEX: 35.08 KG/M2 | SYSTOLIC BLOOD PRESSURE: 114 MMHG | OXYGEN SATURATION: 100 % | HEART RATE: 60 BPM

## 2023-10-30 DIAGNOSIS — M25.561 CHRONIC PAIN OF RIGHT KNEE: ICD-10-CM

## 2023-10-30 DIAGNOSIS — E66.9 OBESITY (BMI 30-39.9): ICD-10-CM

## 2023-10-30 DIAGNOSIS — G89.29 CHRONIC PAIN OF RIGHT KNEE: ICD-10-CM

## 2023-10-30 DIAGNOSIS — T46.6X5A MYALGIA DUE TO STATIN: ICD-10-CM

## 2023-10-30 DIAGNOSIS — M25.552 CHRONIC LEFT HIP PAIN: ICD-10-CM

## 2023-10-30 DIAGNOSIS — E03.4 HYPOTHYROIDISM DUE TO ACQUIRED ATROPHY OF THYROID: ICD-10-CM

## 2023-10-30 DIAGNOSIS — E78.00 PURE HYPERCHOLESTEROLEMIA: ICD-10-CM

## 2023-10-30 DIAGNOSIS — L30.9 FACIAL DERMATITIS: ICD-10-CM

## 2023-10-30 DIAGNOSIS — H61.21 IMPACTED CERUMEN OF RIGHT EAR: ICD-10-CM

## 2023-10-30 DIAGNOSIS — N40.1 BENIGN PROSTATIC HYPERPLASIA WITH LOWER URINARY TRACT SYMPTOMS, SYMPTOM DETAILS UNSPECIFIED: ICD-10-CM

## 2023-10-30 DIAGNOSIS — Z00.00 MEDICARE ANNUAL WELLNESS VISIT, SUBSEQUENT: Primary | ICD-10-CM

## 2023-10-30 DIAGNOSIS — M79.10 MYALGIA DUE TO STATIN: ICD-10-CM

## 2023-10-30 DIAGNOSIS — G89.29 CHRONIC LEFT HIP PAIN: ICD-10-CM

## 2023-10-30 PROCEDURE — 3074F SYST BP LT 130 MM HG: CPT | Performed by: FAMILY MEDICINE

## 2023-10-30 PROCEDURE — 3078F DIAST BP <80 MM HG: CPT | Performed by: FAMILY MEDICINE

## 2023-10-30 PROCEDURE — 99214 OFFICE O/P EST MOD 30 MIN: CPT | Mod: 25 | Performed by: FAMILY MEDICINE

## 2023-10-30 PROCEDURE — G0439 PPPS, SUBSEQ VISIT: HCPCS | Mod: 25 | Performed by: FAMILY MEDICINE

## 2023-10-30 PROCEDURE — 69210 REMOVE IMPACTED EAR WAX UNI: CPT | Performed by: FAMILY MEDICINE

## 2023-10-30 RX ORDER — ROSUVASTATIN CALCIUM 5 MG/1
5 TABLET, COATED ORAL EVERY EVENING
Qty: 90 TABLET | Refills: 3 | Status: SHIPPED | OUTPATIENT
Start: 2023-10-30

## 2023-10-30 RX ORDER — TRIAMCINOLONE ACETONIDE 1 MG/G
1 CREAM TOPICAL 2 TIMES DAILY
Qty: 45 G | Refills: 0 | Status: SHIPPED | OUTPATIENT
Start: 2023-10-30

## 2023-10-30 ASSESSMENT — FIBROSIS 4 INDEX: FIB4 SCORE: 1.69

## 2023-10-30 ASSESSMENT — ENCOUNTER SYMPTOMS: GENERAL WELL-BEING: GOOD

## 2023-10-30 ASSESSMENT — ACTIVITIES OF DAILY LIVING (ADL): BATHING_REQUIRES_ASSISTANCE: 0

## 2023-10-30 ASSESSMENT — PATIENT HEALTH QUESTIONNAIRE - PHQ9: CLINICAL INTERPRETATION OF PHQ2 SCORE: 0

## 2023-10-30 NOTE — PROGRESS NOTES
Chief Complaint   Patient presents with    Annual Exam     AWV       HPI:  Price Moon is a 78 y.o. here for Medicare Annual Wellness Visit     Patient takes all medications as directed.  He tolerates them well, no side effect reported.  He has chronic left hip pain secondary to osteoarthritis.  He complains of worsening left hip pain.he has history of left IT band and quad muscle tear in distant past that did not require surgical intervention.  Total hip replacement recommended by orthopedic surgeons, but patient is not interested.  Patient used to work with [facility] surgeons.  He tried physical therapy multiple times in the past without significant changes in his symptoms.  He is now ambulating with a cane for stability.   Patient has chronic mild hyperlipidemia.  He used to take atorvastatin in the past, but discontinued due to myalgia.  Recent labs show mild worsening hyperlipidemia.  Patient is interested in starting a different statin.  Patient is active and exercises regularly.  He has been using triamcinolone cream 0.1 mg as needed for a dry spot on the right nasolabial fold.  He requests refill of this medication.  He does not have symptoms today.    Patient Active Problem List    Diagnosis Date Noted    Myalgia due to statin 10/27/2022    Chronic pain of right knee 03/26/2021    Chronic left hip pain 04/25/2019    Obesity (BMI 30-39.9) 01/20/2017    Hypothyroidism due to acquired atrophy of thyroid 01/31/2016    BPH (benign prostatic hyperplasia) 01/20/2016    Pure hypercholesterolemia 01/20/2016       Current Outpatient Medications   Medication Sig Dispense Refill    rosuvastatin (CRESTOR) 5 MG Tab Take 1 Tablet by mouth every evening. 90 Tablet 3    triamcinolone acetonide (KENALOG) 0.1 % Cream Apply 1 Application topically 2 times a day. 45 g 0    levothyroxine (SYNTHROID) 100 MCG Tab Take 1 Tablet by mouth every morning on an empty stomach. 90 Tablet 3    Bioflavonoid Products (SUPER C-500 PO) Take   by mouth.      Multiple Vitamins-Minerals (MULTIVITAMIN MEN PO) Take  by mouth.      Zn-Pyg Afri-Nettle-Saw Palmet (SAW PALMETTO COMPLEX PO) Take 600 mcg by mouth.      bimatoprost (LUMIGAN) 0.01 % Solution Place 1 Drop in both eyes every bedtime.       No current facility-administered medications for this visit.          Current supplements as per medication list.     Allergies: Patient has no known allergies.    Current social contact/activities: 2 work out a week, swim in the afternoon, resistance training     He  reports that he quit smoking about 38 years ago. His smoking use included cigarettes. He has never used smokeless tobacco. He reports that he does not drink alcohol and does not use drugs.  Counseling given: Not Answered      ROS:    Gait: Uses a cane  Ostomy: No  Other tubes: No  Amputations: No  Chronic oxygen use: No  Last eye exam: 08/2023  Wears hearing aids: No   : Denies any urinary leakage during the last 6 months    Screening:  Depression Screening  Little interest or pleasure in doing things?  0 - not at all  Feeling down, depressed , or hopeless? 0 - not at all    Screening for Cognitive Impairment  Do you or any of your friends or family members have any concern about your memory? Yes  Three Minute Recall (Banana, Sunrise, Chair) 2/3    Jay clock face with all 12 numbers and set the hands to show 20 past 8.  @ is required. Please contact your  to configure this SmartLink.([email or web address],,,1:Yes:0:No)@    Cognitive concerns identified deferred for follow up unless specifically addressed in assessment and plan.    Fall Risk Assessment  Has the patient had two or more falls in the last year or any fall with injury in the last year?  No    Safety Assessment  Do you always wear your seatbelt?  Yes  Any changes to home needed to function safely? No  Difficulty hearing.  Yes  Patient counseled about all safety risks that were identified.    Functional Assessment ADLs  Are there any  barriers preventing you from cooking for yourself or meeting nutritional needs?  No.    Are there any barriers preventing you from driving safely or obtaining transportation?  No.    Are there any barriers preventing you from using a telephone or calling for help?  No    Are there any barriers preventing you from shopping?  No.    Are there any barriers preventing you from taking care of your own finances?  No    Are there any barriers preventing you from managing your medications?  No    Are there any barriers preventing you from showering, bathing or dressing yourself? No    Are there any barriers preventing you from doing housework or laundry? No    Are there any barriers preventing you from using the toilet?No    Are you currently engaging in any exercise or physical activity?  Yes.      Self-Assessment of Health  What is your perception of your health? Good    Do you sleep more than six hours a night? Yes    In the past 7 days, how much did pain keep you from doing your normal work? Some    Do you spend quality time with family or friends (virtually or in person)? Yes    Do you usually eat a heart healthy diet that constists of a variety of fruits, vegetables, whole grains and fiber? Yes    Do you eat foods high in fat and/or Fast Food more than three times per week? No    How concerned are you that your medical conditions are not being well managed? Not at all        Advance Care Planning  Do you have an Advance Directive, Living Will, Durable Power of , or POLST? Yes  Advance Directive Living Will Durable Power of  POLST is on file      Health Maintenance Summary            Annual Wellness Visit (Every 366 Days) Next due on 10/30/2024      10/30/2023  Visit Dx: Medicare annual wellness visit, subsequent    10/27/2022  Visit Dx: Medicare annual wellness visit, subsequent    10/27/2022  Level of Service: [place] ANNUAL WELLNESS VISIT-INCLUDES PPPS SUBSEQUE*    10/29/2020  Level of Service: [place] ANNUAL  WELLNESS VISIT-INCLUDES PPPS SUBSEQUE*    10/29/2020  Visit Dx: Medicare annual wellness visit, subsequent    Only the first 5 history entries have been loaded, but more history exists.              IMM DTaP/Tdap/Td Vaccine (2 - Td or Tdap) Next due on 1/13/2033 01/13/2023  Imm Admin: Tdap Vaccine    02/27/2013  Imm Admin: TD Vaccine              Pneumococcal Vaccine: 65+ Years (Series Information) Completed      01/20/2017  Imm Admin: Pneumococcal Conjugate Vaccine (Prevnar/PCV-13)    05/24/2012  Imm Admin: Pneumococcal polysaccharide vaccine (PPSV-23)              Hepatitis C Screening  Completed      04/20/2018  Hepatitis C Antibody component of HEP C VIRUS ANTIBODY              Zoster (Shingles) Vaccines (Series Information) Completed      07/01/2019  Imm Admin: Zoster Vaccine Recombinant (RZV) (SHINGRIX)    04/16/2019  Imm Admin: Zoster Vaccine Recombinant (RZV) (SHINGRIX)    08/06/2015  Imm Admin: Zoster Vaccine Live (ZVL) (Zostavax) - HISTORICAL DATA              COVID-19 Vaccine (Series Information) Completed      05/04/2023  Imm Admin: PFIZER BIVALENT SARS-COV-2 VACCINE (12+)    09/20/2022  Imm Admin: PFIZER BIVALENT SARS-COV-2 VACCINE (12+)    04/20/2022  Imm Admin: PFIZER PURPLE CAP SARS-COV-2 VACCINATION (12+)    10/23/2021  Imm Admin: PFIZER PURPLE CAP SARS-COV-2 VACCINATION (12+)    10/23/2021  Imm Admin: PFIZER PURPLE CAP SARS-COV-2 VACCINATION (12+)    Only the first 5 history entries have been loaded, but more history exists.              Influenza Vaccine (Series Information) Completed      10/18/2023  Imm Admin: Influenza Vaccine Adult HD    10/10/2022  Imm Admin: Influenza Vaccine Adult HD    10/09/2021  Imm Admin: Influenza Vaccine Adult HD    10/05/2020  Imm Admin: Influenza Vaccine Adult HD    09/01/2020  Imm Admin: Influenza Vaccine Adult HD    Only the first 5 history entries have been loaded, but more history exists.              Hepatitis A Vaccine (Hep A) (Series Information) Aged  Out      No completion history exists for this topic.              HPV Vaccines (Series Information) Aged Out      No completion history exists for this topic.              Polio Vaccine (Inactivated Polio) (Series Information) Aged Out      No completion history exists for this topic.              Meningococcal Immunization (Series Information) Aged Out      No completion history exists for this topic.              Discontinued - Colorectal Cancer Screening  Discontinued        Frequency changed to Never automatically (Topic No Longer Applies)    2019  REFERRAL TO GI FOR COLONOSCOPY    2014  Colonoscopy (Previously completed)              Discontinued - Hepatitis B Vaccine (Hep B)  Discontinued      No completion history exists for this topic.                    Patient Care Team:  Shanda Lu M.D. as PCP - General (Family Medicine)  Rosalva Kelly M.D. as Consulting Physician (Ophthalmology)  Verónica Oviedo M.D. as Consulting Physician (Otolaryngology)  Cody Taylor PT (Inactive) as Physical Therapist (Physical Therapist)      Social History     Tobacco Use    Smoking status: Former     Current packs/day: 0.00     Types: Cigarettes     Quit date: 1985     Years since quittin.8    Smokeless tobacco: Never   Vaping Use    Vaping Use: Never used   Substance Use Topics    Alcohol use: No     Alcohol/week: 0.0 oz    Drug use: No     Family History   Problem Relation Age of Onset    Hypertension Mother     Other Father         ALS    Hypertension Brother     No Known Problems Son     No Known Problems Son     No Known Problems Son     No Known Problems Son     No Known Problems Maternal Grandmother     No Known Problems Maternal Grandfather     Heart Disease Paternal Grandmother     Hypertension Paternal Grandmother     No Known Problems Paternal Grandfather      He  has a past medical history of BPH (benign prostatic hyperplasia), Hyperlipidemia, and Thyroid disease.   Past Surgical  "History:   Procedure Laterality Date    EYE SURGERY      HERNIA REPAIR         Exam:   /74 (BP Location: Left arm, Patient Position: Sitting, BP Cuff Size: Adult)   Pulse 60   Temp 36.3 °C (97.4 °F) (Temporal)   Ht 1.765 m (5' 9.5\")   Wt 111 kg (245 lb 0.7 oz)   SpO2 100%  Body mass index is 35.67 kg/m².    Hearing excellent.    Dentition good  Alert, oriented in no acute distress.  Eye contact is good, speech goal directed, affect calm  Cardiopulmonary exam: within normal limits   Moderate cerumen impaction at the right ear    Assessment and Plan. The following treatment and monitoring plan is recommended:      1. Obesity (BMI 30-39.9)  - Patient identified as having weight management issue.  Appropriate orders and counseling given.     2. Hypothyroidism due to acquired atrophy of thyroid  Chronic, controlled with levothyroxine 100 mcg daily, no s/e reported, will continue.    - TSH WITH REFLEX TO FT4; Future    3. Chronic pain of right knee  Chronic, not bothersome currently.  Symptoms are controlled with regular exercises and over-the-counter analgesics.  For pain.    4. Benign prostatic hyperplasia with lower urinary tract symptoms, symptom details unspecified  Mild, controlled with saw palmetto as needed.  He had 2 negative prostate biopsy in the past.  We will continue to monitor.    5. Medicare annual wellness visit, subsequent  Labs per orders  Immunization reviewed and discussed  Patient was counseled about  diet, supplements, exercises.   Preventive cares reviewed, discussed, and updated as appropriate.       6. Chronic left hip pain  Chronic, secondary to severe osteoarthritis of the left hip.  Patient is working with [facility].  Total hip replacement recommended in the past, but patient declined.  He tried physical therapy multiple times in the past without success.  He is now ambulating with a cane for stability.  He declined physical therapy.  He is active and try to exercise regularly as " tolerated.  -Weight loss  -Continue regular exercises  -Continue rehab exercises  -Over-the-counter analgesics as needed for pain    7. Facial dermatitis  - triamcinolone acetonide (KENALOG) 0.1 % Cream; Apply 1 Application topically 2 times a day.  Dispense: 45 g; Refill: 0    8. Impacted cerumen of right ear  Exam was notable for moderate cerumen impaction at the right ear  -Curettage performed    9. Pure hypercholesterolemia  10. Myalgia due to statin  Chronic, not controlled.  Patient used to take atorvastatin which was discontinued due to myalgia.  He wishes to try another medication for his condition.  - rosuvastatin (CRESTOR) 5 MG Tab; Take 1 Tablet by mouth every evening.  Dispense: 90 Tablet; Refill: 3  - CBC WITH DIFFERENTIAL; Future  - Comp Metabolic Panel; Future  - Lipid Profile; Future      I personally removed ear wax from the right ear using a lighted curette. Patient tolerated the procedure well, no immediate complication noted.        Services suggested: No services needed at this time  Health Care Screening: Age-appropriate preventive services recommended by USPTF and ACIP covered by Medicare were discussed today. Services ordered if indicated and agreed upon by the patient.  Referrals offered: Community-based lifestyle interventions to reduce health risks and promote self-management and wellness, fall prevention, nutrition, physical activity, tobacco-use cessation, weight loss, and mental health services as per orders if indicated.    Discussion today about general wellness and lifestyle habits:    Prevent falls and reduce trip hazards; Cautioned about securing or removing rugs.  Have a working fire alarm and carbon monoxide detector;   Engage in regular physical activity and social activities     Follow-up: Return in about 1 year (around 10/30/2024) for Annual wellness visit.

## 2023-11-29 ENCOUNTER — PATIENT MESSAGE (OUTPATIENT)
Dept: HEALTH INFORMATION MANAGEMENT | Facility: OTHER | Age: 78
End: 2023-11-29

## 2024-01-19 ENCOUNTER — APPOINTMENT (RX ONLY)
Dept: URBAN - METROPOLITAN AREA CLINIC 15 | Facility: CLINIC | Age: 79
Setting detail: DERMATOLOGY
End: 2024-01-19

## 2024-01-19 DIAGNOSIS — D18.0 HEMANGIOMA: ICD-10-CM

## 2024-01-19 DIAGNOSIS — L82.1 OTHER SEBORRHEIC KERATOSIS: ICD-10-CM

## 2024-01-19 DIAGNOSIS — Z85.820 PERSONAL HISTORY OF MALIGNANT MELANOMA OF SKIN: ICD-10-CM

## 2024-01-19 DIAGNOSIS — Z71.89 OTHER SPECIFIED COUNSELING: ICD-10-CM

## 2024-01-19 DIAGNOSIS — D22 MELANOCYTIC NEVI: ICD-10-CM

## 2024-01-19 DIAGNOSIS — L81.4 OTHER MELANIN HYPERPIGMENTATION: ICD-10-CM

## 2024-01-19 DIAGNOSIS — L57.0 ACTINIC KERATOSIS: ICD-10-CM

## 2024-01-19 PROBLEM — D22.62 MELANOCYTIC NEVI OF LEFT UPPER LIMB, INCLUDING SHOULDER: Status: ACTIVE | Noted: 2024-01-19

## 2024-01-19 PROBLEM — D22.61 MELANOCYTIC NEVI OF RIGHT UPPER LIMB, INCLUDING SHOULDER: Status: ACTIVE | Noted: 2024-01-19

## 2024-01-19 PROBLEM — D22.5 MELANOCYTIC NEVI OF TRUNK: Status: ACTIVE | Noted: 2024-01-19

## 2024-01-19 PROBLEM — D22.71 MELANOCYTIC NEVI OF RIGHT LOWER LIMB, INCLUDING HIP: Status: ACTIVE | Noted: 2024-01-19

## 2024-01-19 PROBLEM — D22.72 MELANOCYTIC NEVI OF LEFT LOWER LIMB, INCLUDING HIP: Status: ACTIVE | Noted: 2024-01-19

## 2024-01-19 PROBLEM — D18.01 HEMANGIOMA OF SKIN AND SUBCUTANEOUS TISSUE: Status: ACTIVE | Noted: 2024-01-19

## 2024-01-19 PROCEDURE — 99213 OFFICE O/P EST LOW 20 MIN: CPT | Mod: 25

## 2024-01-19 PROCEDURE — 17000 DESTRUCT PREMALG LESION: CPT

## 2024-01-19 PROCEDURE — 17003 DESTRUCT PREMALG LES 2-14: CPT

## 2024-01-19 PROCEDURE — ? OBSERVATION

## 2024-01-19 PROCEDURE — ? COUNSELING

## 2024-01-19 PROCEDURE — ? LIQUID NITROGEN

## 2024-01-19 ASSESSMENT — LOCATION DETAILED DESCRIPTION DERM
LOCATION DETAILED: RIGHT SUPERIOR HELIX
LOCATION DETAILED: LEFT ANTERIOR DISTAL UPPER ARM
LOCATION DETAILED: LEFT SUPERIOR MEDIAL MIDBACK
LOCATION DETAILED: LEFT SCAPHA
LOCATION DETAILED: RIGHT MEDIAL UPPER BACK
LOCATION DETAILED: LEFT POPLITEAL SKIN
LOCATION DETAILED: LEFT LATERAL FOREHEAD
LOCATION DETAILED: NASAL DORSUM
LOCATION DETAILED: RIGHT MEDIAL INFERIOR CHEST
LOCATION DETAILED: LEFT ANTECUBITAL SKIN
LOCATION DETAILED: RIGHT ANTERIOR DISTAL UPPER ARM
LOCATION DETAILED: RIGHT POPLITEAL SKIN
LOCATION DETAILED: LEFT MEDIAL SUPERIOR CHEST
LOCATION DETAILED: LEFT VENTRAL DISTAL FOREARM
LOCATION DETAILED: RIGHT SUPERIOR PARIETAL SCALP
LOCATION DETAILED: RIGHT FOREHEAD
LOCATION DETAILED: RIGHT MEDIAL TEMPLE
LOCATION DETAILED: RIGHT DISTAL POSTERIOR THIGH
LOCATION DETAILED: RIGHT INFERIOR MEDIAL MIDBACK
LOCATION DETAILED: LEFT MEDIAL FOREHEAD
LOCATION DETAILED: EPIGASTRIC SKIN
LOCATION DETAILED: RIGHT RADIAL DORSAL HAND
LOCATION DETAILED: LEFT VENTRAL DISTAL FOREARM
LOCATION DETAILED: LEFT INFERIOR FOREHEAD
LOCATION DETAILED: RIGHT PROXIMAL CALF
LOCATION DETAILED: INFERIOR THORACIC SPINE
LOCATION DETAILED: RIGHT VENTRAL PROXIMAL FOREARM
LOCATION DETAILED: RIGHT VENTRAL DISTAL FOREARM
LOCATION DETAILED: RIGHT INFERIOR UPPER BACK
LOCATION DETAILED: LEFT RADIAL DORSAL HAND
LOCATION DETAILED: LEFT DISTAL POSTERIOR THIGH
LOCATION DETAILED: PERIUMBILICAL SKIN
LOCATION DETAILED: RIGHT ANTERIOR PROXIMAL UPPER ARM
LOCATION DETAILED: RIGHT INFERIOR LATERAL FOREHEAD
LOCATION DETAILED: MID-OCCIPITAL SCALP
LOCATION DETAILED: XIPHOID
LOCATION DETAILED: LEFT PROXIMAL CALF

## 2024-01-19 ASSESSMENT — LOCATION SIMPLE DESCRIPTION DERM
LOCATION SIMPLE: RIGHT TEMPLE
LOCATION SIMPLE: RIGHT UPPER BACK
LOCATION SIMPLE: CHEST
LOCATION SIMPLE: LEFT FOREARM
LOCATION SIMPLE: SCALP
LOCATION SIMPLE: RIGHT LOWER BACK
LOCATION SIMPLE: LEFT CALF
LOCATION SIMPLE: RIGHT POSTERIOR THIGH
LOCATION SIMPLE: RIGHT EAR
LOCATION SIMPLE: POSTERIOR SCALP
LOCATION SIMPLE: RIGHT HAND
LOCATION SIMPLE: RIGHT FOREHEAD
LOCATION SIMPLE: RIGHT POPLITEAL SKIN
LOCATION SIMPLE: LEFT ELBOW
LOCATION SIMPLE: UPPER BACK
LOCATION SIMPLE: LEFT LOWER BACK
LOCATION SIMPLE: LEFT FOREARM
LOCATION SIMPLE: LEFT POSTERIOR THIGH
LOCATION SIMPLE: LEFT UPPER ARM
LOCATION SIMPLE: LEFT FOREHEAD
LOCATION SIMPLE: RIGHT FOREARM
LOCATION SIMPLE: LEFT HAND
LOCATION SIMPLE: ABDOMEN
LOCATION SIMPLE: LEFT POPLITEAL SKIN
LOCATION SIMPLE: LEFT EAR
LOCATION SIMPLE: NOSE
LOCATION SIMPLE: RIGHT UPPER ARM
LOCATION SIMPLE: RIGHT CALF

## 2024-01-19 ASSESSMENT — LOCATION ZONE DERM
LOCATION ZONE: TRUNK
LOCATION ZONE: SCALP
LOCATION ZONE: ARM
LOCATION ZONE: ARM
LOCATION ZONE: LEG
LOCATION ZONE: FACE
LOCATION ZONE: NOSE
LOCATION ZONE: EAR
LOCATION ZONE: HAND

## 2024-01-19 NOTE — PROCEDURE: COUNSELING
Quality 137: Melanoma: Continuity Of Care - Recall System: Patient information entered into a recall system that includes: target date for the next exam specified AND a process to follow up with patients regarding missed or unscheduled appointments
When Should The Patient Follow-Up For Their Next Full-Body Skin Exam?: 6 Months
Detail Level: Zone

## 2024-08-13 RX ORDER — LEVOTHYROXINE SODIUM 100 UG/1
100 TABLET ORAL
Qty: 90 TABLET | Refills: 3 | Status: SHIPPED | OUTPATIENT
Start: 2024-08-13

## 2024-10-11 ENCOUNTER — HOSPITAL ENCOUNTER (INPATIENT)
Facility: MEDICAL CENTER | Age: 79
LOS: 2 days | DRG: 322 | End: 2024-10-13
Attending: INTERNAL MEDICINE | Admitting: STUDENT IN AN ORGANIZED HEALTH CARE EDUCATION/TRAINING PROGRAM
Payer: MEDICARE

## 2024-10-11 DIAGNOSIS — I21.4 NSTEMI (NON-ST ELEVATED MYOCARDIAL INFARCTION) (HCC): ICD-10-CM

## 2024-10-11 LAB
ALBUMIN SERPL BCP-MCNC: 4 G/DL (ref 3.2–4.9)
ALBUMIN/GLOB SERPL: 1.1 G/DL
ALP SERPL-CCNC: 100 U/L (ref 30–99)
ALT SERPL-CCNC: 22 U/L (ref 2–50)
ANION GAP SERPL CALC-SCNC: 14 MMOL/L (ref 7–16)
APTT PPP: 30.4 SEC (ref 24.7–36)
AST SERPL-CCNC: 24 U/L (ref 12–45)
BASOPHILS # BLD AUTO: 0.7 % (ref 0–1.8)
BASOPHILS # BLD: 0.04 K/UL (ref 0–0.12)
BILIRUB SERPL-MCNC: 0.5 MG/DL (ref 0.1–1.5)
BUN SERPL-MCNC: 23 MG/DL (ref 8–22)
CALCIUM ALBUM COR SERPL-MCNC: 9.6 MG/DL (ref 8.5–10.5)
CALCIUM SERPL-MCNC: 9.6 MG/DL (ref 8.5–10.5)
CHLORIDE SERPL-SCNC: 100 MMOL/L (ref 96–112)
CO2 SERPL-SCNC: 24 MMOL/L (ref 20–33)
CREAT SERPL-MCNC: 1.09 MG/DL (ref 0.5–1.4)
EOSINOPHIL # BLD AUTO: 0.05 K/UL (ref 0–0.51)
EOSINOPHIL NFR BLD: 0.8 % (ref 0–6.9)
ERYTHROCYTE [DISTWIDTH] IN BLOOD BY AUTOMATED COUNT: 44.4 FL (ref 35.9–50)
EST. AVERAGE GLUCOSE BLD GHB EST-MCNC: 123 MG/DL
GFR SERPLBLD CREATININE-BSD FMLA CKD-EPI: 69 ML/MIN/1.73 M 2
GLOBULIN SER CALC-MCNC: 3.8 G/DL (ref 1.9–3.5)
GLUCOSE SERPL-MCNC: 97 MG/DL (ref 65–99)
HBA1C MFR BLD: 5.9 % (ref 4–5.6)
HCT VFR BLD AUTO: 48.3 % (ref 42–52)
HGB BLD-MCNC: 15.9 G/DL (ref 14–18)
IMM GRANULOCYTES # BLD AUTO: 0.02 K/UL (ref 0–0.11)
IMM GRANULOCYTES NFR BLD AUTO: 0.3 % (ref 0–0.9)
INR PPP: 1.02 (ref 0.87–1.13)
LYMPHOCYTES # BLD AUTO: 2.11 K/UL (ref 1–4.8)
LYMPHOCYTES NFR BLD: 35.1 % (ref 22–41)
MAGNESIUM SERPL-MCNC: 2 MG/DL (ref 1.5–2.5)
MCH RBC QN AUTO: 30.1 PG (ref 27–33)
MCHC RBC AUTO-ENTMCNC: 32.9 G/DL (ref 32.3–36.5)
MCV RBC AUTO: 91.5 FL (ref 81.4–97.8)
MONOCYTES # BLD AUTO: 0.44 K/UL (ref 0–0.85)
MONOCYTES NFR BLD AUTO: 7.3 % (ref 0–13.4)
NEUTROPHILS # BLD AUTO: 3.35 K/UL (ref 1.82–7.42)
NEUTROPHILS NFR BLD: 55.8 % (ref 44–72)
NRBC # BLD AUTO: 0 K/UL
NRBC BLD-RTO: 0 /100 WBC (ref 0–0.2)
PLATELET # BLD AUTO: 209 K/UL (ref 164–446)
PMV BLD AUTO: 9.1 FL (ref 9–12.9)
POTASSIUM SERPL-SCNC: 4.6 MMOL/L (ref 3.6–5.5)
PROT SERPL-MCNC: 7.8 G/DL (ref 6–8.2)
PROTHROMBIN TIME: 13.4 SEC (ref 12–14.6)
RBC # BLD AUTO: 5.28 M/UL (ref 4.7–6.1)
SODIUM SERPL-SCNC: 138 MMOL/L (ref 135–145)
TROPONIN T SERPL-MCNC: 107 NG/L (ref 6–19)
UFH PPP CHRO-ACNC: <0.1 IU/ML
WBC # BLD AUTO: 6 K/UL (ref 4.8–10.8)

## 2024-10-11 PROCEDURE — 85520 HEPARIN ASSAY: CPT

## 2024-10-11 PROCEDURE — A9270 NON-COVERED ITEM OR SERVICE: HCPCS | Performed by: STUDENT IN AN ORGANIZED HEALTH CARE EDUCATION/TRAINING PROGRAM

## 2024-10-11 PROCEDURE — 80053 COMPREHEN METABOLIC PANEL: CPT

## 2024-10-11 PROCEDURE — 36415 COLL VENOUS BLD VENIPUNCTURE: CPT

## 2024-10-11 PROCEDURE — 99222 1ST HOSP IP/OBS MODERATE 55: CPT | Performed by: INTERNAL MEDICINE

## 2024-10-11 PROCEDURE — 700102 HCHG RX REV CODE 250 W/ 637 OVERRIDE(OP): Performed by: STUDENT IN AN ORGANIZED HEALTH CARE EDUCATION/TRAINING PROGRAM

## 2024-10-11 PROCEDURE — 85025 COMPLETE CBC W/AUTO DIFF WBC: CPT

## 2024-10-11 PROCEDURE — 93005 ELECTROCARDIOGRAM TRACING: CPT | Performed by: STUDENT IN AN ORGANIZED HEALTH CARE EDUCATION/TRAINING PROGRAM

## 2024-10-11 PROCEDURE — 85610 PROTHROMBIN TIME: CPT

## 2024-10-11 PROCEDURE — 83036 HEMOGLOBIN GLYCOSYLATED A1C: CPT

## 2024-10-11 PROCEDURE — 83735 ASSAY OF MAGNESIUM: CPT

## 2024-10-11 PROCEDURE — 700102 HCHG RX REV CODE 250 W/ 637 OVERRIDE(OP): Performed by: INTERNAL MEDICINE

## 2024-10-11 PROCEDURE — A9270 NON-COVERED ITEM OR SERVICE: HCPCS | Performed by: INTERNAL MEDICINE

## 2024-10-11 PROCEDURE — 84484 ASSAY OF TROPONIN QUANT: CPT

## 2024-10-11 PROCEDURE — 85730 THROMBOPLASTIN TIME PARTIAL: CPT

## 2024-10-11 PROCEDURE — 99291 CRITICAL CARE FIRST HOUR: CPT | Performed by: STUDENT IN AN ORGANIZED HEALTH CARE EDUCATION/TRAINING PROGRAM

## 2024-10-11 PROCEDURE — 700111 HCHG RX REV CODE 636 W/ 250 OVERRIDE (IP): Mod: JZ | Performed by: STUDENT IN AN ORGANIZED HEALTH CARE EDUCATION/TRAINING PROGRAM

## 2024-10-11 PROCEDURE — 770020 HCHG ROOM/CARE - TELE (206)

## 2024-10-11 RX ORDER — LEVOTHYROXINE SODIUM 100 UG/1
100 TABLET ORAL
Status: DISCONTINUED | OUTPATIENT
Start: 2024-10-12 | End: 2024-10-13 | Stop reason: HOSPADM

## 2024-10-11 RX ORDER — LOSARTAN POTASSIUM 25 MG/1
25 TABLET ORAL
Status: DISCONTINUED | OUTPATIENT
Start: 2024-10-12 | End: 2024-10-13 | Stop reason: HOSPADM

## 2024-10-11 RX ORDER — HEPARIN SODIUM 1000 [USP'U]/ML
2000 INJECTION, SOLUTION INTRAVENOUS; SUBCUTANEOUS PRN
Status: DISCONTINUED | OUTPATIENT
Start: 2024-10-11 | End: 2024-10-12

## 2024-10-11 RX ORDER — ASPIRIN 81 MG/1
81 TABLET ORAL DAILY
Status: DISCONTINUED | OUTPATIENT
Start: 2024-10-12 | End: 2024-10-12

## 2024-10-11 RX ORDER — LABETALOL HYDROCHLORIDE 5 MG/ML
10 INJECTION, SOLUTION INTRAVENOUS EVERY 4 HOURS PRN
Status: DISCONTINUED | OUTPATIENT
Start: 2024-10-11 | End: 2024-10-13 | Stop reason: HOSPADM

## 2024-10-11 RX ORDER — ACETAMINOPHEN 325 MG/1
650 TABLET ORAL EVERY 6 HOURS PRN
Status: DISCONTINUED | OUTPATIENT
Start: 2024-10-11 | End: 2024-10-13 | Stop reason: HOSPADM

## 2024-10-11 RX ORDER — HEPARIN SODIUM 5000 [USP'U]/100ML
0-30 INJECTION, SOLUTION INTRAVENOUS CONTINUOUS
Status: DISCONTINUED | OUTPATIENT
Start: 2024-10-11 | End: 2024-10-12

## 2024-10-11 RX ORDER — ROSUVASTATIN CALCIUM 20 MG/1
40 TABLET, COATED ORAL EVERY EVENING
Status: DISCONTINUED | OUTPATIENT
Start: 2024-10-11 | End: 2024-10-12

## 2024-10-11 RX ORDER — HEPARIN SODIUM 1000 [USP'U]/ML
4000 INJECTION, SOLUTION INTRAVENOUS; SUBCUTANEOUS ONCE
Status: DISCONTINUED | OUTPATIENT
Start: 2024-10-11 | End: 2024-10-11

## 2024-10-11 RX ORDER — ONDANSETRON 4 MG/1
4 TABLET, ORALLY DISINTEGRATING ORAL EVERY 4 HOURS PRN
Status: DISCONTINUED | OUTPATIENT
Start: 2024-10-11 | End: 2024-10-13 | Stop reason: HOSPADM

## 2024-10-11 RX ORDER — ONDANSETRON 2 MG/ML
4 INJECTION INTRAMUSCULAR; INTRAVENOUS EVERY 4 HOURS PRN
Status: DISCONTINUED | OUTPATIENT
Start: 2024-10-11 | End: 2024-10-13 | Stop reason: HOSPADM

## 2024-10-11 RX ORDER — METOPROLOL TARTRATE 25 MG/1
12.5 TABLET, FILM COATED ORAL 2 TIMES DAILY
Status: DISCONTINUED | OUTPATIENT
Start: 2024-10-11 | End: 2024-10-13 | Stop reason: HOSPADM

## 2024-10-11 RX ADMIN — METOPROLOL TARTRATE 12.5 MG: 25 TABLET, FILM COATED ORAL at 23:46

## 2024-10-11 RX ADMIN — ROSUVASTATIN CALCIUM 40 MG: 20 TABLET, FILM COATED ORAL at 21:50

## 2024-10-11 RX ADMIN — NITROGLYCERIN 1 INCH: 20 OINTMENT TOPICAL at 23:44

## 2024-10-11 RX ADMIN — HEPARIN SODIUM 12 UNITS/KG/HR: 5000 INJECTION, SOLUTION INTRAVENOUS at 21:55

## 2024-10-11 ASSESSMENT — COGNITIVE AND FUNCTIONAL STATUS - GENERAL
DAILY ACTIVITIY SCORE: 24
SUGGESTED CMS G CODE MODIFIER DAILY ACTIVITY: CH
MOBILITY SCORE: 24
SUGGESTED CMS G CODE MODIFIER MOBILITY: CH

## 2024-10-11 ASSESSMENT — LIFESTYLE VARIABLES
DOES PATIENT WANT TO STOP DRINKING: NO
TOTAL SCORE: 0
TOTAL SCORE: 0
EVER FELT BAD OR GUILTY ABOUT YOUR DRINKING: NO
ON A TYPICAL DAY WHEN YOU DRINK ALCOHOL HOW MANY DRINKS DO YOU HAVE: 0
ALCOHOL_USE: NO
HOW MANY TIMES IN THE PAST YEAR HAVE YOU HAD 5 OR MORE DRINKS IN A DAY: 0
EVER HAD A DRINK FIRST THING IN THE MORNING TO STEADY YOUR NERVES TO GET RID OF A HANGOVER: NO
CONSUMPTION TOTAL: NEGATIVE
AVERAGE NUMBER OF DAYS PER WEEK YOU HAVE A DRINK CONTAINING ALCOHOL: 0
HAVE YOU EVER FELT YOU SHOULD CUT DOWN ON YOUR DRINKING: NO
HAVE PEOPLE ANNOYED YOU BY CRITICIZING YOUR DRINKING: NO
TOTAL SCORE: 0

## 2024-10-11 ASSESSMENT — SOCIAL DETERMINANTS OF HEALTH (SDOH)
WITHIN THE PAST 12 MONTHS, YOU WORRIED THAT YOUR FOOD WOULD RUN OUT BEFORE YOU GOT THE MONEY TO BUY MORE: NEVER TRUE
WITHIN THE LAST YEAR, HAVE YOU BEEN KICKED, HIT, SLAPPED, OR OTHERWISE PHYSICALLY HURT BY YOUR PARTNER OR EX-PARTNER?: NO
WITHIN THE LAST YEAR, HAVE YOU BEEN AFRAID OF YOUR PARTNER OR EX-PARTNER?: NO
WITHIN THE LAST YEAR, HAVE YOU BEEN HUMILIATED OR EMOTIONALLY ABUSED IN OTHER WAYS BY YOUR PARTNER OR EX-PARTNER?: NO
WITHIN THE LAST YEAR, HAVE TO BEEN RAPED OR FORCED TO HAVE ANY KIND OF SEXUAL ACTIVITY BY YOUR PARTNER OR EX-PARTNER?: NO
IN THE PAST 12 MONTHS, HAS THE ELECTRIC, GAS, OIL, OR WATER COMPANY THREATENED TO SHUT OFF SERVICE IN YOUR HOME?: NO
WITHIN THE PAST 12 MONTHS, THE FOOD YOU BOUGHT JUST DIDN'T LAST AND YOU DIDN'T HAVE MONEY TO GET MORE: NEVER TRUE

## 2024-10-11 ASSESSMENT — PATIENT HEALTH QUESTIONNAIRE - PHQ9
1. LITTLE INTEREST OR PLEASURE IN DOING THINGS: NOT AT ALL
2. FEELING DOWN, DEPRESSED, IRRITABLE, OR HOPELESS: NOT AT ALL
SUM OF ALL RESPONSES TO PHQ9 QUESTIONS 1 AND 2: 0

## 2024-10-11 ASSESSMENT — ENCOUNTER SYMPTOMS
SHORTNESS OF BREATH: 1
NAUSEA: 0
BLOOD IN STOOL: 0
ABDOMINAL PAIN: 0
HEMOPTYSIS: 0
COUGH: 0
FEVER: 0
DIZZINESS: 0
DIARRHEA: 0
EYE PAIN: 0
PALPITATIONS: 0
BLURRED VISION: 0
VOMITING: 0
HEMATOCHEZIA: 0
BACK PAIN: 0
CONSTIPATION: 0
DIAPHORESIS: 0
WHEEZING: 0
CHILLS: 0
HEADACHES: 0
DOUBLE VISION: 0
NECK PAIN: 0

## 2024-10-11 ASSESSMENT — PAIN DESCRIPTION - PAIN TYPE: TYPE: ACUTE PAIN

## 2024-10-11 ASSESSMENT — FIBROSIS 4 INDEX: FIB4 SCORE: 1.71

## 2024-10-12 ENCOUNTER — APPOINTMENT (OUTPATIENT)
Dept: CARDIOLOGY | Facility: MEDICAL CENTER | Age: 79
DRG: 322 | End: 2024-10-12
Attending: INTERNAL MEDICINE
Payer: MEDICARE

## 2024-10-12 LAB
ACT BLD: 153 SEC (ref 74–137)
ACT BLD: 238 SEC (ref 74–137)
ACT BLD: 257 SEC (ref 74–137)
CHOLEST SERPL-MCNC: 119 MG/DL (ref 100–199)
EKG IMPRESSION: NORMAL
EKG IMPRESSION: NORMAL
HDLC SERPL-MCNC: 46 MG/DL
LDLC SERPL CALC-MCNC: 48 MG/DL
TRIGL SERPL-MCNC: 125 MG/DL (ref 0–149)
TROPONIN T SERPL-MCNC: 77 NG/L (ref 6–19)
UFH PPP CHRO-ACNC: 0.12 IU/ML
UFH PPP CHRO-ACNC: <0.1 IU/ML

## 2024-10-12 PROCEDURE — 027034Z DILATION OF CORONARY ARTERY, ONE ARTERY WITH DRUG-ELUTING INTRALUMINAL DEVICE, PERCUTANEOUS APPROACH: ICD-10-PCS | Performed by: INTERNAL MEDICINE

## 2024-10-12 PROCEDURE — 92928 PRQ TCAT PLMT NTRAC ST 1 LES: CPT | Mod: LD | Performed by: INTERNAL MEDICINE

## 2024-10-12 PROCEDURE — A9270 NON-COVERED ITEM OR SERVICE: HCPCS | Performed by: INTERNAL MEDICINE

## 2024-10-12 PROCEDURE — 4A023N7 MEASUREMENT OF CARDIAC SAMPLING AND PRESSURE, LEFT HEART, PERCUTANEOUS APPROACH: ICD-10-PCS | Performed by: INTERNAL MEDICINE

## 2024-10-12 PROCEDURE — 99232 SBSQ HOSP IP/OBS MODERATE 35: CPT | Performed by: INTERNAL MEDICINE

## 2024-10-12 PROCEDURE — 93458 L HRT ARTERY/VENTRICLE ANGIO: CPT | Mod: 26,59 | Performed by: INTERNAL MEDICINE

## 2024-10-12 PROCEDURE — 80061 LIPID PANEL: CPT

## 2024-10-12 PROCEDURE — 93306 TTE W/DOPPLER COMPLETE: CPT

## 2024-10-12 PROCEDURE — 93308 TTE F-UP OR LMTD: CPT

## 2024-10-12 PROCEDURE — 93010 ELECTROCARDIOGRAM REPORT: CPT | Mod: 59 | Performed by: INTERNAL MEDICINE

## 2024-10-12 PROCEDURE — 93010 ELECTROCARDIOGRAM REPORT: CPT | Performed by: INTERNAL MEDICINE

## 2024-10-12 PROCEDURE — B240ZZ3 ULTRASONOGRAPHY OF SINGLE CORONARY ARTERY, INTRAVASCULAR: ICD-10-PCS | Performed by: INTERNAL MEDICINE

## 2024-10-12 PROCEDURE — 99291 CRITICAL CARE FIRST HOUR: CPT | Performed by: INTERNAL MEDICINE

## 2024-10-12 PROCEDURE — 85520 HEPARIN ASSAY: CPT

## 2024-10-12 PROCEDURE — 770020 HCHG ROOM/CARE - TELE (206)

## 2024-10-12 PROCEDURE — 700117 HCHG RX CONTRAST REV CODE 255: Performed by: INTERNAL MEDICINE

## 2024-10-12 PROCEDURE — B2111ZZ FLUOROSCOPY OF MULTIPLE CORONARY ARTERIES USING LOW OSMOLAR CONTRAST: ICD-10-PCS | Performed by: INTERNAL MEDICINE

## 2024-10-12 PROCEDURE — 700102 HCHG RX REV CODE 250 W/ 637 OVERRIDE(OP): Performed by: INTERNAL MEDICINE

## 2024-10-12 PROCEDURE — 84484 ASSAY OF TROPONIN QUANT: CPT

## 2024-10-12 PROCEDURE — 36415 COLL VENOUS BLD VENIPUNCTURE: CPT

## 2024-10-12 PROCEDURE — 93005 ELECTROCARDIOGRAM TRACING: CPT | Performed by: INTERNAL MEDICINE

## 2024-10-12 PROCEDURE — 99152 MOD SED SAME PHYS/QHP 5/>YRS: CPT | Performed by: INTERNAL MEDICINE

## 2024-10-12 PROCEDURE — A9270 NON-COVERED ITEM OR SERVICE: HCPCS | Performed by: STUDENT IN AN ORGANIZED HEALTH CARE EDUCATION/TRAINING PROGRAM

## 2024-10-12 PROCEDURE — 700111 HCHG RX REV CODE 636 W/ 250 OVERRIDE (IP): Mod: JZ | Performed by: INTERNAL MEDICINE

## 2024-10-12 PROCEDURE — 85347 COAGULATION TIME ACTIVATED: CPT

## 2024-10-12 PROCEDURE — 700101 HCHG RX REV CODE 250

## 2024-10-12 PROCEDURE — 700102 HCHG RX REV CODE 250 W/ 637 OVERRIDE(OP): Performed by: STUDENT IN AN ORGANIZED HEALTH CARE EDUCATION/TRAINING PROGRAM

## 2024-10-12 PROCEDURE — 700102 HCHG RX REV CODE 250 W/ 637 OVERRIDE(OP)

## 2024-10-12 PROCEDURE — 93308 TTE F-UP OR LMTD: CPT | Mod: 26,59 | Performed by: INTERNAL MEDICINE

## 2024-10-12 PROCEDURE — 92978 ENDOLUMINL IVUS OCT C 1ST: CPT | Mod: 26,LD | Performed by: INTERNAL MEDICINE

## 2024-10-12 PROCEDURE — A9270 NON-COVERED ITEM OR SERVICE: HCPCS

## 2024-10-12 PROCEDURE — 700111 HCHG RX REV CODE 636 W/ 250 OVERRIDE (IP): Performed by: STUDENT IN AN ORGANIZED HEALTH CARE EDUCATION/TRAINING PROGRAM

## 2024-10-12 PROCEDURE — 700111 HCHG RX REV CODE 636 W/ 250 OVERRIDE (IP): Mod: JZ,JG

## 2024-10-12 PROCEDURE — 99153 MOD SED SAME PHYS/QHP EA: CPT

## 2024-10-12 RX ORDER — MIDAZOLAM HYDROCHLORIDE 1 MG/ML
INJECTION INTRAMUSCULAR; INTRAVENOUS
Status: COMPLETED
Start: 2024-10-12 | End: 2024-10-12

## 2024-10-12 RX ORDER — ROSUVASTATIN CALCIUM 20 MG/1
20 TABLET, COATED ORAL EVERY EVENING
Status: DISCONTINUED | OUTPATIENT
Start: 2024-10-12 | End: 2024-10-13 | Stop reason: HOSPADM

## 2024-10-12 RX ORDER — HEPARIN SODIUM 200 [USP'U]/100ML
INJECTION, SOLUTION INTRAVENOUS
Status: COMPLETED
Start: 2024-10-12 | End: 2024-10-12

## 2024-10-12 RX ORDER — NITROGLYCERIN 0.4 MG/1
TABLET SUBLINGUAL
Status: COMPLETED
Start: 2024-10-12 | End: 2024-10-12

## 2024-10-12 RX ORDER — METOCLOPRAMIDE HYDROCHLORIDE 5 MG/ML
5 INJECTION INTRAMUSCULAR; INTRAVENOUS ONCE
Status: COMPLETED | OUTPATIENT
Start: 2024-10-12 | End: 2024-10-12

## 2024-10-12 RX ORDER — DIPHENHYDRAMINE HYDROCHLORIDE 50 MG/ML
12.5 INJECTION INTRAMUSCULAR; INTRAVENOUS ONCE
Status: COMPLETED | OUTPATIENT
Start: 2024-10-12 | End: 2024-10-12

## 2024-10-12 RX ORDER — MAGNESIUM SULFATE HEPTAHYDRATE 40 MG/ML
2 INJECTION, SOLUTION INTRAVENOUS ONCE
Status: COMPLETED | OUTPATIENT
Start: 2024-10-12 | End: 2024-10-12

## 2024-10-12 RX ORDER — LIDOCAINE HYDROCHLORIDE 20 MG/ML
INJECTION, SOLUTION INFILTRATION; PERINEURAL
Status: COMPLETED
Start: 2024-10-12 | End: 2024-10-12

## 2024-10-12 RX ORDER — HEPARIN SODIUM 1000 [USP'U]/ML
INJECTION, SOLUTION INTRAVENOUS; SUBCUTANEOUS
Status: COMPLETED
Start: 2024-10-12 | End: 2024-10-12

## 2024-10-12 RX ORDER — ONDANSETRON 2 MG/ML
INJECTION INTRAMUSCULAR; INTRAVENOUS
Status: COMPLETED
Start: 2024-10-12 | End: 2024-10-12

## 2024-10-12 RX ORDER — VERAPAMIL HYDROCHLORIDE 2.5 MG/ML
INJECTION, SOLUTION INTRAVENOUS
Status: COMPLETED
Start: 2024-10-12 | End: 2024-10-12

## 2024-10-12 RX ORDER — PHENYLEPHRINE HCL IN 0.9% NACL 1 MG/10 ML
SYRINGE (ML) INTRAVENOUS
Status: COMPLETED
Start: 2024-10-12 | End: 2024-10-12

## 2024-10-12 RX ORDER — ASPIRIN 81 MG/1
81 TABLET ORAL DAILY
Status: DISCONTINUED | OUTPATIENT
Start: 2024-10-13 | End: 2024-10-13 | Stop reason: HOSPADM

## 2024-10-12 RX ADMIN — METOPROLOL TARTRATE 12.5 MG: 25 TABLET, FILM COATED ORAL at 06:00

## 2024-10-12 RX ADMIN — LIDOCAINE HYDROCHLORIDE: 20 INJECTION, SOLUTION INFILTRATION; PERINEURAL at 08:49

## 2024-10-12 RX ADMIN — METOPROLOL TARTRATE 12.5 MG: 25 TABLET, FILM COATED ORAL at 16:53

## 2024-10-12 RX ADMIN — Medication 300 MCG: at 10:04

## 2024-10-12 RX ADMIN — NITROGLYCERIN 0.4 MG: 0.4 TABLET SUBLINGUAL at 10:00

## 2024-10-12 RX ADMIN — DIPHENHYDRAMINE HYDROCHLORIDE 12.5 MG: 50 INJECTION, SOLUTION INTRAMUSCULAR; INTRAVENOUS at 12:24

## 2024-10-12 RX ADMIN — NITROGLYCERIN 10 ML: 20 INJECTION INTRAVENOUS at 08:49

## 2024-10-12 RX ADMIN — TICAGRELOR 90 MG: 90 TABLET ORAL at 22:22

## 2024-10-12 RX ADMIN — ASPIRIN 81 MG: 81 TABLET, COATED ORAL at 06:00

## 2024-10-12 RX ADMIN — METOCLOPRAMIDE 5 MG: 5 INJECTION, SOLUTION INTRAMUSCULAR; INTRAVENOUS at 12:22

## 2024-10-12 RX ADMIN — ACETAMINOPHEN 650 MG: 325 TABLET ORAL at 10:39

## 2024-10-12 RX ADMIN — VERAPAMIL HYDROCHLORIDE 2.5 MG: 2.5 INJECTION, SOLUTION INTRAVENOUS at 08:48

## 2024-10-12 RX ADMIN — HEPARIN SODIUM: 1000 INJECTION, SOLUTION INTRAVENOUS; SUBCUTANEOUS at 09:51

## 2024-10-12 RX ADMIN — MAGNESIUM SULFATE HEPTAHYDRATE 2 G: 2 INJECTION, SOLUTION INTRAVENOUS at 12:29

## 2024-10-12 RX ADMIN — ACETAMINOPHEN 650 MG: 325 TABLET ORAL at 17:01

## 2024-10-12 RX ADMIN — FENTANYL CITRATE 50 MCG: 50 INJECTION, SOLUTION INTRAMUSCULAR; INTRAVENOUS at 10:05

## 2024-10-12 RX ADMIN — NITROGLYCERIN 1 INCH: 20 OINTMENT TOPICAL at 06:00

## 2024-10-12 RX ADMIN — HEPARIN SODIUM 2000 UNITS: 200 INJECTION, SOLUTION INTRAVENOUS at 08:49

## 2024-10-12 RX ADMIN — IOHEXOL 112 ML: 350 INJECTION, SOLUTION INTRAVENOUS at 10:04

## 2024-10-12 RX ADMIN — HUMAN ALBUMIN MICROSPHERES AND PERFLUTREN 3 ML: 10; .22 INJECTION, SOLUTION INTRAVENOUS at 16:29

## 2024-10-12 RX ADMIN — ROSUVASTATIN CALCIUM 20 MG: 20 TABLET, FILM COATED ORAL at 17:01

## 2024-10-12 RX ADMIN — FENTANYL CITRATE 100 MCG: 50 INJECTION, SOLUTION INTRAMUSCULAR; INTRAVENOUS at 09:44

## 2024-10-12 RX ADMIN — HEPARIN SODIUM: 1000 INJECTION, SOLUTION INTRAVENOUS; SUBCUTANEOUS at 08:49

## 2024-10-12 RX ADMIN — LEVOTHYROXINE SODIUM 100 MCG: 0.1 TABLET ORAL at 06:00

## 2024-10-12 RX ADMIN — TICAGRELOR 180 MG: 90 TABLET ORAL at 09:51

## 2024-10-12 RX ADMIN — HEPARIN SODIUM 2000 UNITS: 1000 INJECTION, SOLUTION INTRAVENOUS; SUBCUTANEOUS at 04:28

## 2024-10-12 RX ADMIN — MIDAZOLAM HYDROCHLORIDE 2 MG: 1 INJECTION, SOLUTION INTRAMUSCULAR; INTRAVENOUS at 09:03

## 2024-10-12 ASSESSMENT — ENCOUNTER SYMPTOMS
FEVER: 0
DEPRESSION: 0
HEADACHES: 0
CHILLS: 0
SHORTNESS OF BREATH: 0
MYALGIAS: 0
DIZZINESS: 0
ABDOMINAL PAIN: 0
NAUSEA: 0

## 2024-10-12 ASSESSMENT — PAIN DESCRIPTION - PAIN TYPE
TYPE: ACUTE PAIN
TYPE: ACUTE PAIN

## 2024-10-12 ASSESSMENT — FIBROSIS 4 INDEX: FIB4 SCORE: 1.93

## 2024-10-13 ENCOUNTER — PHARMACY VISIT (OUTPATIENT)
Dept: PHARMACY | Facility: MEDICAL CENTER | Age: 79
End: 2024-10-13
Payer: COMMERCIAL

## 2024-10-13 VITALS
RESPIRATION RATE: 16 BRPM | SYSTOLIC BLOOD PRESSURE: 111 MMHG | BODY MASS INDEX: 33.27 KG/M2 | HEART RATE: 62 BPM | HEIGHT: 70 IN | OXYGEN SATURATION: 91 % | WEIGHT: 232.37 LBS | DIASTOLIC BLOOD PRESSURE: 71 MMHG | TEMPERATURE: 98.1 F

## 2024-10-13 LAB
ALBUMIN SERPL BCP-MCNC: 3.5 G/DL (ref 3.2–4.9)
ANION GAP SERPL CALC-SCNC: 13 MMOL/L (ref 7–16)
BUN SERPL-MCNC: 17 MG/DL (ref 8–22)
CALCIUM ALBUM COR SERPL-MCNC: 9.8 MG/DL (ref 8.5–10.5)
CALCIUM SERPL-MCNC: 9.4 MG/DL (ref 8.5–10.5)
CHLORIDE SERPL-SCNC: 99 MMOL/L (ref 96–112)
CO2 SERPL-SCNC: 23 MMOL/L (ref 20–33)
CREAT SERPL-MCNC: 0.91 MG/DL (ref 0.5–1.4)
ERYTHROCYTE [DISTWIDTH] IN BLOOD BY AUTOMATED COUNT: 45 FL (ref 35.9–50)
GFR SERPLBLD CREATININE-BSD FMLA CKD-EPI: 86 ML/MIN/1.73 M 2
GLUCOSE SERPL-MCNC: 98 MG/DL (ref 65–99)
HCT VFR BLD AUTO: 44.4 % (ref 42–52)
HGB BLD-MCNC: 15.2 G/DL (ref 14–18)
LV EJECT FRACT  99904: 50
LV EJECT FRACT MOD 2C 99903: 66.53
LV EJECT FRACT MOD 4C 99902: 57.96
LV EJECT FRACT MOD BP 99901: 63.06
MCH RBC QN AUTO: 31.5 PG (ref 27–33)
MCHC RBC AUTO-ENTMCNC: 34.2 G/DL (ref 32.3–36.5)
MCV RBC AUTO: 92.1 FL (ref 81.4–97.8)
PHOSPHATE SERPL-MCNC: 3.7 MG/DL (ref 2.5–4.5)
PLATELET # BLD AUTO: 175 K/UL (ref 164–446)
PMV BLD AUTO: 9.3 FL (ref 9–12.9)
POTASSIUM SERPL-SCNC: 4.7 MMOL/L (ref 3.6–5.5)
RBC # BLD AUTO: 4.82 M/UL (ref 4.7–6.1)
SODIUM SERPL-SCNC: 135 MMOL/L (ref 135–145)
WBC # BLD AUTO: 6.7 K/UL (ref 4.8–10.8)

## 2024-10-13 PROCEDURE — A9270 NON-COVERED ITEM OR SERVICE: HCPCS | Performed by: STUDENT IN AN ORGANIZED HEALTH CARE EDUCATION/TRAINING PROGRAM

## 2024-10-13 PROCEDURE — 99232 SBSQ HOSP IP/OBS MODERATE 35: CPT | Performed by: INTERNAL MEDICINE

## 2024-10-13 PROCEDURE — 36415 COLL VENOUS BLD VENIPUNCTURE: CPT

## 2024-10-13 PROCEDURE — 93306 TTE W/DOPPLER COMPLETE: CPT | Mod: 26 | Performed by: INTERNAL MEDICINE

## 2024-10-13 PROCEDURE — 700102 HCHG RX REV CODE 250 W/ 637 OVERRIDE(OP): Performed by: STUDENT IN AN ORGANIZED HEALTH CARE EDUCATION/TRAINING PROGRAM

## 2024-10-13 PROCEDURE — 99239 HOSP IP/OBS DSCHRG MGMT >30: CPT | Performed by: INTERNAL MEDICINE

## 2024-10-13 PROCEDURE — 80069 RENAL FUNCTION PANEL: CPT

## 2024-10-13 PROCEDURE — RXMED WILLOW AMBULATORY MEDICATION CHARGE: Performed by: INTERNAL MEDICINE

## 2024-10-13 PROCEDURE — 85027 COMPLETE CBC AUTOMATED: CPT

## 2024-10-13 PROCEDURE — 700102 HCHG RX REV CODE 250 W/ 637 OVERRIDE(OP): Performed by: INTERNAL MEDICINE

## 2024-10-13 PROCEDURE — A9270 NON-COVERED ITEM OR SERVICE: HCPCS | Performed by: INTERNAL MEDICINE

## 2024-10-13 RX ORDER — METOPROLOL TARTRATE 25 MG/1
12.5 TABLET, FILM COATED ORAL 2 TIMES DAILY
Qty: 60 TABLET | Refills: 0 | Status: SHIPPED | OUTPATIENT
Start: 2024-10-13 | End: 2024-10-15

## 2024-10-13 RX ORDER — ASPIRIN 81 MG/1
81 TABLET ORAL DAILY
Qty: 30 TABLET | Refills: 0 | Status: SHIPPED | OUTPATIENT
Start: 2024-10-14

## 2024-10-13 RX ORDER — ROSUVASTATIN CALCIUM 20 MG/1
20 TABLET, COATED ORAL EVERY EVENING
Qty: 30 TABLET | Refills: 0 | Status: SHIPPED | OUTPATIENT
Start: 2024-10-13 | End: 2024-10-15 | Stop reason: SDUPTHER

## 2024-10-13 RX ORDER — LOSARTAN POTASSIUM 25 MG/1
25 TABLET ORAL DAILY
Qty: 30 TABLET | Refills: 0 | Status: SHIPPED | OUTPATIENT
Start: 2024-10-14 | End: 2024-10-15 | Stop reason: SDUPTHER

## 2024-10-13 RX ADMIN — LEVOTHYROXINE SODIUM 100 MCG: 0.1 TABLET ORAL at 05:19

## 2024-10-13 RX ADMIN — LOSARTAN POTASSIUM 25 MG: 25 TABLET, FILM COATED ORAL at 05:19

## 2024-10-13 RX ADMIN — TICAGRELOR 90 MG: 90 TABLET ORAL at 05:20

## 2024-10-13 RX ADMIN — METOPROLOL TARTRATE 12.5 MG: 25 TABLET, FILM COATED ORAL at 05:19

## 2024-10-13 RX ADMIN — ASPIRIN 81 MG: 81 TABLET, COATED ORAL at 05:19

## 2024-10-13 ASSESSMENT — FIBROSIS 4 INDEX: FIB4 SCORE: 2.31

## 2024-10-14 ENCOUNTER — PATIENT OUTREACH (OUTPATIENT)
Dept: MEDICAL GROUP | Facility: MEDICAL CENTER | Age: 79
End: 2024-10-14
Payer: MEDICARE

## 2024-10-15 ENCOUNTER — OFFICE VISIT (OUTPATIENT)
Dept: CARDIOLOGY | Facility: MEDICAL CENTER | Age: 79
End: 2024-10-15
Attending: INTERNAL MEDICINE
Payer: MEDICARE

## 2024-10-15 VITALS
SYSTOLIC BLOOD PRESSURE: 124 MMHG | WEIGHT: 235 LBS | HEART RATE: 68 BPM | BODY MASS INDEX: 33.64 KG/M2 | OXYGEN SATURATION: 92 % | RESPIRATION RATE: 16 BRPM | DIASTOLIC BLOOD PRESSURE: 64 MMHG | HEIGHT: 70 IN

## 2024-10-15 DIAGNOSIS — I25.5 ISCHEMIC CARDIOMYOPATHY: ICD-10-CM

## 2024-10-15 DIAGNOSIS — I25.10 CORONARY ARTERY DISEASE INVOLVING NATIVE CORONARY ARTERY OF NATIVE HEART WITHOUT ANGINA PECTORIS: ICD-10-CM

## 2024-10-15 DIAGNOSIS — I71.21 ANEURYSM OF ASCENDING AORTA WITHOUT RUPTURE (HCC): ICD-10-CM

## 2024-10-15 DIAGNOSIS — E78.5 DYSLIPIDEMIA: ICD-10-CM

## 2024-10-15 DIAGNOSIS — I21.4 NSTEMI (NON-ST ELEVATED MYOCARDIAL INFARCTION) (HCC): ICD-10-CM

## 2024-10-15 DIAGNOSIS — Z95.5 STENTED CORONARY ARTERY: ICD-10-CM

## 2024-10-15 DIAGNOSIS — I10 PRIMARY HYPERTENSION: ICD-10-CM

## 2024-10-15 PROCEDURE — 99215 OFFICE O/P EST HI 40 MIN: CPT | Performed by: INTERNAL MEDICINE

## 2024-10-15 PROCEDURE — 99212 OFFICE O/P EST SF 10 MIN: CPT | Performed by: INTERNAL MEDICINE

## 2024-10-15 PROCEDURE — 3078F DIAST BP <80 MM HG: CPT | Performed by: INTERNAL MEDICINE

## 2024-10-15 PROCEDURE — 3074F SYST BP LT 130 MM HG: CPT | Performed by: INTERNAL MEDICINE

## 2024-10-15 PROCEDURE — G2211 COMPLEX E/M VISIT ADD ON: HCPCS | Performed by: INTERNAL MEDICINE

## 2024-10-15 RX ORDER — METOPROLOL SUCCINATE 25 MG/1
25 TABLET, EXTENDED RELEASE ORAL DAILY
Qty: 90 TABLET | Refills: 3 | Status: SHIPPED | OUTPATIENT
Start: 2024-10-15

## 2024-10-15 RX ORDER — LOSARTAN POTASSIUM 25 MG/1
25 TABLET ORAL DAILY
Qty: 90 TABLET | Refills: 3 | Status: SHIPPED | OUTPATIENT
Start: 2024-10-15

## 2024-10-15 RX ORDER — ROSUVASTATIN CALCIUM 20 MG/1
20 TABLET, COATED ORAL EVERY EVENING
Qty: 90 TABLET | Refills: 3 | Status: SHIPPED | OUTPATIENT
Start: 2024-10-15

## 2024-10-15 ASSESSMENT — ENCOUNTER SYMPTOMS
DEPRESSION: 0
PALPITATIONS: 0
CARDIOVASCULAR NEGATIVE: 1
MYALGIAS: 0
CLAUDICATION: 0
ABDOMINAL PAIN: 0
BLURRED VISION: 0
EYES NEGATIVE: 1
FEVER: 0
COUGH: 0
NEUROLOGICAL NEGATIVE: 1
NAUSEA: 0
MUSCULOSKELETAL NEGATIVE: 1
WEIGHT LOSS: 0
BRUISES/BLEEDS EASILY: 0
CHILLS: 0
HEADACHES: 0
FOCAL WEAKNESS: 0
NERVOUS/ANXIOUS: 0
DIZZINESS: 0
PSYCHIATRIC NEGATIVE: 1
VOMITING: 0
CONSTITUTIONAL NEGATIVE: 1
RESPIRATORY NEGATIVE: 1
DOUBLE VISION: 0
WEAKNESS: 0
GASTROINTESTINAL NEGATIVE: 1
SHORTNESS OF BREATH: 0

## 2024-10-15 ASSESSMENT — FIBROSIS 4 INDEX: FIB4 SCORE: 2.31

## 2024-10-17 ENCOUNTER — HOSPITAL ENCOUNTER (OUTPATIENT)
Dept: RADIOLOGY | Facility: MEDICAL CENTER | Age: 79
End: 2024-10-17
Attending: INTERNAL MEDICINE
Payer: MEDICARE

## 2024-10-17 ENCOUNTER — OFFICE VISIT (OUTPATIENT)
Dept: MEDICAL GROUP | Facility: MEDICAL CENTER | Age: 79
End: 2024-10-17
Payer: MEDICARE

## 2024-10-17 VITALS
OXYGEN SATURATION: 95 % | HEART RATE: 65 BPM | WEIGHT: 238.54 LBS | HEIGHT: 70 IN | TEMPERATURE: 97.4 F | DIASTOLIC BLOOD PRESSURE: 74 MMHG | BODY MASS INDEX: 34.15 KG/M2 | SYSTOLIC BLOOD PRESSURE: 122 MMHG

## 2024-10-17 DIAGNOSIS — I10 PRIMARY HYPERTENSION: ICD-10-CM

## 2024-10-17 DIAGNOSIS — I25.10 CORONARY ARTERY DISEASE INVOLVING NATIVE CORONARY ARTERY OF NATIVE HEART WITHOUT ANGINA PECTORIS: ICD-10-CM

## 2024-10-17 DIAGNOSIS — Z95.5 STENTED CORONARY ARTERY: ICD-10-CM

## 2024-10-17 DIAGNOSIS — R73.03 PREDIABETES: ICD-10-CM

## 2024-10-17 DIAGNOSIS — I71.21 ANEURYSM OF ASCENDING AORTA WITHOUT RUPTURE (HCC): ICD-10-CM

## 2024-10-17 DIAGNOSIS — E03.4 HYPOTHYROIDISM DUE TO ACQUIRED ATROPHY OF THYROID: ICD-10-CM

## 2024-10-17 DIAGNOSIS — J30.9 ALLERGIC RHINITIS, UNSPECIFIED SEASONALITY, UNSPECIFIED TRIGGER: ICD-10-CM

## 2024-10-17 DIAGNOSIS — E78.5 DYSLIPIDEMIA: ICD-10-CM

## 2024-10-17 DIAGNOSIS — Z09 HOSPITAL DISCHARGE FOLLOW-UP: Primary | ICD-10-CM

## 2024-10-17 DIAGNOSIS — E66.9 OBESITY (BMI 30-39.9): ICD-10-CM

## 2024-10-17 DIAGNOSIS — I21.4 NSTEMI (NON-ST ELEVATED MYOCARDIAL INFARCTION) (HCC): ICD-10-CM

## 2024-10-17 PROBLEM — T46.6X5A MYALGIA DUE TO STATIN: Status: RESOLVED | Noted: 2022-10-27 | Resolved: 2024-10-17

## 2024-10-17 PROBLEM — M79.10 MYALGIA DUE TO STATIN: Status: RESOLVED | Noted: 2022-10-27 | Resolved: 2024-10-17

## 2024-10-17 PROBLEM — I25.5 ISCHEMIC CARDIOMYOPATHY: Status: RESOLVED | Noted: 2024-10-15 | Resolved: 2024-10-17

## 2024-10-17 PROCEDURE — 71275 CT ANGIOGRAPHY CHEST: CPT

## 2024-10-17 PROCEDURE — 700117 HCHG RX CONTRAST REV CODE 255: Performed by: INTERNAL MEDICINE

## 2024-10-17 RX ORDER — LATANOPROST 50 UG/ML
1 SOLUTION/ DROPS OPHTHALMIC
COMMUNITY
Start: 2024-09-07

## 2024-10-17 RX ORDER — PRASUGREL 10 MG/1
10 TABLET, FILM COATED ORAL DAILY
Qty: 90 TABLET | Refills: 3 | Status: SHIPPED | OUTPATIENT
Start: 2024-10-17

## 2024-10-17 RX ADMIN — IOHEXOL 100 ML: 350 INJECTION, SOLUTION INTRAVENOUS at 18:13

## 2024-10-17 ASSESSMENT — FIBROSIS 4 INDEX: FIB4 SCORE: 2.31

## 2024-10-20 DIAGNOSIS — R73.03 PREDIABETES: ICD-10-CM

## 2024-10-24 RX ORDER — EMPAGLIFLOZIN 10 MG/1
TABLET, FILM COATED ORAL
Qty: 90 TABLET | Refills: 0 | Status: SHIPPED | OUTPATIENT
Start: 2024-10-24

## 2024-11-05 DIAGNOSIS — I21.4 NSTEMI (NON-ST ELEVATED MYOCARDIAL INFARCTION) (HCC): ICD-10-CM

## 2024-11-05 RX ORDER — LOSARTAN POTASSIUM 25 MG/1
25 TABLET ORAL DAILY
Qty: 90 TABLET | Refills: 3 | OUTPATIENT
Start: 2024-11-05

## 2024-11-05 RX ORDER — ROSUVASTATIN CALCIUM 20 MG/1
20 TABLET, COATED ORAL EVERY EVENING
Qty: 90 TABLET | Refills: 3 | OUTPATIENT
Start: 2024-11-05

## 2024-11-05 RX ORDER — LEVOTHYROXINE SODIUM 100 UG/1
100 TABLET ORAL
Qty: 90 TABLET | Refills: 3 | Status: SHIPPED | OUTPATIENT
Start: 2024-11-05

## 2024-11-05 NOTE — TELEPHONE ENCOUNTER
Received request via: Patient    Was the patient seen in the last year in this department? Yes    Does the patient have an active prescription (recently filled or refills available) for medication(s) requested? No    Pharmacy Name: [facility]    Does the patient have alf Plus and need 100-day supply? (This applies to ALL medications) Patient does not have SCP

## 2024-11-10 DIAGNOSIS — I21.4 NSTEMI (NON-ST ELEVATED MYOCARDIAL INFARCTION) (HCC): ICD-10-CM

## 2024-11-11 NOTE — TELEPHONE ENCOUNTER
Per AMANDA last OV notes 10/15/24:    He is clinically doing well without recurrence of his angina. We will continue with current medical care including aspirin, ticagrelor, metoprolol, losartan and rosuvastatin.    Ticagrelor RX renewed.

## 2024-11-27 ENCOUNTER — OFFICE VISIT (OUTPATIENT)
Dept: MEDICAL GROUP | Facility: MEDICAL CENTER | Age: 79
End: 2024-11-27
Payer: MEDICARE

## 2024-11-27 VITALS
WEIGHT: 237.88 LBS | TEMPERATURE: 97.3 F | DIASTOLIC BLOOD PRESSURE: 70 MMHG | BODY MASS INDEX: 34.06 KG/M2 | OXYGEN SATURATION: 92 % | HEIGHT: 70 IN | HEART RATE: 52 BPM | SYSTOLIC BLOOD PRESSURE: 122 MMHG

## 2024-11-27 DIAGNOSIS — I25.10 CORONARY ARTERY DISEASE INVOLVING NATIVE CORONARY ARTERY OF NATIVE HEART WITHOUT ANGINA PECTORIS: ICD-10-CM

## 2024-11-27 DIAGNOSIS — G89.29 CHRONIC PAIN OF LEFT LOWER EXTREMITY: ICD-10-CM

## 2024-11-27 DIAGNOSIS — M79.605 CHRONIC PAIN OF LEFT LOWER EXTREMITY: ICD-10-CM

## 2024-11-27 DIAGNOSIS — M54.42 CHRONIC LEFT-SIDED LOW BACK PAIN WITH LEFT-SIDED SCIATICA: ICD-10-CM

## 2024-11-27 DIAGNOSIS — R73.03 PREDIABETES: ICD-10-CM

## 2024-11-27 DIAGNOSIS — R00.1 BRADYCARDIA: ICD-10-CM

## 2024-11-27 DIAGNOSIS — Z95.820 S/P ANGIOPLASTY WITH STENT: ICD-10-CM

## 2024-11-27 DIAGNOSIS — R53.83 FATIGUE, UNSPECIFIED TYPE: ICD-10-CM

## 2024-11-27 DIAGNOSIS — G89.29 CHRONIC LEFT-SIDED LOW BACK PAIN WITH LEFT-SIDED SCIATICA: ICD-10-CM

## 2024-11-27 PROCEDURE — 3074F SYST BP LT 130 MM HG: CPT | Performed by: FAMILY MEDICINE

## 2024-11-27 PROCEDURE — 3078F DIAST BP <80 MM HG: CPT | Performed by: FAMILY MEDICINE

## 2024-11-27 PROCEDURE — 99214 OFFICE O/P EST MOD 30 MIN: CPT | Performed by: FAMILY MEDICINE

## 2024-11-27 ASSESSMENT — FIBROSIS 4 INDEX: FIB4 SCORE: 2.31

## 2024-12-01 PROBLEM — Z95.820 S/P ANGIOPLASTY WITH STENT: Status: ACTIVE | Noted: 2024-10-15

## 2024-12-01 NOTE — PROGRESS NOTES
Verbal consent was acquired by the patient to use tadoÂ° ambient listening note generation during this visit: YES    CC: Acute fatigue    Assessment & Plan:     1. Fatigue, unspecified type  2. Bradycardia  3. S/P angioplasty with stent  4. Coronary artery disease involving native coronary artery of native heart without angina pectoris  Patient complains of recent development of acute fatigue which she attributes to pharmacotherapy.  Patient was diagnosed with CAD status post LAD stent in 10/2024.  He was started number of manage new medications following the procedure.  Recent labs done in 10/2024 including CBC, CMP were normal.  Patient was diagnosed with hypothyroidism, he is currently taking levothyroxine 100 mcg daily.  He is due to have repeat labs for reassessment.  Denies any recent changes in diet or physical activities.  Patient continues to exercises regularly 4 to 5 days/week.  He denies any chest pain, shortness of breath, dyspnea on exertion, unusual pedal edema.  He had appointment to follow-up with his cardiologist in 12/2024.  He was noted to have bradycardia during office visit today which might contribute to sensation of fatigue.  Will start a trial of discontinuation of metoprolol.  Patient denies symptoms of depression.  - CBC WITH DIFFERENTIAL; Future  - Comp Metabolic Panel; Future  - TSH WITH REFLEX TO FT4; Future  - VITAMIN B12; Future  - continue to monitor for symptoms and follow up in 3 months   - Keep appointment to follow-up with cardiology as directed    5. Prediabetes  Chronic, working on dietary and lifestyle modification to improve this condition.  Due to history of heart disease as above, I attempted to prescribe Jardiance 10 mg daily for management of prediabetes and cardiovascular diseases.  Unfortunately, patient was not able to afford high co-pay associated with this medication.  - Dietary/lifestyle modification and weight loss      6. Chronic left-sided low back pain with  left-sided sciatica  Chronic, stable, improves with regular exercises.  Patient takes meloxicam 7.5 mg daily as needed for his symptoms.             Subjective:       HPI:     History of Present Illness  The patient presents for evaluation of multiple medical concerns.    He reports a lack of energy and motivation, which he attributes to his current medication regimen.  He denies symptoms of depression.  He has discontinued Jardiance due to its high cost. His sleep pattern is normal, with 8 to 10 hours of sleep per night. His exercise routine includes morning bike rides and strider sessions, each lasting 15 minutes, and swimming in the afternoon. He does not have any difficulty in getting out of bed in the morning and expresses a desire to socialize. He reports no rectal bleeding, black stools, or any GI/ symptoms.   He believes his symptoms are side effects of his medications. He does not experience chest pain during exercise or difficulty breathing when lying flat. He takes a daily multivitamin. He monitors his heart rate during exercise, which can reach up to 155 beats per minute, but he does not trust the accuracy of the machine. He does not feel stressed when his heart rate is elevated.  Patient continues to follow-up with his cardiologist for management of CAD status post LAD stent in 10/2024.    Intermittent Low Back Pain  He experiences intermittent low back pain radiating to the left leg. He recalls an incident left anterior thigh trauma 15 to 20 years ago when he was carrying a heavy timber and stepped incorrectly leading to left anterior thigh trauma.  He believes that his incident leads pt persistent left thigh discomfort and abnormal gait.   He was previously diagnosed with severe hip osteoarthritis.  Hip replacement recommended, but patient declined.  At this point, he states that his symptoms are manageable with activity modification and regular exercises.    SOCIAL HISTORY  He does not drink  "alcohol anymore.        Current Outpatient Medications:     meloxicam (MOBIC) 7.5 MG Tab, Take 1 Tablet by mouth every day., Disp: 30 Tablet, Rfl: 1    levothyroxine (SYNTHROID) 100 MCG Tab, Take 1 Tablet by mouth every morning on an empty stomach., Disp: 90 Tablet, Rfl: 3    latanoprost (XALATAN) 0.005 % Solution, Administer 1 Drop into both eyes at bedtime. Instill 1 drop into both eyes every night at bedtime, Disp: , Rfl:     prasugrel (EFFIENT) 10 MG Tab, Take 1 Tablet by mouth every day., Disp: 90 Tablet, Rfl: 3    losartan (COZAAR) 25 MG Tab, Take 1 Tablet by mouth every day., Disp: 90 Tablet, Rfl: 3    rosuvastatin (CRESTOR) 20 MG Tab, Take 1 Tablet by mouth every evening., Disp: 90 Tablet, Rfl: 3    aspirin 81 MG EC tablet, Take 1 Tablet by mouth every day., Disp: 30 Tablet, Rfl: 0    Multiple Vitamins-Minerals (MULTIVITAMIN MEN PO), Take  by mouth., Disp: , Rfl:      Objective:     Exam:  /70 (BP Location: Left arm, Patient Position: Sitting, BP Cuff Size: Large adult)   Pulse (!) 52   Temp 36.3 °C (97.3 °F) (Temporal)   Ht 1.778 m (5' 10\")   Wt 108 kg (237 lb 14 oz)   SpO2 92%   BMI 34.13 kg/m²  Body mass index is 34.13 kg/m².    Constitutional: awake, alert, in no distress.  Skin: Warm, dry, good turgor, no rashes, bruises, ulcers in visible areas.  Eye: conjunctiva clear, lids neg for edema or lesions.  Neck: Trachea midline, no masses, no thyromegaly. No cervical or supraclavicular lymphadenopathy  Respiratory: Unlabored respiratory effort, lungs clear to auscultation, no wheezes, no rales.  Cardiovascular: Normal S1, S2, no murmur, no pedal edema.  Psych: Oriented x3, affect and mood wnl, intact judgement and insight.         Return in about 3 months (around 2/27/2025) for Multiple issues.          Please note that this dictation was created using voice recognition software. I have made every reasonable attempt to correct obvious errors, but I expect that there are errors of grammar and " possibly content that I did not discover before finalizing the note.

## 2024-12-02 ENCOUNTER — APPOINTMENT (OUTPATIENT)
Dept: MEDICAL GROUP | Facility: MEDICAL CENTER | Age: 79
End: 2024-12-02
Payer: MEDICARE

## 2024-12-02 ENCOUNTER — HOSPITAL ENCOUNTER (OUTPATIENT)
Dept: LAB | Facility: MEDICAL CENTER | Age: 79
End: 2024-12-02
Attending: FAMILY MEDICINE
Payer: MEDICARE

## 2024-12-02 DIAGNOSIS — R53.83 FATIGUE, UNSPECIFIED TYPE: ICD-10-CM

## 2024-12-02 LAB
BASOPHILS # BLD AUTO: 0.4 % (ref 0–1.8)
BASOPHILS # BLD: 0.03 K/UL (ref 0–0.12)
EOSINOPHIL # BLD AUTO: 0.07 K/UL (ref 0–0.51)
EOSINOPHIL NFR BLD: 1 % (ref 0–6.9)
ERYTHROCYTE [DISTWIDTH] IN BLOOD BY AUTOMATED COUNT: 49.5 FL (ref 35.9–50)
HCT VFR BLD AUTO: 46 % (ref 42–52)
HGB BLD-MCNC: 14.8 G/DL (ref 14–18)
IMM GRANULOCYTES # BLD AUTO: 0.01 K/UL (ref 0–0.11)
IMM GRANULOCYTES NFR BLD AUTO: 0.1 % (ref 0–0.9)
LYMPHOCYTES # BLD AUTO: 1.54 K/UL (ref 1–4.8)
LYMPHOCYTES NFR BLD: 21.4 % (ref 22–41)
MCH RBC QN AUTO: 30.6 PG (ref 27–33)
MCHC RBC AUTO-ENTMCNC: 32.2 G/DL (ref 32.3–36.5)
MCV RBC AUTO: 95 FL (ref 81.4–97.8)
MONOCYTES # BLD AUTO: 0.5 K/UL (ref 0–0.85)
MONOCYTES NFR BLD AUTO: 6.9 % (ref 0–13.4)
NEUTROPHILS # BLD AUTO: 5.05 K/UL (ref 1.82–7.42)
NEUTROPHILS NFR BLD: 70.2 % (ref 44–72)
NRBC # BLD AUTO: 0 K/UL
NRBC BLD-RTO: 0 /100 WBC (ref 0–0.2)
PLATELET # BLD AUTO: 200 K/UL (ref 164–446)
PMV BLD AUTO: 9.3 FL (ref 9–12.9)
RBC # BLD AUTO: 4.84 M/UL (ref 4.7–6.1)
WBC # BLD AUTO: 7.2 K/UL (ref 4.8–10.8)

## 2024-12-02 PROCEDURE — 80053 COMPREHEN METABOLIC PANEL: CPT

## 2024-12-02 PROCEDURE — 84443 ASSAY THYROID STIM HORMONE: CPT

## 2024-12-02 PROCEDURE — 36415 COLL VENOUS BLD VENIPUNCTURE: CPT

## 2024-12-02 PROCEDURE — 85025 COMPLETE CBC W/AUTO DIFF WBC: CPT

## 2024-12-02 PROCEDURE — 82607 VITAMIN B-12: CPT

## 2024-12-03 LAB
ALBUMIN SERPL BCP-MCNC: 4.1 G/DL (ref 3.2–4.9)
ALBUMIN/GLOB SERPL: 1.3 G/DL
ALP SERPL-CCNC: 82 U/L (ref 30–99)
ALT SERPL-CCNC: 30 U/L (ref 2–50)
ANION GAP SERPL CALC-SCNC: 10 MMOL/L (ref 7–16)
AST SERPL-CCNC: 25 U/L (ref 12–45)
BILIRUB SERPL-MCNC: 0.7 MG/DL (ref 0.1–1.5)
BUN SERPL-MCNC: 21 MG/DL (ref 8–22)
CALCIUM ALBUM COR SERPL-MCNC: 8.8 MG/DL (ref 8.5–10.5)
CALCIUM SERPL-MCNC: 8.9 MG/DL (ref 8.5–10.5)
CHLORIDE SERPL-SCNC: 106 MMOL/L (ref 96–112)
CO2 SERPL-SCNC: 26 MMOL/L (ref 20–33)
CREAT SERPL-MCNC: 0.84 MG/DL (ref 0.5–1.4)
GFR SERPLBLD CREATININE-BSD FMLA CKD-EPI: 88 ML/MIN/1.73 M 2
GLOBULIN SER CALC-MCNC: 3.1 G/DL (ref 1.9–3.5)
GLUCOSE SERPL-MCNC: 80 MG/DL (ref 65–99)
POTASSIUM SERPL-SCNC: 4.2 MMOL/L (ref 3.6–5.5)
PROT SERPL-MCNC: 7.2 G/DL (ref 6–8.2)
SODIUM SERPL-SCNC: 142 MMOL/L (ref 135–145)
TSH SERPL DL<=0.005 MIU/L-ACNC: 3.24 UIU/ML (ref 0.38–5.33)
VIT B12 SERPL-MCNC: 294 PG/ML (ref 211–911)

## 2024-12-09 ENCOUNTER — NON-PROVIDER VISIT (OUTPATIENT)
Dept: CARDIOLOGY | Facility: MEDICAL CENTER | Age: 79
End: 2024-12-09
Payer: MEDICARE

## 2024-12-09 DIAGNOSIS — I21.4 NSTEMI (NON-ST ELEVATED MYOCARDIAL INFARCTION) (HCC): Primary | ICD-10-CM

## 2024-12-09 PROCEDURE — G0423 INTENS CARDIAC REHAB NO EXER: HCPCS | Mod: 59 | Performed by: INTERNAL MEDICINE

## 2024-12-09 PROCEDURE — G0422 INTENS CARDIAC REHAB W/EXERC: HCPCS | Performed by: INTERNAL MEDICINE

## 2024-12-09 NOTE — PROGRESS NOTES
Patient arrived for initial 1:1 Intensive Cardiac Rehabilitation Consultation and Education session with the Registered Nurse.  A total of 60 minutes was spent with the patient during which time a focused cardiovascular assessment was completed and musculoskeletal limitations were addressed in preparation to safely start the exercise portion of the program.  Education regarding the program was explained including exercise goals, precautions, and target heart rate during exercise.  Nutrition goals were reviewed and patient was introduced to the [facility] Program.  Stress management goals were discussed and patient concerns and questions were answered at this time. Patient arrived for education at 0800 and visit was concluded at 0900. Exercise time was from 0900 to 1000.

## 2024-12-10 ENCOUNTER — NON-PROVIDER VISIT (OUTPATIENT)
Dept: CARDIOLOGY | Facility: MEDICAL CENTER | Age: 79
End: 2024-12-10
Payer: MEDICARE

## 2024-12-10 DIAGNOSIS — I21.4 NSTEMI (NON-ST ELEVATED MYOCARDIAL INFARCTION) (HCC): ICD-10-CM

## 2024-12-10 PROCEDURE — G0422 INTENS CARDIAC REHAB W/EXERC: HCPCS | Performed by: INTERNAL MEDICINE

## 2024-12-10 PROCEDURE — G0423 INTENS CARDIAC REHAB NO EXER: HCPCS | Mod: 59 | Performed by: INTERNAL MEDICINE

## 2024-12-10 NOTE — PROGRESS NOTES
Price Moon attended Intensive Cardiac Rehab today from 0800 to 1000. During his time he exercised and attended class.   His education today was a WORKSHOP titled: EXERCISE BASICS. Patient received handouts and class discussion pertaining to the topic.

## 2024-12-11 ENCOUNTER — NON-PROVIDER VISIT (OUTPATIENT)
Dept: CARDIOLOGY | Facility: MEDICAL CENTER | Age: 79
End: 2024-12-11
Payer: MEDICARE

## 2024-12-11 DIAGNOSIS — I21.4 NSTEMI (NON-ST ELEVATED MYOCARDIAL INFARCTION) (HCC): ICD-10-CM

## 2024-12-11 PROCEDURE — G0422 INTENS CARDIAC REHAB W/EXERC: HCPCS | Performed by: INTERNAL MEDICINE

## 2024-12-11 PROCEDURE — G0423 INTENS CARDIAC REHAB NO EXER: HCPCS | Mod: 59 | Performed by: INTERNAL MEDICINE

## 2024-12-11 NOTE — PROGRESS NOTES
Price Moon attended Intensive Cardiac Rehab today from 0800 to 1000. During his time he exercised and attended class.   His education today was a cooking school titled: Adding Flavor Sodium Free. Patient received handouts and class discussion pertaining to the topic.

## 2024-12-12 ENCOUNTER — NON-PROVIDER VISIT (OUTPATIENT)
Dept: CARDIOLOGY | Facility: MEDICAL CENTER | Age: 79
End: 2024-12-12
Payer: MEDICARE

## 2024-12-12 DIAGNOSIS — I21.4 NSTEMI (NON-ST ELEVATED MYOCARDIAL INFARCTION) (HCC): ICD-10-CM

## 2024-12-12 PROCEDURE — G0422 INTENS CARDIAC REHAB W/EXERC: HCPCS | Performed by: INTERNAL MEDICINE

## 2024-12-12 PROCEDURE — G0423 INTENS CARDIAC REHAB NO EXER: HCPCS | Mod: 59 | Performed by: INTERNAL MEDICINE

## 2024-12-12 NOTE — PROGRESS NOTES
Price Moon attended Intensive Cardiac Rehab today from 0800 to 1000. During his time he exercised and attended class.   His education today was a video titled: Body Composition. Patient received handouts and class discussion pertaining to the topic.

## 2024-12-17 ENCOUNTER — NON-PROVIDER VISIT (OUTPATIENT)
Dept: CARDIOLOGY | Facility: MEDICAL CENTER | Age: 79
End: 2024-12-17
Payer: MEDICARE

## 2024-12-17 DIAGNOSIS — I21.4 NSTEMI (NON-ST ELEVATED MYOCARDIAL INFARCTION) (HCC): ICD-10-CM

## 2024-12-17 PROCEDURE — G0422 INTENS CARDIAC REHAB W/EXERC: HCPCS | Performed by: INTERNAL MEDICINE

## 2024-12-17 PROCEDURE — G0423 INTENS CARDIAC REHAB NO EXER: HCPCS | Mod: 59 | Performed by: INTERNAL MEDICINE

## 2024-12-17 NOTE — PROGRESS NOTES
Price Moon attended Intensive Cardiac Rehab today from 0800 to 1000. During his time he exercised and attended class.   His education today was a video titled: Label Reading. Patient received handouts and class discussion pertaining to the topic.

## 2024-12-18 ENCOUNTER — NON-PROVIDER VISIT (OUTPATIENT)
Dept: CARDIOLOGY | Facility: MEDICAL CENTER | Age: 79
End: 2024-12-18
Payer: MEDICARE

## 2024-12-18 DIAGNOSIS — I21.4 NSTEMI (NON-ST ELEVATED MYOCARDIAL INFARCTION) (HCC): ICD-10-CM

## 2024-12-18 PROCEDURE — G0423 INTENS CARDIAC REHAB NO EXER: HCPCS | Mod: 59 | Performed by: INTERNAL MEDICINE

## 2024-12-18 PROCEDURE — G0422 INTENS CARDIAC REHAB W/EXERC: HCPCS | Performed by: INTERNAL MEDICINE

## 2024-12-18 NOTE — PROGRESS NOTES
Price Moon attended Intensive Cardiac Rehab today from 0800 to 1000. During his time he exercised and attended class.   His education today was a COOKING WORKSHOP titled: FAST AND HEALTHY BREAKFASTS. Patient received handouts and class discussion pertaining to the topic.

## 2024-12-19 ENCOUNTER — NON-PROVIDER VISIT (OUTPATIENT)
Dept: CARDIOLOGY | Facility: MEDICAL CENTER | Age: 79
End: 2024-12-19
Payer: MEDICARE

## 2024-12-19 DIAGNOSIS — I21.4 NSTEMI (NON-ST ELEVATED MYOCARDIAL INFARCTION) (HCC): ICD-10-CM

## 2024-12-19 PROCEDURE — G0423 INTENS CARDIAC REHAB NO EXER: HCPCS | Mod: 59 | Performed by: INTERNAL MEDICINE

## 2024-12-19 PROCEDURE — G0422 INTENS CARDIAC REHAB W/EXERC: HCPCS | Performed by: INTERNAL MEDICINE

## 2024-12-19 NOTE — PROGRESS NOTES
Price Moon attended Intensive Cardiac Rehab today from 0800 to 1000. During his time he exercised and attended class.   His education today was a workshop titled: Lady. Patient received handouts and class discussion pertaining to the topic.

## 2024-12-26 ENCOUNTER — NON-PROVIDER VISIT (OUTPATIENT)
Dept: CARDIOLOGY | Facility: MEDICAL CENTER | Age: 79
End: 2024-12-26
Payer: MEDICARE

## 2024-12-26 DIAGNOSIS — I21.4 NSTEMI (NON-ST ELEVATED MYOCARDIAL INFARCTION) (HCC): ICD-10-CM

## 2024-12-26 PROCEDURE — G0423 INTENS CARDIAC REHAB NO EXER: HCPCS | Performed by: INTERNAL MEDICINE

## 2024-12-26 PROCEDURE — G0422 INTENS CARDIAC REHAB W/EXERC: HCPCS | Performed by: INTERNAL MEDICINE

## 2024-12-26 NOTE — PROGRESS NOTES
Price Moon attended Intensive Cardiac Rehab today from 0800 to 1000. During his time he exercised and attended class.   His education today was a workshop titled: FOCUSED GOALS AND SUSTAINABLE CHANGE. Patient received handouts and class discussion pertaining to the topic.

## 2024-12-31 ENCOUNTER — NON-PROVIDER VISIT (OUTPATIENT)
Dept: CARDIOLOGY | Facility: MEDICAL CENTER | Age: 79
End: 2024-12-31
Payer: MEDICARE

## 2024-12-31 DIAGNOSIS — I21.4 NSTEMI (NON-ST ELEVATED MYOCARDIAL INFARCTION) (HCC): ICD-10-CM

## 2024-12-31 NOTE — PROGRESS NOTES
Price Moon attended Intensive Cardiac Rehab today from 0800 to 1000. During his time he exercised and attended class.   His education today was a VIDEO titled: BIOMECHANICAL LIMITATIONS. Patient received handouts and class discussion pertaining to the topic.

## 2025-01-02 ENCOUNTER — NON-PROVIDER VISIT (OUTPATIENT)
Dept: CARDIOLOGY | Facility: MEDICAL CENTER | Age: 80
End: 2025-01-02
Payer: MEDICARE

## 2025-01-02 DIAGNOSIS — I21.4 NSTEMI (NON-ST ELEVATED MYOCARDIAL INFARCTION) (HCC): ICD-10-CM

## 2025-01-02 PROCEDURE — G0422 INTENS CARDIAC REHAB W/EXERC: HCPCS | Performed by: INTERNAL MEDICINE

## 2025-01-02 PROCEDURE — G0423 INTENS CARDIAC REHAB NO EXER: HCPCS | Mod: 59 | Performed by: INTERNAL MEDICINE

## 2025-01-02 NOTE — PROGRESS NOTES
Price Moon attended Intensive Cardiac Rehab today from 0800 to 1000. During his time he exercised and attended class.   His education today was a WORKSHOP titled: BIOMECHANICAL LIMITATIONS. Patient received handouts and class discussion pertaining to the topic.

## 2025-01-07 ENCOUNTER — NON-PROVIDER VISIT (OUTPATIENT)
Dept: CARDIOLOGY | Facility: MEDICAL CENTER | Age: 80
End: 2025-01-07
Payer: MEDICARE

## 2025-01-07 DIAGNOSIS — I21.4 NSTEMI (NON-ST ELEVATED MYOCARDIAL INFARCTION) (HCC): ICD-10-CM

## 2025-01-07 PROCEDURE — G0422 INTENS CARDIAC REHAB W/EXERC: HCPCS | Performed by: INTERNAL MEDICINE

## 2025-01-07 PROCEDURE — G0423 INTENS CARDIAC REHAB NO EXER: HCPCS | Mod: 59 | Performed by: INTERNAL MEDICINE

## 2025-01-07 NOTE — PROGRESS NOTES
Price Moon attended Intensive Cardiac Rehab today from 0800 to 1000. During his time he exercised and attended class.   His education today was a VIDEO titled: DECODING LABS. Patient received handouts and class discussion pertaining to the topic.

## 2025-01-08 ENCOUNTER — NON-PROVIDER VISIT (OUTPATIENT)
Dept: CARDIOLOGY | Facility: MEDICAL CENTER | Age: 80
End: 2025-01-08
Payer: MEDICARE

## 2025-01-08 DIAGNOSIS — I21.4 NSTEMI (NON-ST ELEVATED MYOCARDIAL INFARCTION) (HCC): ICD-10-CM

## 2025-01-08 PROCEDURE — G0422 INTENS CARDIAC REHAB W/EXERC: HCPCS | Performed by: INTERNAL MEDICINE

## 2025-01-08 PROCEDURE — G0423 INTENS CARDIAC REHAB NO EXER: HCPCS | Mod: 59 | Performed by: INTERNAL MEDICINE

## 2025-01-08 NOTE — PROGRESS NOTES
Price Gilberto Sarai attended Intensive Cardiac Rehab today from 0800 to 1000. During his time he exercised and attended class.   His education today was a cooking school titled: Simple Sides and Sauces. Patient received handouts and class discussion pertaining to the topic.

## 2025-01-09 ENCOUNTER — HOSPITAL ENCOUNTER (OUTPATIENT)
Dept: CARDIOLOGY | Facility: MEDICAL CENTER | Age: 80
End: 2025-01-09
Attending: INTERNAL MEDICINE
Payer: MEDICARE

## 2025-01-09 DIAGNOSIS — I25.5 ISCHEMIC CARDIOMYOPATHY: ICD-10-CM

## 2025-01-09 DIAGNOSIS — I10 PRIMARY HYPERTENSION: ICD-10-CM

## 2025-01-09 PROCEDURE — 93306 TTE W/DOPPLER COMPLETE: CPT

## 2025-01-09 PROCEDURE — 700117 HCHG RX CONTRAST REV CODE 255: Performed by: INTERNAL MEDICINE

## 2025-01-09 RX ADMIN — HUMAN ALBUMIN MICROSPHERES AND PERFLUTREN 3 ML: 10; .22 INJECTION, SOLUTION INTRAVENOUS at 11:30

## 2025-01-10 PROCEDURE — 93306 TTE W/DOPPLER COMPLETE: CPT | Mod: 26 | Performed by: INTERNAL MEDICINE

## 2025-01-14 ENCOUNTER — NON-PROVIDER VISIT (OUTPATIENT)
Dept: CARDIOLOGY | Facility: MEDICAL CENTER | Age: 80
End: 2025-01-14
Payer: MEDICARE

## 2025-01-14 DIAGNOSIS — I21.4 NSTEMI (NON-ST ELEVATED MYOCARDIAL INFARCTION) (HCC): ICD-10-CM

## 2025-01-14 PROCEDURE — G0422 INTENS CARDIAC REHAB W/EXERC: HCPCS | Performed by: INTERNAL MEDICINE

## 2025-01-14 PROCEDURE — G0423 INTENS CARDIAC REHAB NO EXER: HCPCS | Mod: 59 | Performed by: INTERNAL MEDICINE

## 2025-01-14 NOTE — PROGRESS NOTES
Price Moon attended Intensive Cardiac Rehab today from 0800 to 1000. During his time he exercised and attended class.   His education today was a video titled: How Our Thoughts Heal Our Hearts. Patient received handouts and class discussion pertaining to the topic.

## 2025-01-15 ENCOUNTER — NON-PROVIDER VISIT (OUTPATIENT)
Dept: CARDIOLOGY | Facility: MEDICAL CENTER | Age: 80
End: 2025-01-15
Payer: MEDICARE

## 2025-01-15 DIAGNOSIS — I21.4 NSTEMI (NON-ST ELEVATED MYOCARDIAL INFARCTION) (HCC): ICD-10-CM

## 2025-01-15 PROCEDURE — G0422 INTENS CARDIAC REHAB W/EXERC: HCPCS | Performed by: INTERNAL MEDICINE

## 2025-01-15 PROCEDURE — G0423 INTENS CARDIAC REHAB NO EXER: HCPCS | Mod: 59 | Performed by: INTERNAL MEDICINE

## 2025-01-15 NOTE — PROGRESS NOTES
Price Moon attended Intensive Cardiac Rehab today from 0800 to 1000. During his time he exercised and attended class.   His education today was a [facility] LIVE COOKING WORKSHOP titled: INTERNATIONAL Mixer[employer]ISINE. Patient received handouts and class discussion pertaining to the topic.

## 2025-01-16 ENCOUNTER — NON-PROVIDER VISIT (OUTPATIENT)
Dept: CARDIOLOGY | Facility: MEDICAL CENTER | Age: 80
End: 2025-01-16
Payer: MEDICARE

## 2025-01-16 ENCOUNTER — OFFICE VISIT (OUTPATIENT)
Dept: CARDIOLOGY | Facility: MEDICAL CENTER | Age: 80
End: 2025-01-16
Attending: INTERNAL MEDICINE
Payer: MEDICARE

## 2025-01-16 VITALS
WEIGHT: 228 LBS | HEART RATE: 82 BPM | DIASTOLIC BLOOD PRESSURE: 70 MMHG | RESPIRATION RATE: 12 BRPM | HEIGHT: 70 IN | BODY MASS INDEX: 32.64 KG/M2 | OXYGEN SATURATION: 96 % | SYSTOLIC BLOOD PRESSURE: 110 MMHG

## 2025-01-16 DIAGNOSIS — I25.10 CORONARY ARTERY DISEASE INVOLVING NATIVE CORONARY ARTERY OF NATIVE HEART WITHOUT ANGINA PECTORIS: ICD-10-CM

## 2025-01-16 DIAGNOSIS — Z95.820 S/P ANGIOPLASTY WITH STENT: ICD-10-CM

## 2025-01-16 DIAGNOSIS — R00.1 BRADYCARDIA: ICD-10-CM

## 2025-01-16 DIAGNOSIS — E78.5 DYSLIPIDEMIA: ICD-10-CM

## 2025-01-16 DIAGNOSIS — I21.4 NSTEMI (NON-ST ELEVATED MYOCARDIAL INFARCTION) (HCC): ICD-10-CM

## 2025-01-16 DIAGNOSIS — I71.21 ANEURYSM OF ASCENDING AORTA WITHOUT RUPTURE (HCC): ICD-10-CM

## 2025-01-16 DIAGNOSIS — I10 PRIMARY HYPERTENSION: ICD-10-CM

## 2025-01-16 LAB — LV EJECT FRACT  99904: 55

## 2025-01-16 PROCEDURE — 99212 OFFICE O/P EST SF 10 MIN: CPT | Performed by: INTERNAL MEDICINE

## 2025-01-16 PROCEDURE — 99214 OFFICE O/P EST MOD 30 MIN: CPT | Performed by: INTERNAL MEDICINE

## 2025-01-16 PROCEDURE — 3074F SYST BP LT 130 MM HG: CPT | Performed by: INTERNAL MEDICINE

## 2025-01-16 PROCEDURE — G0422 INTENS CARDIAC REHAB W/EXERC: HCPCS | Performed by: INTERNAL MEDICINE

## 2025-01-16 PROCEDURE — G2211 COMPLEX E/M VISIT ADD ON: HCPCS | Performed by: INTERNAL MEDICINE

## 2025-01-16 PROCEDURE — 3078F DIAST BP <80 MM HG: CPT | Performed by: INTERNAL MEDICINE

## 2025-01-16 PROCEDURE — G0423 INTENS CARDIAC REHAB NO EXER: HCPCS | Mod: 59 | Performed by: INTERNAL MEDICINE

## 2025-01-16 ASSESSMENT — ENCOUNTER SYMPTOMS
DEPRESSION: 0
PSYCHIATRIC NEGATIVE: 1
CONSTITUTIONAL NEGATIVE: 1
NAUSEA: 0
FEVER: 0
MUSCULOSKELETAL NEGATIVE: 1
EYES NEGATIVE: 1
CARDIOVASCULAR NEGATIVE: 1
CHILLS: 0
CLAUDICATION: 0
DIZZINESS: 0
SHORTNESS OF BREATH: 0
COUGH: 0
WEAKNESS: 0
HEADACHES: 0
NERVOUS/ANXIOUS: 0
ABDOMINAL PAIN: 0
NEUROLOGICAL NEGATIVE: 1
RESPIRATORY NEGATIVE: 1
VOMITING: 0
MYALGIAS: 0
FOCAL WEAKNESS: 0
WEIGHT LOSS: 0
BLURRED VISION: 0
BRUISES/BLEEDS EASILY: 0
PALPITATIONS: 0
DOUBLE VISION: 0
GASTROINTESTINAL NEGATIVE: 1

## 2025-01-16 ASSESSMENT — FIBROSIS 4 INDEX: FIB4 SCORE: 1.8

## 2025-01-16 NOTE — PROGRESS NOTES
Chief Complaint   Patient presents with    MI (Non ST Segment Elevation MI)     F/V DX: N STEMI       Subjective     Price Moon is a 79 y.o. male who presents today for follow up of coronary artery disease with prior stent placement, hypertension and hyperlipidemia, ascending aortic aneurysm.    Since the patient's last visit on 10/15/24, he has been doing well clinically from cardiac standpoint. He denies fatigue, chest pain, shortness of breath, palpitations, lower extremity edema, dizziness or syncope. He keeps active exercising, martial arts, swimming.     Past Medical History:   Diagnosis Date    BPH (benign prostatic hyperplasia)     CAD (coronary artery disease)     Hyperlipidemia     Hypertension     Thyroid disease      Past Surgical History:   Procedure Laterality Date    ANGIOPLASTY      EYE SURGERY      HERNIA REPAIR      ZZZ CARDIAC CATH       Family History   Problem Relation Age of Onset    Hypertension Mother     Other Father         ALS    Hypertension Brother     No Known Problems Maternal Grandmother     No Known Problems Maternal Grandfather     Heart Disease Paternal Grandmother     Hypertension Paternal Grandmother     No Known Problems Paternal Grandfather     No Known Problems Son     No Known Problems Son     No Known Problems Son     No Known Problems Son     Heart Attack Neg Hx      Social History     Socioeconomic History    Marital status:      Spouse name: Not on file    Number of children: Not on file    Years of education: Not on file    Highest education level: Bachelor's degree (e.g., BA, AB, BS)   Occupational History    Not on file   Tobacco Use    Smoking status: Former     Current packs/day: 0.00     Types: Cigarettes     Quit date: 1985     Years since quittin.0     Passive exposure: Past    Smokeless tobacco: Never   Vaping Use    Vaping status: Never Used   Substance and Sexual Activity    Alcohol use: No     Alcohol/week: 0.0 oz    Drug use: No     Sexual activity: Yes     Partners: Female   Other Topics Concern     Service Yes    Blood Transfusions No    Caffeine Concern No    Occupational Exposure Yes    Hobby Hazards Yes    Sleep Concern No    Stress Concern No    Weight Concern Yes    Special Diet No    Back Care No    Exercise Yes    Bike Helmet No    Seat Belt Yes    Self-Exams No   Social History Narrative    Not on file     Social Drivers of Health     Financial Resource Strain: Low Risk  (10/26/2022)    Overall Financial Resource Strain (CARDIA)     Difficulty of Paying Living Expenses: Not hard at all   Food Insecurity: No Food Insecurity (10/11/2024)    Hunger Vital Sign     Worried About Running Out of Food in the Last Year: Never true     Ran Out of Food in the Last Year: Never true   Transportation Needs: No Transportation Needs (10/11/2024)    PRAPARE - Transportation     Lack of Transportation (Medical): No     Lack of Transportation (Non-Medical): No   Physical Activity: Sufficiently Active (10/29/2023)    Exercise Vital Sign     Days of Exercise per Week: 6 days     Minutes of Exercise per Session: 60 min   Stress: No Stress Concern Present (10/29/2023)    [employer] - Occupational Stress Questionnaire     Feeling of Stress : Not at all   Social Connections: Unknown (10/29/2023)    Social Connection and Isolation Panel [NHANES]     Frequency of Communication with Friends and Family: More than three times a week     Frequency of Social Gatherings with Friends and Family: Once a week     Attends Holiness Services: Patient declined     Active Member of Clubs or Organizations: No     Attends Club or Organization Meetings: Never     Marital Status:    Intimate Partner Violence: Not At Risk (10/11/2024)    Humiliation, Afraid, Rape, and Kick questionnaire     Fear of Current or Ex-Partner: No     Emotionally Abused: No     Physically Abused: No     Sexually Abused: No   Housing Stability: Low Risk   (10/11/2024)    Housing Stability Vital Sign     Unable to Pay for Housing in the Last Year: No     Number of Times Moved in the Last Year: 0     Homeless in the Last Year: No     No Known Allergies    (Medications reviewed.)  Outpatient Encounter Medications as of 1/16/2025   Medication Sig Dispense Refill    meloxicam (MOBIC) 7.5 MG Tab Take 1 Tablet by mouth every day. 30 Tablet 1    levothyroxine (SYNTHROID) 100 MCG Tab Take 1 Tablet by mouth every morning on an empty stomach. 90 Tablet 3    latanoprost (XALATAN) 0.005 % Solution Administer 1 Drop into both eyes at bedtime. Instill 1 drop into both eyes every night at bedtime      prasugrel (EFFIENT) 10 MG Tab Take 1 Tablet by mouth every day. 90 Tablet 3    losartan (COZAAR) 25 MG Tab Take 1 Tablet by mouth every day. 90 Tablet 3    rosuvastatin (CRESTOR) 20 MG Tab Take 1 Tablet by mouth every evening. 90 Tablet 3    aspirin 81 MG EC tablet Take 1 Tablet by mouth every day. 30 Tablet 0    Multiple Vitamins-Minerals (MULTIVITAMIN MEN PO) Take  by mouth.       No facility-administered encounter medications on file as of 1/16/2025.     Review of Systems   Constitutional: Negative.  Negative for chills, fever, malaise/fatigue and weight loss.   HENT: Negative.  Negative for hearing loss.    Eyes: Negative.  Negative for blurred vision and double vision.   Respiratory: Negative.  Negative for cough and shortness of breath.    Cardiovascular: Negative.  Negative for chest pain, palpitations, claudication and leg swelling.   Gastrointestinal: Negative.  Negative for abdominal pain, nausea and vomiting.   Genitourinary: Negative.  Negative for dysuria and urgency.   Musculoskeletal: Negative.  Negative for joint pain and myalgias.   Skin: Negative.  Negative for itching and rash.   Neurological: Negative.  Negative for dizziness, focal weakness, weakness and headaches.   Endo/Heme/Allergies: Negative.  Does not bruise/bleed easily.   Psychiatric/Behavioral: Negative.   "Negative for depression. The patient is not nervous/anxious.               Objective     /70 (BP Location: Left arm, Patient Position: Sitting, BP Cuff Size: Adult)   Pulse 82   Resp 12   Ht 1.778 m (5' 10\")   Wt 103 kg (228 lb)   SpO2 96%   BMI 32.71 kg/m²     Physical Exam  Constitutional:       Appearance: Normal appearance. He is well-developed and normal weight.   HENT:      Head: Normocephalic and atraumatic.   Neck:      Vascular: No JVD.   Cardiovascular:      Rate and Rhythm: Normal rate and regular rhythm.      Heart sounds: Normal heart sounds.   Pulmonary:      Effort: Pulmonary effort is normal.      Breath sounds: Normal breath sounds.   Abdominal:      General: Bowel sounds are normal.      Palpations: Abdomen is soft.      Comments: No hepatosplenomegaly.   Musculoskeletal:         General: Normal range of motion.   Lymphadenopathy:      Cervical: No cervical adenopathy.   Skin:     General: Skin is warm and dry.   Neurological:      Mental Status: He is alert and oriented to person, place, and time.            CARDIAC STUDIES/PROCEDURES:     CARDIAC CATHETERIZATION CONCLUSIONS by Matias Rogers (10/11/24)  1.  Severe mid LAD stenosis status post successful IVUS guided PCI deploying Synergy 3 x 20 mm HERBERT postdilated to about 3.5 - 3.6 mm.  2.  Elevated resting LVEDP 20 mmHg with no significant transaortic gradient on pullback  3.  Post PCI Limited echocardiogram without signs of pericardial fusion  (study result reviewed    CTA OF CHEST (10/17/24)  Mildly dilated ascending aorta measuring 4.0 cm. No aortic dissection.   (study result reviewed)     ECHOCARDIOGRAM CONCLUSIONS (01/09/25)  Prior study on 10/12/2024, compared to the report of the prior study,   there has been improvement of left ventricular systolic function.  Normal left ventricular systolic function.  The left ventricular ejection fraction is visually estimated to be 55-60%.  No significant valvular abnormalities.   The " ascending aorta is dilated with a diameter of 4.5 cm.  (study result reviewed)      ECHOCARDIOGRAM CONCLUSIONS (10/12/24)  Normal left ventricular cavity size, mild concentric hypertrophy and low normal systolic function, visually estimated LVEF of 50%.    Mild subtle hypokinesis of the distal anteroseptal/anteroapical walls.   Normal right ventricular systolic function.   Grossly normal appearing valves without significant stenosis or regurgitation.   Mildly enlarged aortic too size, measured 4.0 cm.    The ascending aorta is dilated with a diameter of 4.5 cm.  (study result reviewed)     EKG performed on (10/12/24) was reviewed: EKG personally interpreted shows sinus bradycardia.     Laboratory results of (10/12/24) were reviewed. Cholesterol profile of 119/125/46/48 mg/dL noted.    Assessment & Plan     1. Coronary artery disease involving native coronary artery of native heart without angina pectoris        2. S/P angioplasty with stent_LAD_10/2024.        3. Primary hypertension        4. Dyslipidemia        5. Bradycardia        6. Aneurysm of ascending aorta without rupture (HCC)            Medical Decision Making: Today's Assessment/Status/Plan:        Coronary artery disease with stent placement: He is a 79 year old male with coronary artery disease with prior stent placement, hypertension and hyperlipidemia, ascending aortic aneurysm. He is clinically doing well without recurrence of his angina. We will continue with current medical care including aspirin, prasugrel, losartan and rosuvastatin.   Hypertension: Currently, the average blood pressure is well controlled. We will continue with angiotensin receptor blockade .  Hyperlipidemia: He is doing well on statin therapy without myalgia symptoms.  Bradycardia: Currently beta blockade therapy are unable to be initiated due to bradycardia.   Ascending aortic aneurysm: Stable. We will repeat an echocardiogram.     We will follow up in 9 months.    CC Dr. Lu,  Ly

## 2025-01-16 NOTE — PROGRESS NOTES
Price Moon attended Intensive Cardiac Rehab today from 0800 to 1000. During his time he exercised and attended class.   His education today was a NUTRITION WORKSHOP titled: MINDFUL EATING. Patient received handouts and class discussion pertaining to the topic.

## 2025-01-21 ENCOUNTER — NON-PROVIDER VISIT (OUTPATIENT)
Dept: CARDIOLOGY | Facility: MEDICAL CENTER | Age: 80
End: 2025-01-21
Payer: MEDICARE

## 2025-01-21 ENCOUNTER — APPOINTMENT (OUTPATIENT)
Dept: MEDICAL GROUP | Facility: MEDICAL CENTER | Age: 80
End: 2025-01-21
Payer: MEDICARE

## 2025-01-21 DIAGNOSIS — I21.4 NSTEMI (NON-ST ELEVATED MYOCARDIAL INFARCTION) (HCC): ICD-10-CM

## 2025-01-21 PROCEDURE — G0423 INTENS CARDIAC REHAB NO EXER: HCPCS | Mod: 59 | Performed by: INTERNAL MEDICINE

## 2025-01-21 PROCEDURE — G0422 INTENS CARDIAC REHAB W/EXERC: HCPCS | Performed by: INTERNAL MEDICINE

## 2025-01-21 NOTE — PROGRESS NOTES
Price Moon attended Intensive Cardiac Rehab today from 0800 to 1000. During his time he exercised and attended class.   His education today was a VIDEO titled: METABOLIC SYNDROME. Patient received handouts and class discussion pertaining to the topic.

## 2025-01-22 ENCOUNTER — NON-PROVIDER VISIT (OUTPATIENT)
Dept: CARDIOLOGY | Facility: MEDICAL CENTER | Age: 80
End: 2025-01-22
Payer: MEDICARE

## 2025-01-22 DIAGNOSIS — I21.4 NSTEMI (NON-ST ELEVATED MYOCARDIAL INFARCTION) (HCC): ICD-10-CM

## 2025-01-22 PROCEDURE — G0423 INTENS CARDIAC REHAB NO EXER: HCPCS | Mod: 59 | Performed by: INTERNAL MEDICINE

## 2025-01-22 PROCEDURE — G0422 INTENS CARDIAC REHAB W/EXERC: HCPCS | Performed by: INTERNAL MEDICINE

## 2025-01-22 NOTE — PROGRESS NOTES
Price Gilberto Sarai attended Intensive Cardiac Rehab today from 0800 to 1000. During his time he exercised and attended class.   His education today was a cooking school titled: Delicious Desserts. Patient received handouts and class discussion pertaining to the topic.

## 2025-01-23 ENCOUNTER — NON-PROVIDER VISIT (OUTPATIENT)
Dept: CARDIOLOGY | Facility: MEDICAL CENTER | Age: 80
End: 2025-01-23
Payer: MEDICARE

## 2025-01-23 DIAGNOSIS — I21.4 NSTEMI (NON-ST ELEVATED MYOCARDIAL INFARCTION) (HCC): ICD-10-CM

## 2025-01-23 PROCEDURE — G0422 INTENS CARDIAC REHAB W/EXERC: HCPCS | Performed by: INTERNAL MEDICINE

## 2025-01-23 PROCEDURE — G0423 INTENS CARDIAC REHAB NO EXER: HCPCS | Mod: 59 | Performed by: INTERNAL MEDICINE

## 2025-01-23 NOTE — PROGRESS NOTES
Price Moon attended Intensive Cardiac Rehab today from 0800 to 1000. During his time he exercised and attended class.   His education today was a workshop titled: Balance Training and Fall Prevention. Patient received handouts and class discussion pertaining to the topic.

## 2025-01-24 ENCOUNTER — APPOINTMENT (OUTPATIENT)
Dept: URBAN - METROPOLITAN AREA CLINIC 15 | Facility: CLINIC | Age: 80
Setting detail: DERMATOLOGY
End: 2025-01-24

## 2025-01-24 DIAGNOSIS — D22 MELANOCYTIC NEVI: ICD-10-CM

## 2025-01-24 DIAGNOSIS — L81.4 OTHER MELANIN HYPERPIGMENTATION: ICD-10-CM

## 2025-01-24 DIAGNOSIS — Z85.820 PERSONAL HISTORY OF MALIGNANT MELANOMA OF SKIN: ICD-10-CM

## 2025-01-24 DIAGNOSIS — L57.0 ACTINIC KERATOSIS: ICD-10-CM

## 2025-01-24 DIAGNOSIS — D18.0 HEMANGIOMA: ICD-10-CM

## 2025-01-24 DIAGNOSIS — L82.1 OTHER SEBORRHEIC KERATOSIS: ICD-10-CM

## 2025-01-24 DIAGNOSIS — Z71.89 OTHER SPECIFIED COUNSELING: ICD-10-CM

## 2025-01-24 PROBLEM — D22.5 MELANOCYTIC NEVI OF TRUNK: Status: ACTIVE | Noted: 2025-01-24

## 2025-01-24 PROBLEM — D22.72 MELANOCYTIC NEVI OF LEFT LOWER LIMB, INCLUDING HIP: Status: ACTIVE | Noted: 2025-01-24

## 2025-01-24 PROBLEM — D18.01 HEMANGIOMA OF SKIN AND SUBCUTANEOUS TISSUE: Status: ACTIVE | Noted: 2025-01-24

## 2025-01-24 PROBLEM — D22.71 MELANOCYTIC NEVI OF RIGHT LOWER LIMB, INCLUDING HIP: Status: ACTIVE | Noted: 2025-01-24

## 2025-01-24 PROBLEM — D22.61 MELANOCYTIC NEVI OF RIGHT UPPER LIMB, INCLUDING SHOULDER: Status: ACTIVE | Noted: 2025-01-24

## 2025-01-24 PROBLEM — D22.62 MELANOCYTIC NEVI OF LEFT UPPER LIMB, INCLUDING SHOULDER: Status: ACTIVE | Noted: 2025-01-24

## 2025-01-24 PROCEDURE — ? OBSERVATION

## 2025-01-24 PROCEDURE — 17003 DESTRUCT PREMALG LES 2-14: CPT

## 2025-01-24 PROCEDURE — ? LIQUID NITROGEN

## 2025-01-24 PROCEDURE — 99213 OFFICE O/P EST LOW 20 MIN: CPT | Mod: 25

## 2025-01-24 PROCEDURE — ? COUNSELING

## 2025-01-24 PROCEDURE — 17000 DESTRUCT PREMALG LESION: CPT

## 2025-01-24 ASSESSMENT — LOCATION DETAILED DESCRIPTION DERM
LOCATION DETAILED: LEFT SUPERIOR HELIX
LOCATION DETAILED: RIGHT SUPERIOR PARIETAL SCALP
LOCATION DETAILED: RIGHT VENTRAL PROXIMAL FOREARM
LOCATION DETAILED: RIGHT SUPERIOR HELIX
LOCATION DETAILED: EPIGASTRIC SKIN
LOCATION DETAILED: LEFT VENTRAL DISTAL FOREARM
LOCATION DETAILED: LEFT ANTECUBITAL SKIN
LOCATION DETAILED: LEFT PROXIMAL CALF
LOCATION DETAILED: RIGHT INFERIOR FOREHEAD
LOCATION DETAILED: RIGHT POPLITEAL SKIN
LOCATION DETAILED: LEFT ANTERIOR DISTAL UPPER ARM
LOCATION DETAILED: RIGHT TRAGUS
LOCATION DETAILED: LEFT POPLITEAL SKIN
LOCATION DETAILED: RIGHT PROXIMAL CALF
LOCATION DETAILED: LEFT SUPERIOR MEDIAL MIDBACK
LOCATION DETAILED: PERIUMBILICAL SKIN
LOCATION DETAILED: RIGHT INFERIOR UPPER BACK
LOCATION DETAILED: RIGHT VENTRAL DISTAL FOREARM
LOCATION DETAILED: RIGHT DISTAL POSTERIOR THIGH
LOCATION DETAILED: LEFT VENTRAL DISTAL FOREARM
LOCATION DETAILED: RIGHT ANTERIOR PROXIMAL UPPER ARM
LOCATION DETAILED: LEFT MEDIAL FOREHEAD
LOCATION DETAILED: RIGHT INFERIOR MEDIAL MIDBACK
LOCATION DETAILED: RIGHT MEDIAL INFERIOR CHEST
LOCATION DETAILED: INFERIOR THORACIC SPINE
LOCATION DETAILED: LEFT TRAGUS
LOCATION DETAILED: RIGHT RADIAL DORSAL HAND
LOCATION DETAILED: LEFT SCAPHA
LOCATION DETAILED: RIGHT ANTERIOR DISTAL UPPER ARM
LOCATION DETAILED: RIGHT MEDIAL UPPER BACK
LOCATION DETAILED: XIPHOID
LOCATION DETAILED: LEFT MEDIAL SUPERIOR CHEST
LOCATION DETAILED: LEFT DISTAL POSTERIOR THIGH
LOCATION DETAILED: LEFT RADIAL DORSAL HAND

## 2025-01-24 ASSESSMENT — LOCATION ZONE DERM
LOCATION ZONE: SCALP
LOCATION ZONE: LEG
LOCATION ZONE: FACE
LOCATION ZONE: ARM
LOCATION ZONE: ARM
LOCATION ZONE: TRUNK
LOCATION ZONE: HAND
LOCATION ZONE: EAR

## 2025-01-24 ASSESSMENT — LOCATION SIMPLE DESCRIPTION DERM
LOCATION SIMPLE: LEFT CALF
LOCATION SIMPLE: ABDOMEN
LOCATION SIMPLE: LEFT FOREHEAD
LOCATION SIMPLE: RIGHT LOWER BACK
LOCATION SIMPLE: LEFT EAR
LOCATION SIMPLE: LEFT FOREARM
LOCATION SIMPLE: RIGHT FOREHEAD
LOCATION SIMPLE: RIGHT FOREARM
LOCATION SIMPLE: RIGHT POPLITEAL SKIN
LOCATION SIMPLE: LEFT POSTERIOR THIGH
LOCATION SIMPLE: LEFT HAND
LOCATION SIMPLE: SCALP
LOCATION SIMPLE: CHEST
LOCATION SIMPLE: RIGHT HAND
LOCATION SIMPLE: LEFT POPLITEAL SKIN
LOCATION SIMPLE: UPPER BACK
LOCATION SIMPLE: LEFT FOREARM
LOCATION SIMPLE: LEFT LOWER BACK
LOCATION SIMPLE: LEFT ELBOW
LOCATION SIMPLE: LEFT UPPER ARM
LOCATION SIMPLE: RIGHT UPPER ARM
LOCATION SIMPLE: RIGHT CALF
LOCATION SIMPLE: RIGHT EAR
LOCATION SIMPLE: RIGHT UPPER BACK
LOCATION SIMPLE: RIGHT POSTERIOR THIGH

## 2025-01-28 ENCOUNTER — NON-PROVIDER VISIT (OUTPATIENT)
Dept: CARDIOLOGY | Facility: MEDICAL CENTER | Age: 80
End: 2025-01-28
Payer: MEDICARE

## 2025-01-28 DIAGNOSIS — I21.4 NSTEMI (NON-ST ELEVATED MYOCARDIAL INFARCTION) (HCC): ICD-10-CM

## 2025-01-28 NOTE — PROGRESS NOTES
Price Moon attended Intensive Cardiac Rehab today from 0800 to 1000. During his time he exercised and attended class.   His education today was a workshop titled: Healthy Sleep. Patient received handouts and class discussion pertaining to the topic.       [facility]

## 2025-01-29 ENCOUNTER — NON-PROVIDER VISIT (OUTPATIENT)
Dept: CARDIOLOGY | Facility: MEDICAL CENTER | Age: 80
End: 2025-01-29
Payer: MEDICARE

## 2025-01-29 DIAGNOSIS — I21.4 NSTEMI (NON-ST ELEVATED MYOCARDIAL INFARCTION) (HCC): ICD-10-CM

## 2025-01-29 NOTE — PROGRESS NOTES
Price Moon attended Intensive Cardiac Rehab today from 0800 to 1000. During his time he exercised and attended class.   His education today was a COOKING WORKSHOP titled: SAVORY SOUPS.. Patient received handouts and class discussion pertaining to the topic.

## 2025-01-30 ENCOUNTER — NON-PROVIDER VISIT (OUTPATIENT)
Dept: CARDIOLOGY | Facility: MEDICAL CENTER | Age: 80
End: 2025-01-30
Payer: MEDICARE

## 2025-01-30 DIAGNOSIS — I21.4 NSTEMI (NON-ST ELEVATED MYOCARDIAL INFARCTION) (HCC): ICD-10-CM

## 2025-01-30 NOTE — PROGRESS NOTES
Price Moon attended Intensive Cardiac Rehab today from 0800 to 1000. During his time he exercised and attended class.   His education today was a VIDEO titled: NUTRITION ACTION PLAN. Patient received handouts and class discussion pertaining to the topic.

## 2025-02-04 ENCOUNTER — NON-PROVIDER VISIT (OUTPATIENT)
Dept: CARDIOLOGY | Facility: MEDICAL CENTER | Age: 80
End: 2025-02-04
Payer: MEDICARE

## 2025-02-04 DIAGNOSIS — I21.4 NSTEMI (NON-ST ELEVATED MYOCARDIAL INFARCTION) (HCC): ICD-10-CM

## 2025-02-04 PROCEDURE — G0422 INTENS CARDIAC REHAB W/EXERC: HCPCS | Performed by: INTERNAL MEDICINE

## 2025-02-04 PROCEDURE — G0423 INTENS CARDIAC REHAB NO EXER: HCPCS | Mod: 59 | Performed by: INTERNAL MEDICINE

## 2025-02-04 NOTE — PROGRESS NOTES
Price Moon attended Intensive Cardiac Rehab today from 0800 to 1000. During his time he exercised and attended class.   His education today was a video titled: Diseases of Our Time. Patient received handouts and class discussion pertaining to the topic.

## 2025-02-05 ENCOUNTER — NON-PROVIDER VISIT (OUTPATIENT)
Dept: CARDIOLOGY | Facility: MEDICAL CENTER | Age: 80
End: 2025-02-05
Payer: MEDICARE

## 2025-02-05 DIAGNOSIS — I21.4 NSTEMI (NON-ST ELEVATED MYOCARDIAL INFARCTION) (HCC): ICD-10-CM

## 2025-02-05 PROCEDURE — G0422 INTENS CARDIAC REHAB W/EXERC: HCPCS | Performed by: INTERNAL MEDICINE

## 2025-02-05 PROCEDURE — G0423 INTENS CARDIAC REHAB NO EXER: HCPCS | Mod: 59 | Performed by: INTERNAL MEDICINE

## 2025-02-05 NOTE — PROGRESS NOTES
Price Gilberto Sarai attended Intensive Cardiac Rehab today from 0800 to 1000. During his time he exercised and attended class.   His education today was a COOKING SCHOOL titled: MEALS IN A SNAP. Patient received handouts and class discussion pertaining to the topic.

## 2025-02-06 ENCOUNTER — NON-PROVIDER VISIT (OUTPATIENT)
Dept: CARDIOLOGY | Facility: MEDICAL CENTER | Age: 80
End: 2025-02-06
Payer: MEDICARE

## 2025-02-06 DIAGNOSIS — I21.4 NSTEMI (NON-ST ELEVATED MYOCARDIAL INFARCTION) (HCC): ICD-10-CM

## 2025-02-06 PROCEDURE — G0422 INTENS CARDIAC REHAB W/EXERC: HCPCS | Performed by: INTERNAL MEDICINE

## 2025-02-06 PROCEDURE — G0423 INTENS CARDIAC REHAB NO EXER: HCPCS | Mod: 59 | Performed by: INTERNAL MEDICINE

## 2025-02-06 NOTE — PROGRESS NOTES
Price Moon attended Intensive Cardiac Rehab today from 0800 to 1000. During his time he exercised and attended class.   His education today WORKSHOP titled: TARGETING NUTRITIONAL PRIORITIES. Patient received handouts and class discussion pertaining to the topic.

## 2025-02-11 ENCOUNTER — NON-PROVIDER VISIT (OUTPATIENT)
Dept: CARDIOLOGY | Facility: MEDICAL CENTER | Age: 80
End: 2025-02-11
Payer: MEDICARE

## 2025-02-11 DIAGNOSIS — I21.4 NSTEMI (NON-ST ELEVATED MYOCARDIAL INFARCTION) (HCC): ICD-10-CM

## 2025-02-11 PROCEDURE — G0423 INTENS CARDIAC REHAB NO EXER: HCPCS | Mod: 59 | Performed by: INTERNAL MEDICINE

## 2025-02-11 PROCEDURE — G0422 INTENS CARDIAC REHAB W/EXERC: HCPCS | Performed by: INTERNAL MEDICINE

## 2025-02-11 NOTE — PROGRESS NOTES
Price Moon attended Intensive Cardiac Rehab today from 0800 to 1000. During his time he exercised and attended class.   His education today was a VIDEO titled: HYPERTENSION AND HEART DISEASE. Patient received handouts and class discussion pertaining to the topic.

## 2025-02-12 ENCOUNTER — NON-PROVIDER VISIT (OUTPATIENT)
Dept: CARDIOLOGY | Facility: MEDICAL CENTER | Age: 80
End: 2025-02-12
Payer: MEDICARE

## 2025-02-12 DIAGNOSIS — I21.4 NSTEMI (NON-ST ELEVATED MYOCARDIAL INFARCTION) (HCC): ICD-10-CM

## 2025-02-12 PROCEDURE — G0422 INTENS CARDIAC REHAB W/EXERC: HCPCS | Performed by: INTERNAL MEDICINE

## 2025-02-12 PROCEDURE — G0423 INTENS CARDIAC REHAB NO EXER: HCPCS | Mod: 59 | Performed by: INTERNAL MEDICINE

## 2025-02-12 NOTE — PROGRESS NOTES
Price Gilberto Sarai attended Intensive Cardiac Rehab today from 0800 to 1000. During his time he exercised and attended class.   His education today was a cooking school titled: "[employer] Apps and Sides. Patient received handouts and class discussion pertaining to the topic.

## 2025-02-13 ENCOUNTER — APPOINTMENT (OUTPATIENT)
Dept: LAB | Facility: MEDICAL CENTER | Age: 80
End: 2025-02-13
Payer: MEDICARE

## 2025-02-13 ENCOUNTER — NON-PROVIDER VISIT (OUTPATIENT)
Dept: CARDIOLOGY | Facility: MEDICAL CENTER | Age: 80
End: 2025-02-13
Payer: MEDICARE

## 2025-02-13 DIAGNOSIS — I21.4 NSTEMI (NON-ST ELEVATED MYOCARDIAL INFARCTION) (HCC): ICD-10-CM

## 2025-02-13 PROCEDURE — G0423 INTENS CARDIAC REHAB NO EXER: HCPCS | Mod: 59 | Performed by: INTERNAL MEDICINE

## 2025-02-13 PROCEDURE — G0422 INTENS CARDIAC REHAB W/EXERC: HCPCS | Performed by: INTERNAL MEDICINE

## 2025-02-13 NOTE — PROGRESS NOTES
Price Moon attended Intensive Cardiac Rehab today from 0800 to 1000. During his time he exercised and attended class.   His education today was a video titled: Healthy Hearts, Bodies and Minds. Patient received handouts and class discussion pertaining to the topic.

## 2025-02-18 ENCOUNTER — NON-PROVIDER VISIT (OUTPATIENT)
Dept: CARDIOLOGY | Facility: MEDICAL CENTER | Age: 80
End: 2025-02-18
Payer: MEDICARE

## 2025-02-18 DIAGNOSIS — I21.4 NSTEMI (NON-ST ELEVATED MYOCARDIAL INFARCTION) (HCC): ICD-10-CM

## 2025-02-18 PROCEDURE — G0423 INTENS CARDIAC REHAB NO EXER: HCPCS | Mod: 59 | Performed by: INTERNAL MEDICINE

## 2025-02-18 PROCEDURE — G0422 INTENS CARDIAC REHAB W/EXERC: HCPCS | Performed by: INTERNAL MEDICINE

## 2025-02-18 NOTE — PROGRESS NOTES
Price Moon attended Intensive Cardiac Rehab today from 0800 to 1000. During his time he exercised and attended class.   His education today was a WORKSHOP titled: MANAGING HEART DISEASE: PART 1. Patient received handouts and class discussion pertaining to the topic.

## 2025-02-19 ENCOUNTER — NON-PROVIDER VISIT (OUTPATIENT)
Dept: CARDIOLOGY | Facility: MEDICAL CENTER | Age: 80
End: 2025-02-19
Payer: MEDICARE

## 2025-02-19 DIAGNOSIS — I21.4 NSTEMI (NON-ST ELEVATED MYOCARDIAL INFARCTION) (HCC): ICD-10-CM

## 2025-02-19 PROCEDURE — G0422 INTENS CARDIAC REHAB W/EXERC: HCPCS | Performed by: INTERNAL MEDICINE

## 2025-02-19 PROCEDURE — G0423 INTENS CARDIAC REHAB NO EXER: HCPCS | Mod: 59 | Performed by: INTERNAL MEDICINE

## 2025-02-19 NOTE — PROGRESS NOTES
Price Gilberto Sarai attended Intensive Cardiac Rehab today from 0800 to 1000. During his time he exercised and attended class.   His education today was a cooking school titled: Comforting Weekend Breakfasts. Patient received handouts and class discussion pertaining to the topic.

## 2025-02-20 ENCOUNTER — NON-PROVIDER VISIT (OUTPATIENT)
Dept: CARDIOLOGY | Facility: MEDICAL CENTER | Age: 80
End: 2025-02-20
Payer: MEDICARE

## 2025-02-20 DIAGNOSIS — I21.4 NSTEMI (NON-ST ELEVATED MYOCARDIAL INFARCTION) (HCC): ICD-10-CM

## 2025-02-20 PROCEDURE — G0423 INTENS CARDIAC REHAB NO EXER: HCPCS | Mod: 59 | Performed by: INTERNAL MEDICINE

## 2025-02-20 PROCEDURE — G0422 INTENS CARDIAC REHAB W/EXERC: HCPCS | Performed by: INTERNAL MEDICINE

## 2025-02-20 NOTE — PROGRESS NOTES
Price Moon attended Intensive Cardiac Rehab today from 0800 to 1000. During his time he exercised and attended class.   His education today was a workshop titled: Recognizing and Reducing Stress. Patient received handouts and class discussion pertaining to the topic.

## 2025-02-21 ENCOUNTER — APPOINTMENT (OUTPATIENT)
Dept: MEDICAL GROUP | Facility: MEDICAL CENTER | Age: 80
End: 2025-02-21
Payer: MEDICARE

## 2025-02-21 VITALS
HEART RATE: 74 BPM | OXYGEN SATURATION: 94 % | BODY MASS INDEX: 33.27 KG/M2 | TEMPERATURE: 97.4 F | SYSTOLIC BLOOD PRESSURE: 126 MMHG | WEIGHT: 232.37 LBS | HEIGHT: 70 IN | DIASTOLIC BLOOD PRESSURE: 72 MMHG

## 2025-02-21 DIAGNOSIS — E66.9 OBESITY (BMI 30-39.9): ICD-10-CM

## 2025-02-21 DIAGNOSIS — E78.5 DYSLIPIDEMIA: ICD-10-CM

## 2025-02-21 DIAGNOSIS — E03.4 HYPOTHYROIDISM DUE TO ACQUIRED ATROPHY OF THYROID: ICD-10-CM

## 2025-02-21 DIAGNOSIS — I71.21 ANEURYSM OF ASCENDING AORTA WITHOUT RUPTURE (HCC): ICD-10-CM

## 2025-02-21 DIAGNOSIS — Z95.820 S/P ANGIOPLASTY WITH STENT: ICD-10-CM

## 2025-02-21 DIAGNOSIS — I10 PRIMARY HYPERTENSION: ICD-10-CM

## 2025-02-21 DIAGNOSIS — M25.552 CHRONIC LEFT HIP PAIN: ICD-10-CM

## 2025-02-21 DIAGNOSIS — R73.03 PREDIABETES: ICD-10-CM

## 2025-02-21 DIAGNOSIS — G89.29 CHRONIC LEFT HIP PAIN: ICD-10-CM

## 2025-02-21 PROCEDURE — 3074F SYST BP LT 130 MM HG: CPT | Performed by: FAMILY MEDICINE

## 2025-02-21 PROCEDURE — 99214 OFFICE O/P EST MOD 30 MIN: CPT | Performed by: FAMILY MEDICINE

## 2025-02-21 PROCEDURE — 3078F DIAST BP <80 MM HG: CPT | Performed by: FAMILY MEDICINE

## 2025-02-21 ASSESSMENT — PATIENT HEALTH QUESTIONNAIRE - PHQ9: CLINICAL INTERPRETATION OF PHQ2 SCORE: 0

## 2025-02-21 ASSESSMENT — FIBROSIS 4 INDEX: FIB4 SCORE: 1.8

## 2025-02-22 NOTE — PROGRESS NOTES
Verbal consent was acquired by the patient to use S[employer] ambient listening note generation during this visit: YES    CC: Hypothyroidism follow-up    Assessment & Plan:     1. Dyslipidemia  Chronic, controlled with Crestor 20 mg daily, no s/e reported, will continue.    - CBC WITH DIFFERENTIAL; Future  - Comp Metabolic Panel; Future  - Lipid Profile; Future    2. Hypothyroidism due to acquired atrophy of thyroid  Chronic, controlled with levothyroxine 100 mcg daily, no s/e reported, will continue.      3. Obesity (BMI 30-39.9)  Body mass index is 33.34 kg/m².   - Patient identified as having weight management issue.  Appropriate orders and counseling given.     4. Primary hypertension  Chronic, controlled with losartan 25 mg daily, no s/e reported, will continue.    - CBC WITH DIFFERENTIAL; Future  - Comp Metabolic Panel; Future  - MICROALBUMIN CREAT RATIO URINE; Future    5. Prediabetes  A1c was 5.9 her prior labs.  Given history of heart disease, patient could benefit from SGLT2 inhibitor.  However, his insurance does not cover this medication.   - Dietary/lifestyle modification and weight loss    - HEMOGLOBIN A1C; Future    6. S/P angioplasty with stent_LAD_10/2024.  7. Aneurysm of ascending aorta without rupture (HCC)  Chronic, stable, controlled with aspirin 81 mg daily, Effient 10 mg daily, and Crestor 10 mg daily.   Patient is active and exercises regularly.  He feels well.  Denies any chest pain, shortness of breath, dyspnea on exertion, unusual pedal edema or weight gain.  -Continue aspirin, Effient, Crestor as directed.  -Follow-up with cardiology as directed  -Continue regular exercises as tolerated  -Avoid alcohol and tobacco    8.  Chronic left hip pain  Secondary to severe osteoarthritis.  Total hip replacement recommended, but patient declined.  Symptoms are manageable with regular exercises.  Patient takes meloxicam 7.5 mg daily as needed.  He also received steroid injection occasionally for  "severe pain.  He continue to follow-up with orthopedic surgeon.             Subjective:       HPI:     History of Present Illness  The patient presents for hip pain, ear wax, vitamin B12 deficiency, and cardiac issues.    - Patient is doing well.  He takes all medication as directed, no side effect reported.  - Recently saw cardiologist; next appointment in a year.  He continues to take aspirin, Effient, Crestor as directed.  He denies any chest pain with exercises.  No unusual weight gain or pedal edema.  -He is active and exercises regularly.  He swims several times per week.  Denies any chest pain, shortness of breath, dyspnea on exertion.  -He has osteoarthritis affecting multiple joints.  Symptoms are manageable with exercises and meloxicam 7.5 mg as needed.  -He is not interested in joint replacement            Current Outpatient Medications:     meloxicam (MOBIC) 7.5 MG Tab, Take 1 Tablet by mouth every day., Disp: 30 Tablet, Rfl: 1    levothyroxine (SYNTHROID) 100 MCG Tab, Take 1 Tablet by mouth every morning on an empty stomach., Disp: 90 Tablet, Rfl: 3    latanoprost (XALATAN) 0.005 % Solution, Administer 1 Drop into both eyes at bedtime. Instill 1 drop into both eyes every night at bedtime, Disp: , Rfl:     prasugrel (EFFIENT) 10 MG Tab, Take 1 Tablet by mouth every day., Disp: 90 Tablet, Rfl: 3    losartan (COZAAR) 25 MG Tab, Take 1 Tablet by mouth every day., Disp: 90 Tablet, Rfl: 3    rosuvastatin (CRESTOR) 20 MG Tab, Take 1 Tablet by mouth every evening., Disp: 90 Tablet, Rfl: 3    aspirin 81 MG EC tablet, Take 1 Tablet by mouth every day., Disp: 30 Tablet, Rfl: 0    Multiple Vitamins-Minerals (MULTIVITAMIN MEN PO), Take  by mouth., Disp: , Rfl:      Objective:     Exam:  /72 (BP Location: Right arm, Patient Position: Sitting, BP Cuff Size: Adult long)   Pulse 74   Temp 36.3 °C (97.4 °F) (Temporal)   Ht 1.778 m (5' 10\")   Wt 105 kg (232 lb 5.8 oz)   SpO2 94%   BMI 33.34 kg/m²  Body mass " index is 33.34 kg/m².    Constitutional: awake, alert, in no distress.  Skin: Warm, dry, good turgor, no rashes, bruises, ulcers in visible areas.  Eye: conjunctiva clear, lids neg for edema or lesions.  ENMT: TM and auditory canals wnl. Oral and nasal mucosa wnl. Lips without lesions, good dentition, oropharynx clear.  Neck: Trachea midline, no masses, no thyromegaly. No cervical or supraclavicular lymphadenopathy  Respiratory: Unlabored respiratory effort, lungs clear to auscultation, no wheezes, no rales.  Cardiovascular: Normal S1, S2, no murmur, no pedal edema.  Psych: Oriented x3, affect and mood wnl, intact judgement and insight.         Return in about 6 months (around 8/21/2025) for Multiple issues.          Please note that this dictation was created using voice recognition software. I have made every reasonable attempt to correct obvious errors, but I expect that there are errors of grammar and possibly content that I did not discover before finalizing the note.

## 2025-02-25 ENCOUNTER — NON-PROVIDER VISIT (OUTPATIENT)
Dept: CARDIOLOGY | Facility: MEDICAL CENTER | Age: 80
End: 2025-02-25
Payer: MEDICARE

## 2025-02-25 DIAGNOSIS — I21.4 NSTEMI (NON-ST ELEVATED MYOCARDIAL INFARCTION) (HCC): ICD-10-CM

## 2025-02-25 NOTE — PROGRESS NOTES
Price Moon attended Intensive Cardiac Rehab today from 0800 to 1000. During his time he exercised and attended class.   His education today was a video titled: Dining Out. Patient received handouts and class discussion pertaining to the topic.

## 2025-02-26 ENCOUNTER — NON-PROVIDER VISIT (OUTPATIENT)
Dept: CARDIOLOGY | Facility: MEDICAL CENTER | Age: 80
End: 2025-02-26
Payer: MEDICARE

## 2025-02-26 DIAGNOSIS — I21.4 NSTEMI (NON-ST ELEVATED MYOCARDIAL INFARCTION) (HCC): ICD-10-CM

## 2025-02-26 NOTE — PROGRESS NOTES
Price Moon attended Intensive Cardiac Rehab today from 0800 to 1000. During his time he exercised and attended class.   His education today was a cooking school titled: [employer]. Patient received handouts and class discussion pertaining to the topic.

## 2025-02-27 ENCOUNTER — APPOINTMENT (OUTPATIENT)
Dept: CARDIOLOGY | Facility: MEDICAL CENTER | Age: 80
End: 2025-02-27
Payer: MEDICARE

## 2025-02-27 DIAGNOSIS — I21.4 NSTEMI (NON-ST ELEVATED MYOCARDIAL INFARCTION) (HCC): ICD-10-CM

## 2025-02-27 NOTE — PROGRESS NOTES
Price Moon attended Intensive Cardiac Rehab today from 0800 to 1000. During his time he exercised and attended class.   His education today was a workshop titled: Dining Out . Patient received handouts and class discussion pertaining to the topic.

## 2025-03-04 ENCOUNTER — APPOINTMENT (OUTPATIENT)
Dept: CARDIOLOGY | Facility: MEDICAL CENTER | Age: 80
End: 2025-03-04
Payer: MEDICARE

## 2025-03-04 DIAGNOSIS — I21.4 NSTEMI (NON-ST ELEVATED MYOCARDIAL INFARCTION) (HCC): ICD-10-CM

## 2025-03-04 PROCEDURE — G0422 INTENS CARDIAC REHAB W/EXERC: HCPCS | Performed by: INTERNAL MEDICINE

## 2025-03-04 PROCEDURE — G0423 INTENS CARDIAC REHAB NO EXER: HCPCS | Mod: 59 | Performed by: INTERNAL MEDICINE

## 2025-03-04 NOTE — PROGRESS NOTES
Price Moon attended Intensive Cardiac Rehab today from 0800 to 1000. During his time he exercised and attended class.   His education today was a VIDEO titled: FACTS ON FATS. Patient received handouts and class discussion pertaining to the topic.

## 2025-03-05 ENCOUNTER — APPOINTMENT (OUTPATIENT)
Dept: CARDIOLOGY | Facility: MEDICAL CENTER | Age: 80
End: 2025-03-05
Payer: MEDICARE

## 2025-03-05 DIAGNOSIS — I21.4 NSTEMI (NON-ST ELEVATED MYOCARDIAL INFARCTION) (HCC): ICD-10-CM

## 2025-03-05 PROCEDURE — G0422 INTENS CARDIAC REHAB W/EXERC: HCPCS | Performed by: INTERNAL MEDICINE

## 2025-03-05 PROCEDURE — G0423 INTENS CARDIAC REHAB NO EXER: HCPCS | Mod: 59 | Performed by: INTERNAL MEDICINE

## 2025-03-05 NOTE — PROGRESS NOTES
Price Gilberto Sarai attended Intensive Cardiac Rehab today from 0800 to 1000. During his time he exercised and attended class.   His education today was a cooking school titled: Fast Evening Meals. Patient received handouts and class discussion pertaining to the topic.

## 2025-03-06 ENCOUNTER — APPOINTMENT (OUTPATIENT)
Dept: CARDIOLOGY | Facility: MEDICAL CENTER | Age: 80
End: 2025-03-06
Payer: MEDICARE

## 2025-04-23 ENCOUNTER — PATIENT MESSAGE (OUTPATIENT)
Dept: MEDICAL GROUP | Facility: MEDICAL CENTER | Age: 80
End: 2025-04-23
Payer: MEDICARE

## 2025-04-23 DIAGNOSIS — G89.29 CHRONIC LEFT HIP PAIN: ICD-10-CM

## 2025-04-23 DIAGNOSIS — M25.552 CHRONIC LEFT HIP PAIN: ICD-10-CM

## 2025-04-23 DIAGNOSIS — M79.605 LEFT LEG PAIN: ICD-10-CM

## 2025-04-29 NOTE — Clinical Note
REFERRAL APPROVAL NOTICE         Sent on April 29, 2025                   Price Moon  [id number]  [address] [place] [address]                   Dear Mr. Moon,    After a careful review of the medical information and benefit coverage, [facility] has processed your referral. See below for additional details.    If applicable, you must be actively enrolled with your insurance for coverage of the authorized service. If you have any questions regarding your coverage, please contact your insurance directly.    REFERRAL INFORMATION   Referral #:  [id number]  Referred-To Department    Referred-By Provider:  Physical Therapy    Shanda Lu M.D.   Phys Therapy [facility]      [address] [place] [address] [address]  [place] [id number] [phone] [address]  [address]  [place] [id number] [phone]    Referral Start Date:  04/25/2025  Referral End Date:   04/25/2026             SCHEDULING  If you do not already have an appointment, please call [phone] to make an appointment.     MORE INFORMATION  If you do not already have a hoozin account, sign up at: [email or web address]  You can access your medical information, make appointments, see lab results, billing information, and more.  If you have questions regarding this referral, please contact  the [facility] Referrals department at:             [phone]. Monday - Friday 8:00AM - 5:00PM.     Sincerely,    [facility]

## 2025-05-21 ENCOUNTER — PHYSICAL THERAPY (OUTPATIENT)
Dept: PHYSICAL THERAPY | Facility: MEDICAL CENTER | Age: 80
End: 2025-05-21
Attending: FAMILY MEDICINE
Payer: MEDICARE

## 2025-05-21 DIAGNOSIS — M79.605 LEFT LEG PAIN: ICD-10-CM

## 2025-05-21 DIAGNOSIS — G89.29 CHRONIC LEFT HIP PAIN: Primary | ICD-10-CM

## 2025-05-21 DIAGNOSIS — M25.552 CHRONIC LEFT HIP PAIN: Primary | ICD-10-CM

## 2025-05-21 PROCEDURE — 97110 THERAPEUTIC EXERCISES: CPT

## 2025-05-21 PROCEDURE — 97162 PT EVAL MOD COMPLEX 30 MIN: CPT

## 2025-05-21 ASSESSMENT — ENCOUNTER SYMPTOMS
PAIN SCALE: 7
PAIN SCALE AT LOWEST: 0

## 2025-05-21 NOTE — OP THERAPY EVALUATION
Outpatient Physical Therapy  INITIAL EVALUATION    [facility] Outpatient Physical Therapy  [address]  [place] [address]  Phone:  [phone]  Fax:  [phone]    Date of Evaluation: 05/21/2025    Patient: Price Moon  YOB: 1945  MRN: [id number]     Referring Provider: Shanda Lu M.D.  [address]  [address]  [place] [address]   Referring Diagnosis Pain in left leg [M79.605];Pain in left hip [M25.552];Other chronic pain [G89.29]     Time Calculation                 Chief Complaint: Hip Problem and Leg Problem    Visit Diagnoses     ICD-10-CM   1. Chronic left hip pain  M25.552    G89.29   2. Left leg pain  M79.605       Date of onset of impairment: No data found    Subjective:   History of Present Illness:     Date of surgery:  1/13/2013    Mechanism of injury:  Patient is a 79 year old male with a PMH including: CAD s/p LAD stent, aneurysm, BPH, pre-DM, hernia repair. Per prior PT notes, PMH includes reported sartorius injury/tear and ITB/glute injury. 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 Pt stating no imaging following this incident.    Pt presents to therapy with complaints of L hip pain. 2 previous encounters of PT in 2019 and 2023, which he reports were helpful. Pt reporting pain, instability, and locking; pain also described as sharp and shooting. Pain at L hip and upper thigh. Movement aggravates these symptoms. Pt stating knee locks during gait, and with mobilizing on ladder. Attempted martial arts can bring on the pain. Denies numbness/tingling in legs. Noticed he lost a lot of musculature in the calf, stating this was quite sudden. Pt seen by CARLY and told he would require a new hip surgery based on imaging. Pt's main concern is that a new hip wouldn't resolve the deficits at muscles.    Currently denies changes in bowel and bladder, saddle  anesthesia, significant weight changes, chills/night sweats, nausea and vomiting, and unexplained fatigue.       Pain:     Current pain ratin    At best pain ratin    Location:  Mid thigh to lateral knee, intermittent groin pain    Quality: sharp and shooting.    Progression:  Worsening    Pain Comments::  Aggravating: walking >1000 steps, standing in place, stairs, ladders     Relieving: doing own exercises  Diagnostic Tests:     Diagnostic Tests Comments:  L hip x-ray 23:  IMPRESSION:   An AP pelvis and AP and crosstable lateral of his left hip show end-stage   arthritic changes including loss of the joint space, osteophyte formation,   subchondral sclerosis and femoral head cysts     Lumbar MRI 19:  Level-specific findings as follows:     L1-2: There is mild broad posterior disc bulge. There is mild facet arthropathy. There is mild to moderate bilateral neural foraminal stenosis.  L2-3: There is moderate broad posterior endplate spurring. There is mild facet arthropathy and ligamentum flavum hypertrophy. There is moderate central canal stenosis. There is moderate bilateral neural foraminal stenosis.  L3-4: There is moderate broad posterior disc bulge and endplate spurring. There is moderate facet arthropathy and mildly mentum flavum hypertrophy. There is prominent dorsal epidural fat. There is moderate central canal stenosis. There is moderate   bilateral neural foraminal stenosis.  L4-5: There is moderate broad posterior disc bulge and endplate spurring. There is moderate to severe facet arthropathy. There is moderate bilateral neural foraminal stenosis.  L5-S1: There is moderate broad posterior disc bulge and endplate spurring. There is unroofing of the posterior aspect of the L5-S1 disc secondary to spondylolisthesis. There is severe facet arthropathy. There is moderate to severe right and moderate left   neural foraminal stenosis.     The visualized prevertebral and posterior paraspinous  soft tissues are grossly unremarkable.     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      Treatments:     Treatment Comments:  2 previous encounters of PT in 2019 and   Bud clifford   Activities of Daily Living:     Patient reported ADL status: Patient's current daily routine includes:  Work: Retired  Hobbies: martial arts and swimming  Exercise: Prev PT exercises, squatting, twisting, concentration on breathing     DME: SPC intermittently     Patient Goals:     Patient goals for therapy:  Decreased pain      Past Medical History[1]  Past Surgical History[2]  Social History     Tobacco Use    Smoking status: Former     Current packs/day: 0.00     Types: Cigarettes     Quit date: 1985     Years since quittin.3     Passive exposure: Past    Smokeless tobacco: Never   Substance Use Topics    Alcohol use: No     Alcohol/week: 0.0 oz     Social Hx - Family and Occupational[3]    Objective     Palpation     Additional Palpation Details  TTP L psoas and iliacus  No TTP l/s paraspinals     Active Range of Motion     Lumbar   Flexion: within functional limits (FT to toes)  Extension: decreased (mod restricted; tightness in anterior groin)  Left lateral flexion: decreased  Right lateral flexion: decreased  Left rotation: decreased  Right rotation: decreased    Additional Active Range of Motion Details  No pain with movement     Passive Range of Motion     Additional Passive Range of Motion Details  Slightly limited hip IR on R; ER WFL  ER decreased with IR significantly limited on LLE  *tested in 90/90 PROM     Joint Play   Spine     Central PA [place]        L1: hypomobile       L2: hypomobile       L3: hypomobile       L4: hypomobile       L5: hypomobile       S1: hypomobile      Strength:      Left Hip   Planes of Motion   Abduction: 3+    Right Hip   Planes of Motion   Abduction: 4-    Additional Strength Details  Bridge: 3/4 ROM -limited due to tightness  at anterior hips  SL bridge: visible pelvic drop L more impaired than R    General Comments     Hip Comments   Trendelenburg gait        Therapeutic Exercises (CPT 76344):     1. pt education, re: PT exam findings and upcoming POC    2. new hep as below      Therapeutic Exercise Summary: Access Code: [email or web address]  URL: [email or web address]  Date: 05/21/2025  Prepared by: Mitch Miguel    Exercises  - Clam with Resistance  - 3-4 x weekly - 3 sets - 15 reps - 3-5 sec hold  - Bridge with Resistance  - 3-4 x weekly - 3 sets - 15 reps - 3-5 sec hold    Pt performed these exercises with instruction and SPV.  Provided handout with these exercises for daily HEP.          Time-based treatments/modalities:    Physical Therapy Timed Treatment Charges  Therapeutic exercise minutes (CPT 47544): 12 minutes      Assessment, Response and Plan:   Impairments: abnormal or restricted ROM, activity intolerance, impaired physical strength, lacks appropriate home exercise program, limited ADL's and pain with function    Assessment details:  Patient is a pleasant and cooperative 79 year old male who presents to therapy with c/o chronic L hip pain, including pain at groin and mid to lateral thigh. Has attempted PT in the past with improvements. Seen by orthopedics with recommendations for surgical management. Imaging of hip remarkable for end-range arthritic changes. Pt stating his hesitations due to muscle damage at thigh (no imaging yet). Exam findings suggestive of limitations in hip ROM L>R, proximal pelvic girdle and core weakness, abnormal gait mechanics. Pt may benefit from skilled physical therapy in order to address above impairments in order to improve QOL and return to reported ADL's. Prognosis guarded due to significant degenerative changes at hip. Discussion with pt that he may additionally benefit from imaging at thigh to determine extent of any muscle damage.    Prognosis comment:  Fair/poor  Goals:   Short Term  Goals:   1. Pt will be independent with written HEP.      Short term goal time span:  2-4 weeks      Long Term Goals:    1. Pt will be independent with written HEP.  2. Pt will have a sig improvement in LEFS score (eval: 58.75)  3. Pt will demo consistent improvements in gait including trunk rotation, arm swing and LRAD to help un-weight hip.  4. Pt will be able to stand for at least 20 min without incr in hip pain with min to no modifications to complete his ADL's.       Long term goal time span:  6-8 weeks    Plan:   Therapy options:  Physical therapy treatment to continue  Planned therapy interventions:  Neuromuscular Re-education (CPT 37247), Gait Training (CPT 21874), Manual Therapy (CPT 15985), E Stim Unattended (CPT 83483), Therapeutic Exercise (CPT 49507) and Therapeutic Activities (CPT 47437)  Frequency: 1-2x/wk.  Duration in weeks:  8  Discussed with:  Patient  Plan details:  UPOC: 7/14/25          Functional Assessment Used  Lower Extremity Functional Scale Percentage: 58.75     Referring provider co-signature:  I have reviewed this plan of care and my co-signature certifies the need for services.    Certification Period: 05/21/2025 to  7/14/25    Physician Signature: ________________________________ Date: ______________                      [1]   Past Medical History:  Diagnosis Date    BPH (benign prostatic hyperplasia)     CAD (coronary artery disease)     Hyperlipidemia     Hypertension     Thyroid disease    [2]   Past Surgical History:  Procedure Laterality Date    ANGIOPLASTY      EYE SURGERY      HERNIA REPAIR      ZZZ CARDIAC CATH     [3]   Family and Occupational History  Socioeconomic History    Marital status:      Spouse name: Not on file    Number of children: Not on file    Years of education: Not on file    Highest education level: Bachelor's degree (e.g., BA, AB, BS)   Occupational History    Not on file

## 2025-05-27 ENCOUNTER — PHYSICAL THERAPY (OUTPATIENT)
Dept: PHYSICAL THERAPY | Facility: MEDICAL CENTER | Age: 80
End: 2025-05-27
Attending: FAMILY MEDICINE
Payer: MEDICARE

## 2025-05-27 DIAGNOSIS — M79.605 LEFT LEG PAIN: ICD-10-CM

## 2025-05-27 DIAGNOSIS — G89.29 CHRONIC LEFT HIP PAIN: Primary | ICD-10-CM

## 2025-05-27 DIAGNOSIS — M25.552 CHRONIC LEFT HIP PAIN: Primary | ICD-10-CM

## 2025-05-27 PROCEDURE — 97140 MANUAL THERAPY 1/> REGIONS: CPT

## 2025-05-27 PROCEDURE — 97110 THERAPEUTIC EXERCISES: CPT

## 2025-05-27 NOTE — OP THERAPY DAILY TREATMENT
Outpatient Physical Therapy  DAILY TREATMENT     [facility] Outpatient Physical Therapy  [address]  [place] [address]  Phone:  [phone]  Fax:  [phone]    Date: 05/27/2025    Patient: Price Moon  YOB: 1945  MRN: [id number]     Time Calculation    Start time: 0900  Stop time: 0942 Time Calculation (min): 42 minutes         Chief Complaint: Hip Problem    Visit #: 2    SUBJECTIVE:  Pt stating he has some pain at lateral knee and lateral hip pain. Stating he has been working every day.       OBJECTIVE:  Current objective measures:     From eval:  Additional Strength Details  Bridge: 3/4 ROM -limited due to tightness at anterior hips  SL bridge: visible pelvic drop L more impaired than R    Trendelenburg gait     Passive Range of Motion Details  Slightly limited hip IR on R; ER WFL  ER decreased with IR significantly limited on LLE  *tested in 90/90 PROM     Therapeutic Exercises (CPT 97001):     1. recumbent bike, x3 min, cardiovascular warm up    2. new hep as below    3. clamshells, verbal review    4. resisted bridge, verbal review    5. ball bridge, 2x2 min, 3/4 AROM with fatigue second round; hep    6. adductor stretch, x15 ea standing at bar, hep    7. hip abd isometrics at wall, x40 sec ea, incr challenge during stance on L; hep    8. gait training, x3 min, VC's for incr trunk rotation and arm swing; tactile at shoulders with improvements      Therapeutic Exercise Summary: Access Code: [email or web address]  URL: [email or web address]  Date: 05/21/2025  Prepared by: Mitch Miguel    Exercises  - Clam with Resistance  - 3-4 x weekly - 3 sets - 15 reps - 3-5 sec hold  - Bridge with Resistance  - 3-4 x weekly - 3 sets - 15 reps - 3-5 sec hold    Pt performed these exercises with instruction and SPV.  Provided handout with these exercises for daily HEP.        Therapeutic Treatments and Modalities:     1. Manual Therapy (CPT 05415), see below,  x8 min    Therapeutic Treatment and Modalities Summary: Manual:   STM to psoas and iliacus  PROM L hip abduction   STM to adductors //improved mobility and decr pain with amb    Time-based treatments/modalities:    Physical Therapy Timed Treatment Charges  Manual therapy minutes (CPT 36354): 8 minutes  Therapeutic exercise minutes (CPT 03255): 34 minutes      Pain rating (1-10) before treatment:  5-6/10 L lateral hip and lateral knee  Pain rating (1-10) after treatment:  3-4 L groin      ASSESSMENT:   Response to treatment:   Decrease in pain and change in location from lateral hip and knee to groin by end of session. Good tolerance to manual with noted improvements in flexibility following manual to adductors. Difficulties with contralateral limb coordination and maintaining trunk rotation during gait; may benefit from review in follow ups.       PLAN/RECOMMENDATIONS:   Plan for treatment: therapy treatment to continue next visit.  Planned interventions for next visit: continue with current treatment. Add manual to joint; review gait

## 2025-06-02 ENCOUNTER — APPOINTMENT (OUTPATIENT)
Dept: PHYSICAL THERAPY | Facility: MEDICAL CENTER | Age: 80
End: 2025-06-02
Attending: FAMILY MEDICINE
Payer: MEDICARE

## 2025-06-03 ENCOUNTER — APPOINTMENT (OUTPATIENT)
Dept: PHYSICAL THERAPY | Facility: MEDICAL CENTER | Age: 80
End: 2025-06-03
Payer: MEDICARE

## 2025-06-04 ENCOUNTER — PHYSICAL THERAPY (OUTPATIENT)
Dept: PHYSICAL THERAPY | Facility: MEDICAL CENTER | Age: 80
End: 2025-06-04
Attending: FAMILY MEDICINE
Payer: MEDICARE

## 2025-06-04 DIAGNOSIS — M25.552 CHRONIC LEFT HIP PAIN: Primary | ICD-10-CM

## 2025-06-04 DIAGNOSIS — G89.29 CHRONIC LEFT HIP PAIN: Primary | ICD-10-CM

## 2025-06-04 PROCEDURE — 97110 THERAPEUTIC EXERCISES: CPT

## 2025-06-04 PROCEDURE — 97140 MANUAL THERAPY 1/> REGIONS: CPT

## 2025-06-04 NOTE — OP THERAPY DAILY TREATMENT
Outpatient Physical Therapy  DAILY TREATMENT     [facility] Outpatient Physical Therapy  [address]  [place] [address]  Phone:  [phone]  Fax:  [phone]    Date: 06/04/2025    Patient: Price Moon  YOB: 1945  MRN: [id number]     Time Calculation    Start time: 0901  Stop time: 0945 Time Calculation (min): 44 minutes         Chief Complaint: Hip Problem    Visit #: 3    SUBJECTIVE:  Pt stating he felt like he had a miracle cure x2 days and then returned to prior s/s. Thought there was a miracle cure initially.          OBJECTIVE:  Current objective measures:     From eval:  Additional Strength Details  Bridge: 3/4 ROM -limited due to tightness at anterior hips  SL bridge: visible pelvic drop L more impaired than R    Trendelenburg gait         Therapeutic Exercises (CPT 15039):     1. recumbent bike, x5 min, cardiovascular warm up    2. new hep as below    3. clamshells, NT    4. resisted bridge, NT    5. ball bridge, 2x2 min, 3/4 AROM with fatigue second round; hep    6. adductor stretch, x15 ea standing at bar, hep    7. hip abd isometrics at wall, x40 sec ea, incr challenge during stance on L; hep    8. gait training, x1 min, reviewed; improved trunk rotation, decr timing coordination of contralateral limbs    9. true stretch-hip flexor stretch, 30x ea, increase to 3-4/10 discomfort (improved knee pain but with soreness throughout leg post ex)      Therapeutic Exercise Summary: Access Code: [email or web address]  URL: [email or web address]  Date: 05/21/2025  Prepared by: Mitch Miguel    Exercises  - Clam with Resistance  - 3-4 x weekly - 3 sets - 15 reps - 3-5 sec hold  - Bridge with Resistance  - 3-4 x weekly - 3 sets - 15 reps - 3-5 sec hold    Pt performed these exercises with instruction and SPV.  Provided handout with these exercises for daily HEP.        Therapeutic Treatments and Modalities:     1. Manual Therapy (CPT 83315), see  below, x21 min    Therapeutic Treatment and Modalities Summary: Manual:   STM to psoas and iliacus  PROM L hip abduction   STM to adductors and TFL L  LAD  Lateral hip glides with belt  PA mobs L hip gr III (4 rounds 30 reps)  OP with manual hip IR and ER //initial soreness that improved with incr reps         Time-based treatments/modalities:    Physical Therapy Timed Treatment Charges  Manual therapy minutes (CPT 24099): 21 minutes  Therapeutic exercise minutes (CPT 21465): 23 minutes      Pain rating (1-10) before treatment:  5-6/10 L lateral hip and lateral knee  Pain rating (1-10) after treatment:  3-4; fatigued      ASSESSMENT:   Response to treatment:   Pos response to manual directed at hip with return of s/s following repeated hip flexor stretching in true stretch; will be mindful of avoiding end-range hip flexion as this seemed to aggravate s/s by end of visit.   Will repeat manual to joint and adductors with strength follow up.    PLAN/RECOMMENDATIONS:   Plan for treatment: therapy treatment to continue next visit.  Planned interventions for next visit: continue with current treatment. Add manual to joint; review gait

## 2025-06-10 ENCOUNTER — PHYSICAL THERAPY (OUTPATIENT)
Dept: PHYSICAL THERAPY | Facility: MEDICAL CENTER | Age: 80
End: 2025-06-10
Attending: FAMILY MEDICINE
Payer: MEDICARE

## 2025-06-10 DIAGNOSIS — M25.552 CHRONIC LEFT HIP PAIN: Primary | ICD-10-CM

## 2025-06-10 DIAGNOSIS — M79.605 LEFT LEG PAIN: ICD-10-CM

## 2025-06-10 DIAGNOSIS — G89.29 CHRONIC LEFT HIP PAIN: Primary | ICD-10-CM

## 2025-06-10 PROCEDURE — 97012 MECHANICAL TRACTION THERAPY: CPT

## 2025-06-10 PROCEDURE — 97110 THERAPEUTIC EXERCISES: CPT

## 2025-06-10 NOTE — OP THERAPY DAILY TREATMENT
Outpatient Physical Therapy  DAILY TREATMENT     [facility] Outpatient Physical Therapy  [address]  [place] [address]  Phone:  [phone]  Fax:  [phone]    Date: 06/10/2025    Patient: Price Moon  YOB: 1945  MRN: [id number]     Time Calculation    Start time: 1632              Chief Complaint: Hip Problem    Visit #: 4    SUBJECTIVE:  Pt stating the adjustment at last session was helpful in putting the hip back where it should be. Had two days of pain relief before the s/s returned. Stating that the knee and hip will alternate with pain. Today is experiencing lateral knee pain. Pt will occasionally notice pain that extends down the length of the leg to the lateral L foot; this occurs intermittently.         OBJECTIVE:  Current objective measures:   L/s AROM: restricted side bending with pain to L and restricted extension    Slump: negative  SLR: negative  Femoral test: negative     Trendelenburg gait         Therapeutic Exercises (CPT 75178):     1. recumbent bike, x5 min, cardiovascular warm up    3. clamshells, NT    4. resisted bridge, NT    5. ball bridge, 2x2 min, NT    6. adductor stretch, x15 ea standing at bar, NT    7. hip abd isometrics at wall, x40 sec ea, NT    8. gait training, x1 min, NT    9. true stretch-hip flexor stretch, 30x ea, NT    10. toy soldier, OTB 3 rounds to fatigue, fatigue only; decr WB on LLE; recovery x 1 min in b/w reps; hep    11. prone knee flexion, x30, hep    12. hooklying->seated sciatic nerve glides, x15, discomfort in hip; modified to sitting with improved tolerance; hep    20. UPOC: 6/21/25      Therapeutic Exercise Summary: Access Code: [email or web address]  URL: [email or web address]  Date: 06/10/2025  Prepared by: Mitch Miguel    Exercises  - Clam with Resistance  - 3-4 x weekly - 3 sets - 15 reps - 3-5 sec hold  - Bridge with Resistance  - 3-4 x weekly - 3 sets - 15 reps - 3-5 sec hold  - Lateral  Lunge Adductor Stretch with Counter Support  - 2 x daily - 7 x weekly - 2-3 sets - 20-30 reps  - Bridge with Arms at Sides and Feet on Swiss Ball  - 1-2 x daily - 7 x weekly - 2-3 sets - 1-2 min hold  - Isometric Gluteus Medius at Wall  - 1-2 x daily - 7 x weekly - 2-3 sets - 30 sec hold  - Seated Sciatic Nerve Glide With Cervical Motion  - 3 x daily - 7 x weekly - 1-2 sets - 10 reps  - Prone Knee Flexion AROM  - 2 x daily - 7 x weekly - 2-3 sets - 10 reps    Pt performed these exercises with instruction and SPV.  Provided handout with these exercises for daily HEP.        Therapeutic Treatments and Modalities:     1. Manual Therapy (CPT 16569), see below, x6 min    2. Mechanical Traction (CPT 50527), 115/55# mech l/s traction with mhp x15 min, pain-free with amb upon leaving clinic    Therapeutic Treatment and Modalities Summary: Manual:   Educated pt re: side effects from cupping including bruising, erythema, swelling, tenderness that may last several days. Educated pt re: purpose of treatment. Pt verbalized consent to treat.   Pt draped appropriately with 2 cups applied to distal insertion at ITB x5 min duration //slight erythema, no adverse response            Time-based treatments/modalities:           Pain rating (1-10) before treatment:  5-6/10 L lateral knee  Pain rating (1-10) after treatment:  ; fatigued      ASSESSMENT:   Response to treatment:   Trial of mech traction for l/s based on subjective complaints of radiating s/s despite negative neural tension findings in clinic; will assess response in follow ups. Able to abolish pain in session; pt reporting improvements x2 days following PT but no maintained and consistent improvements. Will continue to challenge pelvic girdle strength and endurance, shorty in frontal plane in hopes that this will maintain improvements.     PLAN/RECOMMENDATIONS:   Plan for treatment: therapy treatment to continue next visit.  Planned interventions for next visit: continue with  current treatment. [facility] updated hep [facility]

## 2025-06-17 ENCOUNTER — PHYSICAL THERAPY (OUTPATIENT)
Dept: PHYSICAL THERAPY | Facility: MEDICAL CENTER | Age: 80
End: 2025-06-17
Attending: FAMILY MEDICINE
Payer: MEDICARE

## 2025-06-17 DIAGNOSIS — M25.552 CHRONIC LEFT HIP PAIN: Primary | ICD-10-CM

## 2025-06-17 DIAGNOSIS — G89.29 CHRONIC LEFT HIP PAIN: Primary | ICD-10-CM

## 2025-06-17 PROCEDURE — 97110 THERAPEUTIC EXERCISES: CPT

## 2025-06-17 PROCEDURE — 97140 MANUAL THERAPY 1/> REGIONS: CPT

## 2025-06-17 NOTE — OP THERAPY DAILY TREATMENT
Outpatient Physical Therapy  DAILY TREATMENT     [facility] Outpatient Physical Therapy  [address]  [place] [address]  Phone:  [phone]  Fax:  [phone]    Date: 06/17/2025    Patient: Price Moon  YOB: 1945  MRN: [id number]     Time Calculation    Start time: 1635  Stop time: 1728 Time Calculation (min): 53 minutes         Chief Complaint: Hip Problem    Visit #: 5    SUBJECTIVE:  Pt stating he may have overdone the exercises, completed a few too many quad stretches in the pool. Noticed no s/s during but afterwards had an increase in pain.       OBJECTIVE:  Current objective measures:   L/s AROM: restricted side bending with pain to L and restricted extension    Slump: negative  SLR: negative  Femoral test: negative     Trendelenburg gait     TTP hip adductor     Therapeutic Exercises (CPT 96766):     1. recumbent bike, x5 min, cardiovascular warm up    3. clamshells, NT    4. resisted bridge, NT    5. ball bridge, one round to fatigue, 4 min 36 sec; intermittent cuing for full AROM    6. adductor stretch, x15 ea standing at bar, NT    7. hip abd isometrics at wall, x40 sec ea, NT    8. gait training, x1 min, NT    9. true stretch-hip flexor stretch, 30x ea, NT    10. toy soldier, OTB 3 rounds to fatigue, verbal review    11. prone knee flexion, x30, reviewed    12. hooklying->seated sciatic nerve glides, x15, NT    20. UPOC: 6/21/25      Therapeutic Exercise Summary: Access Code: [email or web address]  URL: [email or web address]  Date: 06/10/2025  Prepared by: Mitch Miguel    Exercises  - Clam with Resistance  - 3-4 x weekly - 3 sets - 15 reps - 3-5 sec hold  - Bridge with Resistance  - 3-4 x weekly - 3 sets - 15 reps - 3-5 sec hold  - Lateral Lunge Adductor Stretch with Counter Support  - 2 x daily - 7 x weekly - 2-3 sets - 20-30 reps  - Bridge with Arms at Sides and Feet on Swiss Ball  - 1-2 x daily - 7 x weekly - 2-3 sets - 1-2 min hold  -  Isometric Gluteus Medius at Wall  - 1-2 x daily - 7 x weekly - 2-3 sets - 30 sec hold  - Seated Sciatic Nerve Glide With Cervical Motion  - 3 x daily - 7 x weekly - 1-2 sets - 10 reps  - Prone Knee Flexion AROM  - 2 x daily - 7 x weekly - 2-3 sets - 10 reps    Pt performed these exercises with instruction and SPV.  Provided handout with these exercises for daily HEP.        Therapeutic Treatments and Modalities:     1. Manual Therapy (CPT 85456), x28 min    2. E Stim Unattended (CPT 66948), IFC and mhp to adductor x15 min    Therapeutic Treatment and Modalities Summary: Manual to L hip:  IASTM to L quad and adductor; STM to L adductor  L hip PROM into abd  SL hip flexor/quad stretching  //improved s/s with repeat amb    Time-based treatments/modalities:    Physical Therapy Timed Treatment Charges  Manual therapy minutes (CPT 51398): 28 minutes  Therapeutic exercise minutes (CPT 38647): 10 minutes      Pain rating (1-10) before treatment:  6-7/10 L lateral knee  Pain rating (1-10) after treatment:  4-5/10 L lateral knee after manual and ex; 2/10 after TENS        ASSESSMENT:   Response to treatment:   Minor improvements in quad discomfort following manual today and review of ex.  No improvement with l/s mech traction, therefore DC-ed. Did experience sig pain relief following TENs therefore education re: its' use and HO provided for home unit to manage pain from non-medicinal standpoint. Will emphasize combo of WB and NWB strengthening and stability program in follow ups.      PLAN/RECOMMENDATIONS:   Plan for treatment: therapy treatment to continue next visit.  Planned interventions for next visit: continue with current treatment.  PN due next session

## 2025-07-02 ENCOUNTER — PHYSICAL THERAPY (OUTPATIENT)
Dept: PHYSICAL THERAPY | Facility: MEDICAL CENTER | Age: 80
End: 2025-07-02
Attending: FAMILY MEDICINE
Payer: MEDICARE

## 2025-07-02 DIAGNOSIS — G89.29 CHRONIC LEFT HIP PAIN: Primary | ICD-10-CM

## 2025-07-02 DIAGNOSIS — M25.552 CHRONIC LEFT HIP PAIN: Primary | ICD-10-CM

## 2025-07-02 DIAGNOSIS — M79.605 LEFT LEG PAIN: ICD-10-CM

## 2025-07-02 PROCEDURE — 97140 MANUAL THERAPY 1/> REGIONS: CPT

## 2025-07-02 PROCEDURE — 97110 THERAPEUTIC EXERCISES: CPT

## 2025-07-02 NOTE — OP THERAPY DAILY TREATMENT
Outpatient Physical Therapy  DAILY TREATMENT     [facility] Outpatient Physical Therapy  [address]  [place] [address]  Phone:  [phone]  Fax:  [phone]    Date: 07/02/2025    Patient: Price Moon  YOB: 1945  MRN: [id number]     Time Calculation    Start time: 0733  Stop time: 0817 Time Calculation (min): 44 minutes         Chief Complaint: Knee Problem and Hip Problem    Visit #: 6    SUBJECTIVE:  Pt stating he has had an increase in knee pain over the past few weeks; states his knee is killing him today. Pain is located at lateral L knee. Pt reporting he hasn't been able to complete all exercises as prescribed as he had family in town.    Of note, pt reporting he got burned by the TENS. Stating he thought he was macho and had it turned up too high.      OBJECTIVE:  Current objective measures:   Small wound and erythema noted at L anterior thigh    See PN     Therapeutic Exercises (CPT 67625):     1. recumbent bike, x5 min, cardiovascular warm up    11. prone knee/hip stretch with belt, x30 sec, additional option with RLE on table for neutral spine; belt utilized    13. objective measures    14. review of hep, encouraged to return to prev hep before progressing or adding additional exercises    15. LEFS completion    20. UPOC: 6/21/25      Therapeutic Exercise Summary: Access Code: [email or web address]  URL: [email or web address]  Date: 06/10/2025  Prepared by: Mitch Miguel    Exercises  - Clam with Resistance  - 3-4 x weekly - 3 sets - 15 reps - 3-5 sec hold  - Bridge with Resistance  - 3-4 x weekly - 3 sets - 15 reps - 3-5 sec hold  - Lateral Lunge Adductor Stretch with Counter Support  - 2 x daily - 7 x weekly - 2-3 sets - 20-30 reps  - Bridge with Arms at Sides and Feet on Swiss Ball  - 1-2 x daily - 7 x weekly - 2-3 sets - 1-2 min hold  - Isometric Gluteus Medius at Wall  - 1-2 x daily - 7 x weekly - 2-3 sets - 30 sec hold  - Seated  Sciatic Nerve Glide With Cervical Motion  - 3 x daily - 7 x weekly - 1-2 sets - 10 reps  - Prone Knee Flexion AROM  - 2 x daily - 7 x weekly - 2-3 sets - 10 reps    Pt performed these exercises with instruction and SPV.  Provided handout with these exercises for daily HEP.        Therapeutic Treatments and Modalities:     1. Manual Therapy (CPT 47072), x28 min    2. E Stim Unattended (CPT 33206), IFC and mhp to adductor x15 min, NT    Therapeutic Treatment and Modalities Summary: Manual to LLE:  STM to lateral knee, ITB and TFL  STM to adductors  SL hip flexor/quad stretching  //improved s/s with repeat amb    Time-based treatments/modalities:    Physical Therapy Timed Treatment Charges  Manual therapy minutes (CPT 91881): 14 minutes  Therapeutic exercise minutes (CPT 60861): 30 minutes      Pain rating (1-10) before treatment:  6-7/10 L lateral knee  Pain rating (1-10) after treatment:  4-5/10 L lateral knee after manual and ex; 2/10 after TENS        ASSESSMENT:   Response to treatment:   See PN     PLAN/RECOMMENDATIONS:   Plan for treatment: therapy treatment to continue next visit.  Planned interventions for next visit: continue with current treatment.

## 2025-07-03 NOTE — OP THERAPY PROGRESS SUMMARY
Outpatient Physical Therapy  PROGRESS SUMMARY NOTE      [facility] Outpatient Physical Therapy  [address]  [place] [address]  Phone:  [phone]  Fax:  [phone]    Date of Visit: 07/02/2025    Patient: Price Moon  YOB: 1945  MRN: [id number]     Referring Provider: Shanda Lu M.D.  [address]  [address]  [place] [address]   Referring Diagnosis Left leg pain [M79.605];Chronic left hip pain [M25.552, G89.29]     Visit Diagnoses     ICD-10-CM   1. Chronic left hip pain  M25.552    G89.29   2. Left leg pain  M79.605       Rehab Potential: fair/poor    Progress Report Period: 5/21/25-7/2/25    Functional Assessment Used  Lower Extremity Functional Scale Percentage: 50       Objective Findings and Assessment:   Patient progression towards goals: Pt has been seen for 6 visits overall and reporting initial improvements overall in s/s. Recently, has noticed increase in lateral L knee pain without any clear triggers. Decreased compliance to hep recently due to this increase in pain in addition to external factors and family events. Knee observation without clear deformity, swelling, or signs of infection. No pain at joint line medial or lateral and knee ROM grossly WFL. Uncertain at this time whether there is existing OA at knee due to compensations at hip or whether knee pain is referral from hip. Decreased pain overall in NWB. Pt continues to report hesitations with surgical management but is open to pain management due to cont'd hip and knee pain. Will continue to attempt additional sessions of skilled PT and will request referral to physiatry and additional imaging if deemed appropriate from pt's PCP.     Objective findings and assessment details:       Strength:       Left Hip   Planes of Motion   Abduction: 4 (prev 3+)     Right Hip   Planes of Motion   Abduction: 4-      R knee: 0-135 deg R; 0-130 deg L (no pain with OP) -in supine    In  supine 90/90:  Hip IR: 32 deg R; 22 deg L   Hip ER: 37 deg R; 25 deg L          Goals:   Short Term Goals:   1. Pt will be independent with written HEP. (Met; ongoing)   Short term goal time span:  2-4 weeks      Long Term Goals:    1. Pt will be independent with written HEP. (Met; ongoing)  2. Pt will have a sig improvement in LEFS score (eval: 58.75) (partially met; ongoing)  3. Pt will demo consistent improvements in gait including trunk rotation, arm swing and LRAD to help un-weight hip. (Partially met; ongoing)   4. Pt will be able to stand for at least 20 min without incr in hip pain with min to no modifications to complete his ADL's. (Not met)     Long term goal time span:  6-8 weeks    Plan:   Planned therapy interventions:  Neuromuscular Re-education (CPT 94420), Manual Therapy (CPT 73858), Gait Training (CPT 06486), Therapeutic Activities (CPT 46951) and Therapeutic Exercise (CPT 16610)  Frequency:  1x week  Duration in weeks:  6  Plan details:  UPOC: 8/16/25      Referring provider co-signature:  I have reviewed this plan of care and my co-signature certifies the need for services.     Certification Period: 07/02/2025 to 8/16/25    Physician Signature: ________________________________ Date: ______________

## 2025-07-09 ENCOUNTER — PHYSICAL THERAPY (OUTPATIENT)
Dept: PHYSICAL THERAPY | Facility: MEDICAL CENTER | Age: 80
End: 2025-07-09
Attending: FAMILY MEDICINE
Payer: MEDICARE

## 2025-07-09 DIAGNOSIS — M25.552 CHRONIC LEFT HIP PAIN: Primary | ICD-10-CM

## 2025-07-09 DIAGNOSIS — M79.605 LEFT LEG PAIN: ICD-10-CM

## 2025-07-09 DIAGNOSIS — G89.29 CHRONIC LEFT HIP PAIN: Primary | ICD-10-CM

## 2025-07-09 PROCEDURE — 97140 MANUAL THERAPY 1/> REGIONS: CPT

## 2025-07-09 PROCEDURE — 97110 THERAPEUTIC EXERCISES: CPT

## 2025-07-09 NOTE — OP THERAPY DAILY TREATMENT
Outpatient Physical Therapy  DAILY TREATMENT     [facility] Outpatient Physical Therapy  [address]  [place] [address]  Phone:  [phone]  Fax:  [phone]    Date: 07/09/2025    Patient: Price Moon  YOB: 1945  MRN: [id number]     Time Calculation    Start time: 0730  Stop time: 0818 Time Calculation (min): 48 minutes         Chief Complaint: Hip Problem    Visit #: 7    SUBJECTIVE:  Pt stating he is having hip pain today in addition to the knee, feels this may be attributed to housework on being on the ladder multiple times. Is noticing he is now near the point of collapse on the LLE.     OBJECTIVE:  Current objective measures:   Small wound and erythema noted at L anterior thigh      Therapeutic Exercises (CPT 85256):     1. recumbent bike, x5 min, cardiovascular warm up    2. truestretch-hip flexor, x30 sec, reviewed    3. ball bridge, x2 min, reviewed    4. ball bridge+SL lifts, 4x4, progressed hep    5. marching bridge, x10+suprapatellar band -pink, progressed hep    6. pt education, re: self psoas release tool, HO provided    7. self LAD using TB, set up and demo; hep    11. prone knee/hip stretch with belt, x30 sec, NT    20. UPOC: 8/2/25      Therapeutic Exercise Summary: Access Code: [email or web address]  URL: [email or web address]  Date: 06/10/2025  Prepared by: Mitch Miguel    Exercises  - Clam with Resistance  - 3-4 x weekly - 3 sets - 15 reps - 3-5 sec hold  - Bridge with Resistance  - 3-4 x weekly - 3 sets - 15 reps - 3-5 sec hold  - Lateral Lunge Adductor Stretch with Counter Support  - 2 x daily - 7 x weekly - 2-3 sets - 20-30 reps  - Bridge with Arms at Sides and Feet on Swiss Ball  - 1-2 x daily - 7 x weekly - 2-3 sets - 1-2 min hold  - Isometric Gluteus Medius at Wall  - 1-2 x daily - 7 x weekly - 2-3 sets - 30 sec hold  - Seated Sciatic Nerve Glide With Cervical Motion  - 3 x daily - 7 x weekly - 1-2 sets - 10 reps  - Prone  Knee Flexion AROM  - 2 x daily - 7 x weekly - 2-3 sets - 10 reps    Pt performed these exercises with instruction and SPV.  Provided handout with these exercises for daily HEP.        Therapeutic Treatments and Modalities:     1. Manual Therapy (CPT 17234), L psoas release and LAD using belt, x5 min    Time-based treatments/modalities:    Physical Therapy Timed Treatment Charges  Manual therapy minutes (CPT 69979): 5 minutes  Therapeutic exercise minutes (CPT 56033): 33 minutes      Pain rating (1-10) before treatment:  pain at lateral knee and hip           ASSESSMENT:   Response to treatment:   Decreased antalgic gait pattern and reports of decreased pain following strength progression and psoas release; discussion of options for self release and traction with HO's provided. Will continue to progress strengthening in WB and NWB positions to tolerance.       PLAN/RECOMMENDATIONS:   Plan for treatment: therapy treatment to continue next visit.  Planned interventions for next visit: continue with current treatment. Assess response to hep  Modified plantigrade exercises

## 2025-07-14 ENCOUNTER — PHYSICAL THERAPY (OUTPATIENT)
Dept: PHYSICAL THERAPY | Facility: MEDICAL CENTER | Age: 80
End: 2025-07-14
Attending: FAMILY MEDICINE
Payer: MEDICARE

## 2025-07-14 DIAGNOSIS — M25.552 CHRONIC LEFT HIP PAIN: Primary | ICD-10-CM

## 2025-07-14 DIAGNOSIS — G89.29 CHRONIC LEFT HIP PAIN: Primary | ICD-10-CM

## 2025-07-14 PROCEDURE — 97110 THERAPEUTIC EXERCISES: CPT

## 2025-07-14 NOTE — OP THERAPY DAILY TREATMENT
Outpatient Physical Therapy  DAILY TREATMENT     [facility] Outpatient Physical Therapy  [address]  [place] [address]  Phone:  [phone]  Fax:  [phone]    Date: 07/14/2025    Patient: Price Moon  YOB: 1945  MRN: [id number]     Time Calculation    Start time: 1500  Stop time: 1557 Time Calculation (min): 57 minutes         Chief Complaint: Hip Problem    Visit #: 8    SUBJECTIVE:  Feels he is getting stronger as he is increasing hep reps. Has decreased some of his workouts so he can get them all done. Will have pain even on days where he is not completing work on his house.       OBJECTIVE:  Current objective measures:   Small wound and erythema noted at L anterior thigh-healing scab      Therapeutic Exercises (CPT 60296):     1. recumbent bike, x5 min, cardiovascular warm up    2. truestretch-hip flexor, x30 sec, reviewed    3. ball bridge, x2 min, NT    4. ball bridge+SL lifts, 4x4, NT    5. marching bridge, x10+suprapatellar band -pink, NT    7. shuttle leg press, 7c x20, 4c x20 Lr, 6c x20, fatigue; multiple VC's to slow down eccentrics    8. sit to stands with cruzito disc under 1 foot, x10 ea raised->lowered plinth, difficulty L>R, VC's for slowed eccentrics with improvements    9. staggered sit to stands, x10 ea, hep    10. pt education, re: moist wound healing    12. modified plantigrade execises, x10 (extensions, diagonals, clamshells), VC's to monitor l/s compensations    20. UPOC: 8/2/25      Therapeutic Exercise Summary: Access Code: [email or web address]  URL: [email or web address]  Date: 06/10/2025  Prepared by: Mitch Miguel    Exercises  - Clam with Resistance  - 3-4 x weekly - 3 sets - 15 reps - 3-5 sec hold  - Bridge with Resistance  - 3-4 x weekly - 3 sets - 15 reps - 3-5 sec hold  - Lateral Lunge Adductor Stretch with Counter Support  - 2 x daily - 7 x weekly - 2-3 sets - 20-30 reps  - Bridge with Arms at Sides and Feet on Swiss  Ball  - 1-2 x daily - 7 x weekly - 2-3 sets - 1-2 min hold  - Isometric Gluteus Medius at Wall  - 1-2 x daily - 7 x weekly - 2-3 sets - 30 sec hold  - Seated Sciatic Nerve Glide With Cervical Motion  - 3 x daily - 7 x weekly - 1-2 sets - 10 reps  - Prone Knee Flexion AROM  - 2 x daily - 7 x weekly - 2-3 sets - 10 reps    Pt performed these exercises with instruction and SPV.  Provided handout with these exercises for daily HEP.        Time-based treatments/modalities:    Physical Therapy Timed Treatment Charges  Therapeutic exercise minutes (CPT 55794): 57 minutes      Pain rating (1-10) before treatment:  2/10 pain at lateral knee and hip       ASSESSMENT:   Response to treatment:   Improving strength with hep; discussion on ways to advance current hep (I.e incr hold time versus simply adding more repetitions to increase challenge). Repeated cuing to slow eccentric portion of exercises with improvement by end of session. Will condense hep next visit and progress load tolerance. Pain-free modified plantigrade exercises, will add resistance next visit. Will additionally review gait due to abnormal gait pattern and incr frontal plane motion.    PLAN/RECOMMENDATIONS:   Plan for treatment: therapy treatment to continue next visit.  Planned interventions for next visit: continue with current treatment. Condense hep; review of gait;

## 2025-07-16 ENCOUNTER — PHYSICAL THERAPY (OUTPATIENT)
Dept: PHYSICAL THERAPY | Facility: MEDICAL CENTER | Age: 80
End: 2025-07-16
Attending: FAMILY MEDICINE
Payer: MEDICARE

## 2025-07-16 DIAGNOSIS — M25.552 CHRONIC LEFT HIP PAIN: Primary | ICD-10-CM

## 2025-07-16 DIAGNOSIS — G89.29 CHRONIC LEFT HIP PAIN: Primary | ICD-10-CM

## 2025-07-16 DIAGNOSIS — M79.605 LEFT LEG PAIN: ICD-10-CM

## 2025-07-16 PROCEDURE — 97110 THERAPEUTIC EXERCISES: CPT

## 2025-07-16 NOTE — OP THERAPY DAILY TREATMENT
Outpatient Physical Therapy  DAILY TREATMENT     [facility] Outpatient Physical Therapy  [address]  [place] [address]  Phone:  [phone]  Fax:  [phone]    Date: 07/16/2025    Patient: Price Moon  YOB: 1945  MRN: [id number]     Time Calculation                   Chief Complaint: No chief complaint on file.    Visit #: 9    SUBJECTIVE:  Pt       OBJECTIVE:  Current objective measures:   Small wound and erythema noted at L anterior thigh-healing scab    Trendelenberg gait pattern      Therapeutic Exercises (CPT 57854):     1. recumbent bike, x5 min, cardiovascular warm up    2. truestretch-hip flexor, x30 sec, NT    3. ball bridge, x2 min, NT    4. ball bridge+SL lifts, 4x4, NT    5. marching bridge, x10+suprapatellar band -pink, NT    7. shuttle leg press, 7c x20, 4c x20 Lr, 6c x20, fatigue; multiple VC's to slow down eccentrics    8. sit to stands with cruzito disc under 1 foot, x10 ea raised->lowered plinth, NT    9. staggered sit to stands, x10 ea, NT    10. pt education, re: moist wound healing, NT    12. modified plantigrade execises, x10 (extensions, diagonals, fire hydrants)x10 ea->add OTB x10 with 1 sec hold, VC's to monitor l/s compensations; inc challenge on L    13. gait review, x50 ft, tactile cuing at shoulders    20. UPOC: 8/2/25      Therapeutic Exercise Summary: Access Code: [email or web address]  URL: [email or web address]  Date: 06/10/2025  Prepared by: Mitch Miguel    Exercises  - Clam with Resistance  - 3-4 x weekly - 3 sets - 15 reps - 3-5 sec hold  - Bridge with Resistance  - 3-4 x weekly - 3 sets - 15 reps - 3-5 sec hold  - Lateral Lunge Adductor Stretch with Counter Support  - 2 x daily - 7 x weekly - 2-3 sets - 20-30 reps  - Bridge with Arms at Sides and Feet on Swiss Ball  - 1-2 x daily - 7 x weekly - 2-3 sets - 1-2 min hold  - Isometric Gluteus Medius at Wall  - 1-2 x daily - 7 x weekly - 2-3 sets - 30 sec hold  - Seated  Sciatic Nerve Glide With Cervical Motion  - 3 x daily - 7 x weekly - 1-2 sets - 10 reps  - Prone Knee Flexion AROM  - 2 x daily - 7 x weekly - 2-3 sets - 10 reps    Pt performed these exercises with instruction and SPV.  Provided handout with these exercises for daily HEP.        Therapeutic Treatments and Modalities:     1. Manual Therapy (CPT 97038), see below, x5 min    Therapeutic Treatment and Modalities Summary: Manual:  SL L hip quad/flexor stretch followed by hip adductor stretch in supine. Lastly, LAD 5x10 sec to LLE using belt in supine.     Time-based treatments/modalities:           Pain rating (1-10) before treatment:  2/10 pain at lateral knee and hip       ASSESSMENT:   Response to treatment:   Improving strength with hep; reviewed progression of hep (I.e incr hold time versus simply adding more repetitions to increase challenge). Pain-free modified plantigrade exercises with good tolerance to added resistance, mild fatigue at end of session. Will additionally review gait due to abnormal gait pattern and incr frontal plane motion. Continue loading tolerance with combination of both WB, NWB, and partial WB scenarios to improve strength.    PLAN/RECOMMENDATIONS:   Plan for treatment: therapy treatment to continue next visit.  Planned interventions for next visit: continue with current treatment. Condense hep; review of gait; assess response to modified plantigrade hep

## 2025-07-21 ENCOUNTER — PHYSICAL THERAPY (OUTPATIENT)
Dept: PHYSICAL THERAPY | Facility: MEDICAL CENTER | Age: 80
End: 2025-07-21
Attending: FAMILY MEDICINE
Payer: MEDICARE

## 2025-07-21 DIAGNOSIS — M25.552 CHRONIC LEFT HIP PAIN: Primary | ICD-10-CM

## 2025-07-21 DIAGNOSIS — G89.29 CHRONIC LEFT HIP PAIN: Primary | ICD-10-CM

## 2025-07-21 DIAGNOSIS — M79.605 LEFT LEG PAIN: ICD-10-CM

## 2025-07-21 PROCEDURE — 97110 THERAPEUTIC EXERCISES: CPT

## 2025-07-21 NOTE — OP THERAPY DAILY TREATMENT
Outpatient Physical Therapy  DAILY TREATMENT     [facility] Outpatient Physical Therapy  [address]  [place] [address]  Phone:  [phone]  Fax:  [phone]    Date: 07/21/2025    Patient: Price Moon  YOB: 1945  MRN: [id number]     Time Calculation    Start time: 1415  Stop time: 1500 Time Calculation (min): 45 minutes         Chief Complaint: Loss Of Balance and Knee Problem    Visit #: 10    SUBJECTIVE:  Pt presents today for therapy services. He is feeling pretty good but working on some siding on her house and is going up and down a ladder alot      OBJECTIVE:  Current objective measures:   Small wound and erythema noted at L anterior thigh-healing scab    Trendelenberg gait pattern      Therapeutic Exercises (CPT 98836):     1. recumbent bike, x5 min, cardiovascular warm up    2. truestretch-hip flexor, x30 sec    4. Prone hip flexor release, over deflated soccer ball    5. Cat camel in quadruped    7. Half kneeling hip flexor stretch, Air ex pad    9. Large steps up to 12 step with UE assist, x 15 each side    10. Large step control down with UE support, x 15 with a 3-5 sec count    12. Small step heel touch and mid range hold, 10 reps x 10 sec    14. Gait in P bars, Slow gliding steps, heel toe    20. UPOC: 8/2/25      Therapeutic Exercise Summary: Access Code: [email or web address]  URL: [email or web address]  Date: 06/10/2025  Prepared by: Mitch Miguel    Exercises  - Clam with Resistance  - 3-4 x weekly - 3 sets - 15 reps - 3-5 sec hold  - Bridge with Resistance  - 3-4 x weekly - 3 sets - 15 reps - 3-5 sec hold  - Lateral Lunge Adductor Stretch with Counter Support  - 2 x daily - 7 x weekly - 2-3 sets - 20-30 reps  - Bridge with Arms at Sides and Feet on Swiss Ball  - 1-2 x daily - 7 x weekly - 2-3 sets - 1-2 min hold  - Isometric Gluteus Medius at Wall  - 1-2 x daily - 7 x weekly - 2-3 sets - 30 sec hold  - Seated Sciatic Nerve Glide  With Cervical Motion  - 3 x daily - 7 x weekly - 1-2 sets - 10 reps  - Prone Knee Flexion AROM  - 2 x daily - 7 x weekly - 2-3 sets - 10 reps    Pt performed these exercises with instruction and SPV.  Provided handout with these exercises for daily HEP.          Time-based treatments/modalities:    Physical Therapy Timed Treatment Charges  Therapeutic exercise minutes (CPT 81794): 45 minutes      Pain rating (1-10) before treatment:  2/10 pain at lateral knee and hip       ASSESSMENT:   Response to treatment:   Improving strength with hep. He has some poor stance ability on the Left leg. Keep progressing as tolerated.     PLAN/RECOMMENDATIONS:   Plan for treatment: therapy treatment to continue next visit.  Planned interventions for next visit: continue with current treatment. Condense hep; review of gait; assess response to modified plantigrade hep

## 2025-07-23 ENCOUNTER — PHYSICAL THERAPY (OUTPATIENT)
Dept: PHYSICAL THERAPY | Facility: MEDICAL CENTER | Age: 80
End: 2025-07-23
Attending: FAMILY MEDICINE
Payer: MEDICARE

## 2025-07-23 DIAGNOSIS — M25.552 CHRONIC LEFT HIP PAIN: Primary | ICD-10-CM

## 2025-07-23 DIAGNOSIS — G89.29 CHRONIC LEFT HIP PAIN: Primary | ICD-10-CM

## 2025-07-23 PROCEDURE — 97110 THERAPEUTIC EXERCISES: CPT

## 2025-07-23 NOTE — OP THERAPY DAILY TREATMENT
Outpatient Physical Therapy  DAILY TREATMENT     [facility] Outpatient Physical Therapy  [address]  [place] [address]  Phone:  [phone]  Fax:  [phone]    Date: 07/23/2025    Patient: Price Moon  YOB: 1945  MRN: [id number]     Time Calculation    Start time: 0730  Stop time: 0816 Time Calculation (min): 46 minutes         Chief Complaint: Hip Problem    Visit #: 11    SUBJECTIVE:  Pt stating he has been working on walking and despite still getting pain, believes it is helpful. Feels he is definitely getting stronger. Pain is minimal today.      OBJECTIVE:  Current objective measures:   Small wound and erythema noted at L anterior thigh-healing scab    Trendelenberg gait pattern      Therapeutic Exercises (CPT 28518):     1. recumbent bike, x5 min, cardiovascular warm up    2. review and condensed hep, reprinted for pt and reviewed verbally    12. Small step heel touch and mid range hold, 10 reps x 10 sec    14. gait, verbal review    15. resisted walking, 2 sport cords; x10 fwd and bwd, x5 lateral, discomfort with poor control observed    16. monster walking at bar, GTB, fwd and bwd x2 min, fatigue; hep    17. box push, x50 ft with 2 35# DB's, x50 ft with 2 35# DB's and 35# box    18. step ups with slow eccentrics, 12in step; x15, hep; repeated cuing to slow down eccentrics    19. BOSU squats, 2x15, good control noted    20. UPOC: 8/2/25      Therapeutic Exercise Summary: Access Code: [email or web address]  URL: [email or web address]  Date: 07/23/2025  Prepared by: Mitch Miguel    Exercises  - Lateral Lunge Adductor Stretch with Counter Support  - 2 x daily - 7 x weekly - 2-3 sets - 20-30 reps  - Prone Knee Flexion AROM  - 2 x daily - 7 x weekly - 2-3 sets - 10 reps  - Bridge with Arms at Sides and Feet on Swiss Ball  - 2-3 x weekly - 4-5 sets - 4 reps - 1-2 sec hold  - Marching Bridge  - 2-3 x weekly - 2-3 sets - 10 reps - 1-2 sec hold  -  Staggered Sit-to-Stand  - 1-2 x weekly - 2-3 sets - 10 reps  - Backward Monster Walks  - 2-3 x weekly  - Forward Monster Walks  - 2-3 x weekly  - Step Downs  - 2-3 x weekly - 3 sets - 15 reps      Time-based treatments/modalities:    Physical Therapy Timed Treatment Charges  Therapeutic exercise minutes (CPT 16950): 46 minutes      Pain rating (1-10) before treatment:  0/10 pain at lateral knee and hip       ASSESSMENT:   Response to treatment:   Fatigue and difficulty with resisted walking shorty in frontal plane, theredfore DC-ed and replaced with monster walking with good tolerance. Pain more rapidly returns to baseline compared to previous visits. Improving tolerance to incr load in WB and will continue in functional positions as tolerated in follow ups. Continue strengthening, shorty frontal plane.      PLAN/RECOMMENDATIONS:   Plan for treatment: therapy treatment to continue next visit.  Planned interventions for next visit: continue with current treatment.

## 2025-07-28 ENCOUNTER — PHYSICAL THERAPY (OUTPATIENT)
Dept: PHYSICAL THERAPY | Facility: MEDICAL CENTER | Age: 80
End: 2025-07-28
Attending: FAMILY MEDICINE
Payer: MEDICARE

## 2025-07-28 DIAGNOSIS — G89.29 CHRONIC LEFT HIP PAIN: Primary | ICD-10-CM

## 2025-07-28 DIAGNOSIS — M25.552 CHRONIC LEFT HIP PAIN: Primary | ICD-10-CM

## 2025-07-28 DIAGNOSIS — M79.605 LEFT LEG PAIN: ICD-10-CM

## 2025-07-28 PROCEDURE — 97110 THERAPEUTIC EXERCISES: CPT

## 2025-07-28 PROCEDURE — 97140 MANUAL THERAPY 1/> REGIONS: CPT

## 2025-07-28 NOTE — OP THERAPY DAILY TREATMENT
Outpatient Physical Therapy  DAILY TREATMENT     [facility] Outpatient Physical Therapy  [address]  [place] [address]  Phone:  [phone]  Fax:  [phone]    Date: 07/28/2025    Patient: Price Moon  YOB: 1945  MRN: [id number]     Time Calculation    Start time: 0730  Stop time: 0810 Time Calculation (min): 40 minutes         Chief Complaint: Hip Problem    Visit #: 12    SUBJECTIVE:  Pt stating he had two great sessions and had a few days of walking better than he has before. Then completed a few minutes on strider at mod resistance and hasn't felt good since.       OBJECTIVE:  Current objective measures:   Small wound and erythema noted at L anterior thigh-healing scab    Trendelenberg gait pattern      Therapeutic Exercises (CPT 34784):     1. recumbent bike, x5 min L1, cardiovascular warm up    2. review and condensed hep, reprinted for pt and reviewed verbally    10. shuttle leg press, 7c x20 DL, 4c x20 L, 6c x20 R, 8c x10 DL, 7c x10 R, 5c x10 L (RLE assist as needed)    12. tandem stance on 1/2 FR in // bars, 3x30 sec ea, intermittent holds on bars    14. BOSU mini squats, x20    15. BOSU step ups and downs, x10 ea    20. UPOC: 8/2/25      Therapeutic Exercise Summary: Access Code: [email or web address]  URL: [email or web address]  Date: 07/23/2025  Prepared by: Mitch Miguel    Exercises  - Lateral Lunge Adductor Stretch with Counter Support  - 2 x daily - 7 x weekly - 2-3 sets - 20-30 reps  - Prone Knee Flexion AROM  - 2 x daily - 7 x weekly - 2-3 sets - 10 reps  - Bridge with Arms at Sides and Feet on Swiss Ball  - 2-3 x weekly - 4-5 sets - 4 reps - 1-2 sec hold  - Marching Bridge  - 2-3 x weekly - 2-3 sets - 10 reps - 1-2 sec hold  - Staggered Sit-to-Stand  - 1-2 x weekly - 2-3 sets - 10 reps  - Backward Monster Walks  - 2-3 x weekly  - Forward Monster Walks  - 2-3 x weekly  - Step Downs  - 2-3 x weekly - 3 sets - 15 reps    Therapeutic  Treatments and Modalities:     1. Manual Therapy (CPT 69539), see below, x15 min    Therapeutic Treatment and Modalities Summary: Manual:   Manual hip adductor stretch, hip flexor/quad stretching in SL. Then hip IR and ER stretching in supine and inf hip mobs with belt gr III     Time-based treatments/modalities:    Physical Therapy Timed Treatment Charges  Manual therapy minutes (CPT 90079): 15 minutes  Therapeutic exercise minutes (CPT 37160): 25 minutes  Pain rating (1-10) before treatment:  sore at lateral knee and hip   Pain rating after treatment: 2/10      ASSESSMENT:   Response to treatment:   Able to diminish hip and knee pain with manual; noted deficits in inferior glide. May benefit from trial of indep self joint mobs using belt in follow ups as adjunct to strengthening/mobility program.    PLAN/RECOMMENDATIONS:   Plan for treatment: therapy treatment to continue next visit.  Planned interventions for next visit: continue with current treatment.

## 2025-07-30 ENCOUNTER — PHYSICAL THERAPY (OUTPATIENT)
Dept: PHYSICAL THERAPY | Facility: MEDICAL CENTER | Age: 80
End: 2025-07-30
Attending: FAMILY MEDICINE
Payer: MEDICARE

## 2025-07-30 DIAGNOSIS — G89.29 CHRONIC LEFT HIP PAIN: Primary | ICD-10-CM

## 2025-07-30 DIAGNOSIS — M79.605 LEFT LEG PAIN: ICD-10-CM

## 2025-07-30 DIAGNOSIS — M25.552 CHRONIC LEFT HIP PAIN: Primary | ICD-10-CM

## 2025-07-30 PROCEDURE — 97110 THERAPEUTIC EXERCISES: CPT

## 2025-07-30 NOTE — OP THERAPY DAILY TREATMENT
Outpatient Physical Therapy  DAILY TREATMENT     [facility] Outpatient Physical Therapy  [address]  [place] [address]  Phone:  [phone]  Fax:  [phone]    Date: 07/30/2025    Patient: Price Moon  YOB: 1945  MRN: [id number]     Time Calculation    Start time: 0731  Stop time: 0812 Time Calculation (min): 41 minutes         Chief Complaint: Knee Problem and Hip Problem    Visit #: 13    SUBJECTIVE:  Pt stating the leg doesn't seem to be responding. Is noticing pain in the quad when he's walking.     OBJECTIVE:  Current objective measures:         Therapeutic Exercises (CPT 76427):     1. recumbent bike, x5 min L1, cardiovascular warm up    2. review and condensed hep, reprinted for pt and reviewed verbally    3. self hip joint mobs with band in half kneeling, chair for support    5. side plank-modified, 2x endurance, L: 32 sec, 30 sec; R: 1 min, 1 min; hep    6. forward plank, 2x endurance, 42 sec, 37 sec    7. alternative for side plank for home if needed, baby get up, hep HO provided    10. shuttle leg press, 7c x20 DL, 4c x20 L, 6c x20 R, NT, 8c x10 DL, 7c x10 R, 5c x10 L (RLE assist as needed), NT    12. tandem stance on 1/2 FR in // bars, 3x30 sec ea, intermittent holds on bars    13. resisted walking, 2 sport cords; x10 fwd and bwd, NT    14. BOSU mini squats, x20, NT    15. BOSU step ups and downs, x10 ea, NT    20. UPOC: 8/2/25      Therapeutic Exercise Summary: Access Code: [email or web address]  URL: [email or web address]  Date: 07/23/2025  Prepared by: Mitch Miguel    Exercises  - Lateral Lunge Adductor Stretch with Counter Support  - 2 x daily - 7 x weekly - 2-3 sets - 20-30 reps  - Prone Knee Flexion AROM  - 2 x daily - 7 x weekly - 2-3 sets - 10 reps  - Bridge with Arms at Sides and Feet on Swiss Ball  - 2-3 x weekly - 4-5 sets - 4 reps - 1-2 sec hold  - Marching Bridge  - 2-3 x weekly - 2-3 sets - 10 reps - 1-2 sec hold  -  Staggered Sit-to-Stand  - 1-2 x weekly - 2-3 sets - 10 reps  - Backward Monster Walks  - 2-3 x weekly  - Forward Monster Walks  - 2-3 x weekly  - Step Downs  - 2-3 x weekly - 3 sets - 15 reps    Therapeutic Treatments and Modalities:     1. Manual Therapy (CPT 05980), see below, x15 min    Therapeutic Treatment and Modalities Summary: Manual:   Manual hip adductor stretch, hip flexor/quad stretching in SL. Then hip IR and ER stretching in supine and inf hip mobs with belt gr III     Time-based treatments/modalities:    Physical Therapy Timed Treatment Charges  Therapeutic exercise minutes (CPT 73938): 41 minutes    Pain rating (1-10) before treatment: sore posterior thigh and quad  Pain rating after treatment: 0/10      ASSESSMENT:   Response to treatment:   Emphasis on pelvic stabilization in NWB position as tolerated due to incr in s/s. Mod weakness noted at L QL. Able to abolish s/s following session. Provided pt with alternative of baby get up exercise due to intermittent complaints of shoulder discomfort (immediately resolved with discontinuing ex). Pt may benefit from continuation on pelvic stab with special attention to QL and glute med. Will additionally introduce exercises to incorporate muscle sling relationships in future visits.       PLAN/RECOMMENDATIONS:   Plan for treatment: therapy treatment to continue next visit.  Planned interventions for next visit: continue with current treatment.

## 2025-08-04 ENCOUNTER — PHYSICAL THERAPY (OUTPATIENT)
Dept: PHYSICAL THERAPY | Facility: MEDICAL CENTER | Age: 80
End: 2025-08-04
Attending: FAMILY MEDICINE
Payer: MEDICARE

## 2025-08-04 DIAGNOSIS — M25.552 CHRONIC LEFT HIP PAIN: Primary | ICD-10-CM

## 2025-08-04 DIAGNOSIS — G89.29 CHRONIC LEFT HIP PAIN: Primary | ICD-10-CM

## 2025-08-04 PROCEDURE — 97110 THERAPEUTIC EXERCISES: CPT

## 2025-08-04 PROCEDURE — 97140 MANUAL THERAPY 1/> REGIONS: CPT

## 2025-08-06 ENCOUNTER — PHYSICAL THERAPY (OUTPATIENT)
Dept: PHYSICAL THERAPY | Facility: MEDICAL CENTER | Age: 80
End: 2025-08-06
Attending: FAMILY MEDICINE
Payer: MEDICARE

## 2025-08-06 DIAGNOSIS — M79.605 LEFT LEG PAIN: ICD-10-CM

## 2025-08-06 DIAGNOSIS — M25.552 CHRONIC LEFT HIP PAIN: Primary | ICD-10-CM

## 2025-08-06 DIAGNOSIS — G89.29 CHRONIC LEFT HIP PAIN: Primary | ICD-10-CM

## 2025-08-06 PROCEDURE — 97110 THERAPEUTIC EXERCISES: CPT

## 2025-08-15 ENCOUNTER — HOSPITAL ENCOUNTER (OUTPATIENT)
Dept: LAB | Facility: MEDICAL CENTER | Age: 80
End: 2025-08-15
Attending: FAMILY MEDICINE
Payer: MEDICARE

## 2025-08-15 DIAGNOSIS — I10 PRIMARY HYPERTENSION: ICD-10-CM

## 2025-08-15 DIAGNOSIS — R73.03 PREDIABETES: ICD-10-CM

## 2025-08-15 DIAGNOSIS — E78.5 DYSLIPIDEMIA: ICD-10-CM

## 2025-08-15 LAB
ALBUMIN SERPL BCP-MCNC: 4 G/DL (ref 3.2–4.9)
ALBUMIN/GLOB SERPL: 1.2 G/DL
ALP SERPL-CCNC: 91 U/L (ref 30–99)
ALT SERPL-CCNC: 32 U/L (ref 2–50)
ANION GAP SERPL CALC-SCNC: 11 MMOL/L (ref 7–16)
AST SERPL-CCNC: 29 U/L (ref 12–45)
BASOPHILS # BLD AUTO: 0.8 % (ref 0–1.8)
BASOPHILS # BLD: 0.04 K/UL (ref 0–0.12)
BILIRUB SERPL-MCNC: 1.1 MG/DL (ref 0.1–1.5)
BUN SERPL-MCNC: 21 MG/DL (ref 8–22)
CALCIUM ALBUM COR SERPL-MCNC: 9.5 MG/DL (ref 8.5–10.5)
CALCIUM SERPL-MCNC: 9.5 MG/DL (ref 8.5–10.5)
CHLORIDE SERPL-SCNC: 103 MMOL/L (ref 96–112)
CHOLEST SERPL-MCNC: 95 MG/DL (ref 100–199)
CO2 SERPL-SCNC: 25 MMOL/L (ref 20–33)
CREAT SERPL-MCNC: 1.26 MG/DL (ref 0.5–1.4)
CREAT UR-MCNC: 160 MG/DL
EOSINOPHIL # BLD AUTO: 0.04 K/UL (ref 0–0.51)
EOSINOPHIL NFR BLD: 0.8 % (ref 0–6.9)
ERYTHROCYTE [DISTWIDTH] IN BLOOD BY AUTOMATED COUNT: 47.4 FL (ref 35.9–50)
EST. AVERAGE GLUCOSE BLD GHB EST-MCNC: 123 MG/DL
GFR SERPLBLD CREATININE-BSD FMLA CKD-EPI: 58 ML/MIN/1.73 M 2
GLOBULIN SER CALC-MCNC: 3.3 G/DL (ref 1.9–3.5)
GLUCOSE SERPL-MCNC: 90 MG/DL (ref 65–99)
HBA1C MFR BLD: 5.9 % (ref 4–5.6)
HCT VFR BLD AUTO: 46.9 % (ref 42–52)
HDLC SERPL-MCNC: 47 MG/DL
HGB BLD-MCNC: 15.3 G/DL (ref 14–18)
IMM GRANULOCYTES # BLD AUTO: 0.02 K/UL (ref 0–0.11)
IMM GRANULOCYTES NFR BLD AUTO: 0.4 % (ref 0–0.9)
LDLC SERPL CALC-MCNC: 37 MG/DL
LYMPHOCYTES # BLD AUTO: 1.75 K/UL (ref 1–4.8)
LYMPHOCYTES NFR BLD: 35.9 % (ref 22–41)
MCH RBC QN AUTO: 31 PG (ref 27–33)
MCHC RBC AUTO-ENTMCNC: 32.6 G/DL (ref 32.3–36.5)
MCV RBC AUTO: 94.9 FL (ref 81.4–97.8)
MICROALBUMIN UR-MCNC: <1.2 MG/DL
MICROALBUMIN/CREAT UR: NORMAL MG/G (ref 0–30)
MONOCYTES # BLD AUTO: 0.51 K/UL (ref 0–0.85)
MONOCYTES NFR BLD AUTO: 10.5 % (ref 0–13.4)
NEUTROPHILS # BLD AUTO: 2.52 K/UL (ref 1.82–7.42)
NEUTROPHILS NFR BLD: 51.6 % (ref 44–72)
NRBC # BLD AUTO: 0 K/UL
NRBC BLD-RTO: 0 /100 WBC (ref 0–0.2)
PLATELET # BLD AUTO: 195 K/UL (ref 164–446)
PMV BLD AUTO: 9.4 FL (ref 9–12.9)
POTASSIUM SERPL-SCNC: 4.9 MMOL/L (ref 3.6–5.5)
PROT SERPL-MCNC: 7.3 G/DL (ref 6–8.2)
RBC # BLD AUTO: 4.94 M/UL (ref 4.7–6.1)
SODIUM SERPL-SCNC: 139 MMOL/L (ref 135–145)
TRIGL SERPL-MCNC: 55 MG/DL (ref 0–149)
WBC # BLD AUTO: 4.9 K/UL (ref 4.8–10.8)

## 2025-08-15 PROCEDURE — 85025 COMPLETE CBC W/AUTO DIFF WBC: CPT

## 2025-08-15 PROCEDURE — 82570 ASSAY OF URINE CREATININE: CPT

## 2025-08-15 PROCEDURE — 82043 UR ALBUMIN QUANTITATIVE: CPT

## 2025-08-15 PROCEDURE — 80061 LIPID PANEL: CPT

## 2025-08-15 PROCEDURE — 83036 HEMOGLOBIN GLYCOSYLATED A1C: CPT | Mod: GA

## 2025-08-15 PROCEDURE — 36415 COLL VENOUS BLD VENIPUNCTURE: CPT

## 2025-08-15 PROCEDURE — 80053 COMPREHEN METABOLIC PANEL: CPT

## 2025-08-16 ENCOUNTER — RESULTS FOLLOW-UP (OUTPATIENT)
Dept: MEDICAL GROUP | Facility: MEDICAL CENTER | Age: 80
End: 2025-08-16
Payer: MEDICARE

## 2025-08-19 ENCOUNTER — PHYSICAL THERAPY (OUTPATIENT)
Dept: PHYSICAL THERAPY | Facility: MEDICAL CENTER | Age: 80
End: 2025-08-19
Attending: FAMILY MEDICINE
Payer: MEDICARE

## 2025-08-19 DIAGNOSIS — M25.552 CHRONIC LEFT HIP PAIN: Primary | ICD-10-CM

## 2025-08-19 DIAGNOSIS — G89.29 CHRONIC LEFT HIP PAIN: Primary | ICD-10-CM

## 2025-08-19 DIAGNOSIS — M79.605 LEFT LEG PAIN: ICD-10-CM

## 2025-08-19 PROCEDURE — 97110 THERAPEUTIC EXERCISES: CPT

## 2025-08-19 SDOH — ECONOMIC STABILITY: FOOD INSECURITY: WITHIN THE PAST 12 MONTHS, YOU WORRIED THAT YOUR FOOD WOULD RUN OUT BEFORE YOU GOT MONEY TO BUY MORE.: NEVER TRUE

## 2025-08-19 SDOH — ECONOMIC STABILITY: FOOD INSECURITY: WITHIN THE PAST 12 MONTHS, THE FOOD YOU BOUGHT JUST DIDN'T LAST AND YOU DIDN'T HAVE MONEY TO GET MORE.: NEVER TRUE

## 2025-08-19 SDOH — HEALTH STABILITY: PHYSICAL HEALTH: ON AVERAGE, HOW MANY MINUTES DO YOU ENGAGE IN EXERCISE AT THIS LEVEL?: 60 MIN

## 2025-08-19 SDOH — HEALTH STABILITY: PHYSICAL HEALTH: ON AVERAGE, HOW MANY DAYS PER WEEK DO YOU ENGAGE IN MODERATE TO STRENUOUS EXERCISE (LIKE A BRISK WALK)?: 7 DAYS

## 2025-08-19 SDOH — ECONOMIC STABILITY: INCOME INSECURITY: HOW HARD IS IT FOR YOU TO PAY FOR THE VERY BASICS LIKE FOOD, HOUSING, MEDICAL CARE, AND HEATING?: NOT HARD AT ALL

## 2025-08-19 SDOH — ECONOMIC STABILITY: INCOME INSECURITY: IN THE LAST 12 MONTHS, WAS THERE A TIME WHEN YOU WERE NOT ABLE TO PAY THE MORTGAGE OR RENT ON TIME?: NO

## 2025-08-19 ASSESSMENT — SOCIAL DETERMINANTS OF HEALTH (SDOH)
HOW OFTEN DO YOU HAVE SIX OR MORE DRINKS ON ONE OCCASION: NEVER
HOW OFTEN DO YOU ATTENT MEETINGS OF THE CLUB OR ORGANIZATION YOU BELONG TO?: NEVER
HOW OFTEN DO YOU GET TOGETHER WITH FRIENDS OR RELATIVES?: ONCE A WEEK
WITHIN THE PAST 12 MONTHS, YOU WORRIED THAT YOUR FOOD WOULD RUN OUT BEFORE YOU GOT THE MONEY TO BUY MORE: NEVER TRUE
IN A TYPICAL WEEK, HOW MANY TIMES DO YOU TALK ON THE PHONE WITH FAMILY, FRIENDS, OR NEIGHBORS?: MORE THAN THREE TIMES A WEEK
DO YOU BELONG TO ANY CLUBS OR ORGANIZATIONS SUCH AS CHURCH GROUPS UNIONS, FRATERNAL OR ATHLETIC GROUPS, OR SCHOOL GROUPS?: NO
IN THE PAST 12 MONTHS, HAS THE ELECTRIC, GAS, OIL, OR WATER COMPANY THREATENED TO SHUT OFF SERVICE IN YOUR HOME?: NO
HOW OFTEN DO YOU HAVE A DRINK CONTAINING ALCOHOL: NEVER
HOW OFTEN DO YOU ATTENT MEETINGS OF THE CLUB OR ORGANIZATION YOU BELONG TO?: NEVER
HOW OFTEN DO YOU GET TOGETHER WITH FRIENDS OR RELATIVES?: ONCE A WEEK
HOW OFTEN DO YOU ATTEND CHURCH OR RELIGIOUS SERVICES?: NEVER
HOW HARD IS IT FOR YOU TO PAY FOR THE VERY BASICS LIKE FOOD, HOUSING, MEDICAL CARE, AND HEATING?: NOT HARD AT ALL
DO YOU BELONG TO ANY CLUBS OR ORGANIZATIONS SUCH AS CHURCH GROUPS UNIONS, FRATERNAL OR ATHLETIC GROUPS, OR SCHOOL GROUPS?: NO
IN A TYPICAL WEEK, HOW MANY TIMES DO YOU TALK ON THE PHONE WITH FAMILY, FRIENDS, OR NEIGHBORS?: MORE THAN THREE TIMES A WEEK
HOW MANY DRINKS CONTAINING ALCOHOL DO YOU HAVE ON A TYPICAL DAY WHEN YOU ARE DRINKING: PATIENT DOES NOT DRINK
HOW OFTEN DO YOU ATTEND CHURCH OR RELIGIOUS SERVICES?: NEVER

## 2025-08-19 ASSESSMENT — LIFESTYLE VARIABLES
HOW OFTEN DO YOU HAVE A DRINK CONTAINING ALCOHOL: NEVER
HOW OFTEN DO YOU HAVE SIX OR MORE DRINKS ON ONE OCCASION: NEVER
SKIP TO QUESTIONS 9-10: 1
AUDIT-C TOTAL SCORE: 0
HOW MANY STANDARD DRINKS CONTAINING ALCOHOL DO YOU HAVE ON A TYPICAL DAY: PATIENT DOES NOT DRINK

## 2025-08-22 ENCOUNTER — OFFICE VISIT (OUTPATIENT)
Dept: MEDICAL GROUP | Facility: MEDICAL CENTER | Age: 80
End: 2025-08-22
Payer: MEDICARE

## 2025-08-22 VITALS
DIASTOLIC BLOOD PRESSURE: 70 MMHG | WEIGHT: 229.28 LBS | HEART RATE: 57 BPM | HEIGHT: 70 IN | SYSTOLIC BLOOD PRESSURE: 130 MMHG | OXYGEN SATURATION: 94 % | TEMPERATURE: 97.9 F | BODY MASS INDEX: 32.82 KG/M2

## 2025-08-22 DIAGNOSIS — G89.29 CHRONIC PAIN OF RIGHT KNEE: ICD-10-CM

## 2025-08-22 DIAGNOSIS — E66.9 OBESITY (BMI 30-39.9): ICD-10-CM

## 2025-08-22 DIAGNOSIS — H61.23 BILATERAL IMPACTED CERUMEN: ICD-10-CM

## 2025-08-22 DIAGNOSIS — I25.10 CORONARY ARTERY DISEASE INVOLVING NATIVE CORONARY ARTERY OF NATIVE HEART WITHOUT ANGINA PECTORIS: ICD-10-CM

## 2025-08-22 DIAGNOSIS — I10 PRIMARY HYPERTENSION: ICD-10-CM

## 2025-08-22 DIAGNOSIS — G89.29 CHRONIC LEFT-SIDED LOW BACK PAIN WITH LEFT-SIDED SCIATICA: ICD-10-CM

## 2025-08-22 DIAGNOSIS — Z00.00 ENCOUNTER FOR SUBSEQUENT ANNUAL WELLNESS VISIT (AWV) IN MEDICARE PATIENT: Primary | ICD-10-CM

## 2025-08-22 DIAGNOSIS — R94.4 DECREASED GFR: ICD-10-CM

## 2025-08-22 DIAGNOSIS — M54.42 CHRONIC LEFT-SIDED LOW BACK PAIN WITH LEFT-SIDED SCIATICA: ICD-10-CM

## 2025-08-22 DIAGNOSIS — G89.29 CHRONIC LEFT HIP PAIN: ICD-10-CM

## 2025-08-22 DIAGNOSIS — M25.561 CHRONIC PAIN OF RIGHT KNEE: ICD-10-CM

## 2025-08-22 DIAGNOSIS — E03.4 HYPOTHYROIDISM DUE TO ACQUIRED ATROPHY OF THYROID: ICD-10-CM

## 2025-08-22 DIAGNOSIS — N40.1 BENIGN PROSTATIC HYPERPLASIA WITH LOWER URINARY TRACT SYMPTOMS, SYMPTOM DETAILS UNSPECIFIED: ICD-10-CM

## 2025-08-22 DIAGNOSIS — E78.5 DYSLIPIDEMIA: ICD-10-CM

## 2025-08-22 DIAGNOSIS — R73.03 PREDIABETES: ICD-10-CM

## 2025-08-22 DIAGNOSIS — G89.29 CHRONIC PAIN OF LEFT LOWER EXTREMITY: ICD-10-CM

## 2025-08-22 DIAGNOSIS — M79.605 CHRONIC PAIN OF LEFT LOWER EXTREMITY: ICD-10-CM

## 2025-08-22 DIAGNOSIS — M25.552 CHRONIC LEFT HIP PAIN: ICD-10-CM

## 2025-08-22 PROCEDURE — 99214 OFFICE O/P EST MOD 30 MIN: CPT | Mod: 25 | Performed by: FAMILY MEDICINE

## 2025-08-22 PROCEDURE — 69210 REMOVE IMPACTED EAR WAX UNI: CPT | Performed by: FAMILY MEDICINE

## 2025-08-22 PROCEDURE — 3078F DIAST BP <80 MM HG: CPT | Performed by: FAMILY MEDICINE

## 2025-08-22 PROCEDURE — G0439 PPPS, SUBSEQ VISIT: HCPCS | Performed by: FAMILY MEDICINE

## 2025-08-22 PROCEDURE — 3075F SYST BP GE 130 - 139MM HG: CPT | Performed by: FAMILY MEDICINE

## 2025-08-22 RX ORDER — LEVOTHYROXINE SODIUM 100 UG/1
100 TABLET ORAL
Qty: 90 TABLET | Refills: 3 | Status: SHIPPED | OUTPATIENT
Start: 2025-08-22

## 2025-08-22 RX ORDER — LOSARTAN POTASSIUM 25 MG/1
25 TABLET ORAL DAILY
Qty: 90 TABLET | Refills: 3 | Status: SHIPPED | OUTPATIENT
Start: 2025-08-22

## 2025-08-22 RX ORDER — PRASUGREL 10 MG/1
10 TABLET, FILM COATED ORAL DAILY
Qty: 90 TABLET | Refills: 3 | Status: SHIPPED | OUTPATIENT
Start: 2025-08-22

## 2025-08-22 RX ORDER — ROSUVASTATIN CALCIUM 20 MG/1
20 TABLET, COATED ORAL EVERY EVENING
Qty: 90 TABLET | Refills: 3 | Status: SHIPPED | OUTPATIENT
Start: 2025-08-22

## 2025-08-22 ASSESSMENT — FIBROSIS 4 INDEX: FIB4 SCORE: 2.1

## 2025-08-22 ASSESSMENT — PATIENT HEALTH QUESTIONNAIRE - PHQ9: CLINICAL INTERPRETATION OF PHQ2 SCORE: 0

## 2025-08-22 ASSESSMENT — ENCOUNTER SYMPTOMS: GENERAL WELL-BEING: GOOD

## 2025-08-22 ASSESSMENT — ACTIVITIES OF DAILY LIVING (ADL): BATHING_REQUIRES_ASSISTANCE: 0

## 2025-08-29 ENCOUNTER — OFFICE VISIT (OUTPATIENT)
Dept: URGENT CARE | Facility: CLINIC | Age: 80
End: 2025-08-29
Payer: MEDICARE

## 2025-08-29 VITALS
TEMPERATURE: 98.4 F | HEIGHT: 70 IN | OXYGEN SATURATION: 97 % | RESPIRATION RATE: 16 BRPM | WEIGHT: 232 LBS | BODY MASS INDEX: 33.21 KG/M2 | SYSTOLIC BLOOD PRESSURE: 136 MMHG | HEART RATE: 60 BPM | DIASTOLIC BLOOD PRESSURE: 80 MMHG
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
## 2025-08-29 ASSESSMENT — FIBROSIS 4 INDEX: FIB4 SCORE: 2.1
